# Patient Record
Sex: FEMALE | Race: WHITE | NOT HISPANIC OR LATINO | Employment: OTHER | URBAN - METROPOLITAN AREA
[De-identification: names, ages, dates, MRNs, and addresses within clinical notes are randomized per-mention and may not be internally consistent; named-entity substitution may affect disease eponyms.]

---

## 2017-02-16 ENCOUNTER — OFFICE VISIT (OUTPATIENT)
Dept: OPTOMETRY | Facility: CLINIC | Age: 72
End: 2017-02-16
Payer: COMMERCIAL

## 2017-02-16 DIAGNOSIS — H02.833 DERMATOCHALASIS OF EYELIDS OF BOTH EYES: ICD-10-CM

## 2017-02-16 DIAGNOSIS — H02.836 DERMATOCHALASIS OF EYELIDS OF BOTH EYES: ICD-10-CM

## 2017-02-16 DIAGNOSIS — H52.4 PRESBYOPIA: ICD-10-CM

## 2017-02-16 DIAGNOSIS — H57.819 BROW PTOSIS: ICD-10-CM

## 2017-02-16 DIAGNOSIS — H52.203 ASTIGMATISM OF BOTH EYES: ICD-10-CM

## 2017-02-16 DIAGNOSIS — H40.053 OCULAR HYPERTENSION, BILATERAL: Primary | ICD-10-CM

## 2017-02-16 PROCEDURE — 92015 DETERMINE REFRACTIVE STATE: CPT | Performed by: OPTOMETRIST

## 2017-02-16 PROCEDURE — 92014 COMPRE OPH EXAM EST PT 1/>: CPT | Performed by: OPTOMETRIST

## 2017-02-16 ASSESSMENT — VISUAL ACUITY
OD_SC: 20/70-1
OS_SC+: +1
CORRECTION_TYPE: GLASSES
OS_SC: 20/50-2
OD_SC+: -1
OS_CC: 20/25
OD_CC: 20/30-2
OS_SC: 20/30
METHOD: SNELLEN - LINEAR
OD_SC: 20/30

## 2017-02-16 ASSESSMENT — REFRACTION_WEARINGRX
OS_ADD: +2.00
OS_CYLINDER: SPHERE
OD_SPHERE: +0.25
OS_SPHERE: PLANO
OD_AXIS: 150
OD_SPHERE: +1.75
OD_ADD: +2.00
OS_SPHERE: +1.75
OD_CYLINDER: +1.00
SPECS_TYPE: OTC'S

## 2017-02-16 ASSESSMENT — PACHYMETRY
OD_CT(UM): 630
OS_CT(UM): 636

## 2017-02-16 ASSESSMENT — CONF VISUAL FIELD
OS_NORMAL: 1
OD_NORMAL: 1

## 2017-02-16 ASSESSMENT — REFRACTION_MANIFEST
OD_SPHERE: -0.25
OS_SPHERE: +0.25
OS_AXIS: 035
OD_AXIS: 108
OS_SPHERE: +0.25
OD_SPHERE: +0.75
OS_ADD: +2.00
OD_ADD: +2.00
OD_CYLINDER: +1.25
OS_CYLINDER: +0.50
OS_CYLINDER: +1.00
METHOD_AUTOREFRACTION: 1
OS_AXIS: 040
OD_AXIS: 140
OD_CYLINDER: +1.25

## 2017-02-16 ASSESSMENT — SLIT LAMP EXAM - LIDS
COMMENTS: 1+ DERMATOCHALASIS: UPPER LID
COMMENTS: 1+ DERMATOCHALASIS: UPPER LID

## 2017-02-16 ASSESSMENT — KERATOMETRY
OD_AXISANGLE2_DEGREES: 176
OD_K1POWER_DIOPTERS: 46.50
OD_K2POWER_DIOPTERS: 48.50
OS_AXISANGLE2_DEGREES: 151
OS_K2POWER_DIOPTERS: 47.00
OS_K1POWER_DIOPTERS: 46.00

## 2017-02-16 ASSESSMENT — TONOMETRY
OS_IOP_MMHG: 20
OD_IOP_MMHG: 21
IOP_METHOD: APPLANATION

## 2017-02-16 ASSESSMENT — CUP TO DISC RATIO
OD_RATIO: 0.5
OS_RATIO: 0.5

## 2017-02-16 ASSESSMENT — EXTERNAL EXAM - LEFT EYE: OS_EXAM: BROW PTOSIS

## 2017-02-16 ASSESSMENT — EXTERNAL EXAM - RIGHT EYE: OD_EXAM: BROW PTOSIS

## 2017-02-16 NOTE — MR AVS SNAPSHOT
"              After Visit Summary   2/16/2017    Mami Dumont    MRN: [id number]           Patient Information     Date Of Birth          1945        Visit Information        Provider Department      2/16/2017 10:30 AM Mireya Lira OD [facility]        Care Instructions    Patient was advised of today's exam findings.  Reading glasses advised  Referral to [facility]      Refer to [facility] [place] for glaucoma vision fields and OCT  Their office will call you to schedule appointment.   Please call [phone] if you have not heard from them within 1 week.    Keyur Preciado and Angel  [facility]  [address] [place] [address] [phone]    Mireya Lira O.D.  [facility]   [address] [place], [place] [id number] [phone]              Follow-ups after your visit        Who to contact     If you have questions or need follow up information about today's clinic visit or your schedule please contact [facility] directly at [phone].  Normal or non-critical lab and imaging results will be communicated to you by MyChart, letter or phone within 4 business days after the clinic has received the results. If you do not hear from us within 7 days, please contact the clinic through ZEFRhart or phone. If you have a critical or abnormal lab result, we will notify you by phone as soon as possible.  Submit refill requests through WineShop or call your pharmacy and they will forward the refill request to us. Please allow 3 business days for your refill to be completed.          Additional Information About Your Visit        MyChart Information     WineShop lets you send messages to your doctor, view your test results, renew your prescriptions, schedule appointments and more. To sign up, go to [email or web address] . Click on \"Log in\" on the left side of the screen, which will " "take you to the Welcome page. Then click on \"Sign up Now\" on the right side of the page.     You will be asked to enter the access code listed below, as well as some personal information. Please follow the directions to create your username and password.     Your access code is: [email or web address]  Expires: 2017 11:57 AM     Your access code will  in 90 days. If you need help or a new code, please call your [place] clinic or [phone].        Care EveryWhere ID     This is your Care EveryWhere ID. This could be used by other organizations to access your [place] medical records  [id number]         Blood Pressure from Last 3 Encounters:   16 186/74   09/21/15 138/78   01/30/15 165/85    Weight from Last 3 Encounters:   16 51.7 kg (114 lb)   09/21/15 51.3 kg (113 lb)   01/30/15 53.5 kg (118 lb)              Today, you had the following     No orders found for display       Primary Care Provider Office Phone #    [facility] [phone]       No address on file        Thank you!     [facility] you [facility]. Our goal is always to provide you with excellent care. Hearing back from our patients is one way we can continue to improve our services. Please take a few minutes to complete the written survey that you may receive in the mail after your visit with us. Thank you!             Your Updated Medication List - Protect others around you: Learn how to safely use, store and throw away your medicines at www.disposemymeds.org.          This list is accurate as of: 17 12:02 PM.  Always use your most recent med list.                   Brand Name Dispense Instructions for use    CENTRUM PO      Take  by mouth. Takes 1 daily       CLARITIN-D 12 HOUR 5-120 MG per 12 hr tablet   Generic drug:  loratadine-pseudoePHEDrine      Take 1 tablet by mouth as needed Taking less than prior due to hypertension.       FLONASE 50 MCG/ACT spray   Generic drug:  " fluticasone     16 g    Spray 2 sprays in nostril daily Brand necessary. Patient gets headache with generic.       hydrochlorothiazide 25 MG tablet    HYDRODIURIL    90 tablet    Take 1 tablet (25 mg) by mouth daily       METHOCARBAMOL PO      As needed       naproxen 500 MG tablet    NAPROSYN     Take 500 mg by mouth 2 times daily (with meals).       propranolol HCl 60 MG Tabs      Take 1 tablet by mouth 20 mg as needed for tremors       SINUS RINSE BOTTLE KIT NA      Spray 1 Bottle in nostril as needed.       STATIN NOT PRESCRIBED (INTENTIONAL)     0 each    1 each continuous prn. Statin not prescribed intentionally due to Refusal by patient       VITAMIN E-200 PO      Take  by mouth. Takes 1 daily

## 2017-02-16 NOTE — PROGRESS NOTES
Chief Complaint   Patient presents with     COMPREHENSIVE EYE EXAM         Last Eye Exam: 2/9/2015  Dilated Previously: Yes    What are you currently using to see?  Readers, uses them to read and at times on the computer, especially if she's working with numbers        Distance Vision Acuity: Satisfied with vision, no changes noticed.     Near Vision Acuity: Satisfied with vision while reading  with readers and Satisfied with vision while using computer with readers and unaided at times  Eye Comfort: good, did mention that she has allergies that sometimes bother her   Do you use eye drops? : Yes: once in awhile for itching when her allergies are bothering her   Occupation or Hobbies: Works for [employer]          Medical, surgical and family histories reviewed and updated 2/16/2017.     Mother - glaucoma   Had appointment with Dr Alicea but did not have surgery- wants another referral    OBJECTIVE: See Ophthalmology exam    ASSESSMENT:    ICD-10-CM    1. 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 Astigmatism of both eyes H52.203 EYE EXAM (SIMPLE-NONBILLABLE)     REFRACTIVE STATUS   3. Presbyopia H52.4 EYE EXAM (SIMPLE-NONBILLABLE)     REFRACTIVE STATUS   4. Brow ptosis, ou L90.8 EYE EXAM (SIMPLE-NONBILLABLE)     REFRACTIVE STATUS     OPHTHALMOLOGY ADULT REFERRAL   5. Dermatochalasis of eyelids of both eyes H02.833 EYE EXAM (SIMPLE-NONBILLABLE)    H02.836 REFRACTIVE STATUS      PLAN:     Patient Instructions   Patient was advised of today's exam findings.  Reading glasses advised  Referral to [facility]      Refer to [facility] [place] for glaucoma vision fields and OCT  Their office will call you to schedule appointment.   Please call [phone] if you have not heard from them within 1 week.    Keyur Preciado and Angel  [facility]  [address]  [place] [address]   [phone]    Mireya Lira O.D.  [address] [id number] [phone]

## 2017-02-16 NOTE — PATIENT INSTRUCTIONS
Patient was advised of today's exam findings.  Reading glasses advised  Referral to [facility]      Refer to [facility] at [place] for glaucoma vision fields and OCT  Their office will call you to schedule appointment.   Please call [phone] if you have not heard from them within 1 week.    Keyur Preciado and Angel  [facility] [place] [facility]  [address] [place] [id number] [phone]    Mireya Lira O.D.  [place] [address] [place], [place] [id number] [phone]

## 2017-03-14 ENCOUNTER — OFFICE VISIT (OUTPATIENT)
Dept: OPHTHALMOLOGY | Facility: CLINIC | Age: 72
End: 2017-03-14
Payer: COMMERCIAL

## 2017-03-14 DIAGNOSIS — H40.003 GLAUCOMA SUSPECT OF BOTH EYES: Primary | ICD-10-CM

## 2017-03-14 PROCEDURE — 92133 CPTRZD OPH DX IMG PST SGM ON: CPT | Performed by: OPHTHALMOLOGY

## 2017-03-14 PROCEDURE — 92002 INTRM OPH EXAM NEW PATIENT: CPT | Performed by: OPHTHALMOLOGY

## 2017-03-14 PROCEDURE — 92083 EXTENDED VISUAL FIELD XM: CPT | Performed by: OPHTHALMOLOGY

## 2017-03-14 ASSESSMENT — EXTERNAL EXAM - RIGHT EYE: OD_EXAM: BROW PTOSIS

## 2017-03-14 ASSESSMENT — PACHYMETRY
OS_CT(UM): 636
OD_CT(UM): 630

## 2017-03-14 ASSESSMENT — TONOMETRY
OS_IOP_MMHG: 18
OD_IOP_MMHG: 20
IOP_METHOD: APPLANATION

## 2017-03-14 ASSESSMENT — VISUAL ACUITY
METHOD: SNELLEN - LINEAR
OS_SC+: +2
CORRECTION_TYPE: GLASSES
OS_SC: 20/30
OD_SC: 20/25

## 2017-03-14 ASSESSMENT — EXTERNAL EXAM - LEFT EYE: OS_EXAM: BROW PTOSIS

## 2017-03-14 ASSESSMENT — SLIT LAMP EXAM - LIDS
COMMENTS: 1+ DERMATOCHALASIS: UPPER LID
COMMENTS: 1+ DERMATOCHALASIS: UPPER LID

## 2017-03-14 NOTE — PROGRESS NOTES
Current Eye Medications:  None     Subjective:  Glaucoma Evaluation per Pankaj.  No visual changes noted.     Objective:  See Ophthalmology Exam.       Assessment:  Baseline glaucoma OCT and Egan Visual Field in patient who is a glaucoma suspect.  Thick corneas on pachymetry.      Plan: See Dr. Alicea as planned  Return visit one year for a complete exam with Dr. Lira  Call in November 2017 for an appointment in March 2018 for Pressure check, Visual Field, glaucoma OCT     Dr. Preciado [phone]

## 2017-03-14 NOTE — PATIENT INSTRUCTIONS
See Dr. Alicea as planned  Return visit one year for a complete exam with Dr. Lira  Call in November 2017 for an appointment in March 2018 for Pressure check, Visual Field, glaucoma OCT     Dr. Preciado [phone]

## 2017-03-14 NOTE — MR AVS SNAPSHOT
"              After Visit Summary   3/14/2017    Mami Dumont    MRN: [id number]           Patient Information     Date Of Birth          1945        Visit Information        Provider Department      3/14/2017 12:45 PM Alvin Preciado MD [facility]        Today's Diagnoses     Glaucoma suspect of both eyes    -  1      Care Instructions    See Dr. Alicea as planned  Return visit one year for a complete exam with Dr. Lira  Call in November 2017 for an appointment in March 2018 for Pressure check, Visual Field, glaucoma OCT     Dr. Preciado [phone]        Follow-ups after your visit        Who to contact     If you have questions or need follow up information about today's clinic visit or your schedule please contact [facility] directly at [phone].  Normal or non-critical lab and imaging results will be communicated to you by MyChart, letter or phone within 4 business days after the clinic has received the results. If you do not hear from us within 7 days, please contact the clinic through MyChart or phone. If you have a critical or abnormal lab result, we will notify you by phone as soon as possible.  Submit refill requests through Fanear or call your pharmacy and they will forward the refill request to us. Please allow 3 business days for your refill to be completed.          Additional Information About Your Visit        MyChart Information     Fanear lets you send messages to your doctor, view your test results, renew your prescriptions, schedule appointments and more. To sign up, go to [email or web address] . Click on \"Log in\" on the left side of the screen, which will take you to the Welcome page. Then click on \"Sign up Now\" on the right side of the page.     You will be asked to enter the access code listed below, as well as some personal information. Please follow the directions to create your username and password.     Your access code is: " [email or web address]  Expires: 2017 12:57 PM     Your access code will  in 90 days. If you need help or a new code, please call your [place] clinic or [phone].        Care EveryWhere ID     This is your Care EveryWhere ID. This could be used by other organizations to access your [place] medical records  [id number]         Blood Pressure from Last 3 Encounters:   16 186/74   09/21/15 138/78   01/30/15 165/85    Weight from Last 3 Encounters:   16 51.7 kg (114 lb)   09/21/15 51.3 kg (113 lb)   01/30/15 53.5 kg (118 lb)              We Performed the Following     HC COMPUTERIZED OPHTHALMIC IMAGING OPTIC NERVE     VISUAL FIELDS EXAM (EXTENDED)        Primary Care Provider Office Phone #    [facility] [phone]       No address on file        Thank you!     Thank you for choosing [facility]  for your care. Our goal is always to provide you with excellent care. Hearing back from our patients is one way we can continue to improve our services. Please take a few minutes to complete the written survey that you may receive in the mail after your visit with us. Thank you!             Your Updated Medication List - Protect others around you: Learn how to safely use, store and throw away your medicines at www.disposemymeds.org.          This list is accurate as of: 3/14/17  1:46 PM.  Always use your most recent med list.                   Brand Name Dispense Instructions for use    CENTRUM PO      Take  by mouth. Takes 1 daily       CLARITIN-D 12 HOUR 5-120 MG per 12 hr tablet   Generic drug:  loratadine-pseudoePHEDrine      Take 1 tablet by mouth as needed Taking less than prior due to hypertension.       FLONASE 50 MCG/ACT spray   Generic drug:  fluticasone     16 g    Spray 2 sprays in nostril daily Brand necessary. Patient gets headache with generic.       hydrochlorothiazide 25 MG tablet    HYDRODIURIL    90 tablet    Take 1 tablet (25 mg) by mouth daily        METHOCARBAMOL PO      As needed       naproxen 500 MG tablet    NAPROSYN     Take 500 mg by mouth 2 times daily (with meals).       propranolol HCl 60 MG Tabs      Take 1 tablet by mouth 20 mg as needed for tremors       SINUS RINSE BOTTLE KIT NA      Spray 1 Bottle in nostril as needed.       STATIN NOT PRESCRIBED (INTENTIONAL)     0 each    1 each continuous prn. Statin not prescribed intentionally due to Refusal by patient       VITAMIN E-200 PO      Take  by mouth. Takes 1 daily

## 2017-05-05 ENCOUNTER — OFFICE VISIT (OUTPATIENT)
Dept: INTERNAL MEDICINE | Facility: CLINIC | Age: 72
End: 2017-05-05
Payer: COMMERCIAL

## 2017-05-05 VITALS
HEIGHT: 60 IN | WEIGHT: 115 LBS | SYSTOLIC BLOOD PRESSURE: 122 MMHG | BODY MASS INDEX: 22.58 KG/M2 | TEMPERATURE: 98.4 F | DIASTOLIC BLOOD PRESSURE: 73 MMHG | OXYGEN SATURATION: 100 % | HEART RATE: 71 BPM

## 2017-05-05 DIAGNOSIS — Z11.59 NEED FOR HEPATITIS C SCREENING TEST: ICD-10-CM

## 2017-05-05 DIAGNOSIS — H57.819 BROW PTOSIS: ICD-10-CM

## 2017-05-05 DIAGNOSIS — Z12.31 VISIT FOR SCREENING MAMMOGRAM: ICD-10-CM

## 2017-05-05 DIAGNOSIS — Z23 NEED FOR PROPHYLACTIC VACCINATION AGAINST STREPTOCOCCUS PNEUMONIAE (PNEUMOCOCCUS): ICD-10-CM

## 2017-05-05 DIAGNOSIS — I10 HYPERTENSION GOAL BP (BLOOD PRESSURE) < 140/90: ICD-10-CM

## 2017-05-05 DIAGNOSIS — Z01.818 PREOP GENERAL PHYSICAL EXAM: Primary | ICD-10-CM

## 2017-05-05 LAB
ANION GAP SERPL CALCULATED.3IONS-SCNC: 10 MMOL/L (ref 3–14)
BUN SERPL-MCNC: 16 MG/DL (ref 7–30)
CALCIUM SERPL-MCNC: 8.9 MG/DL (ref 8.5–10.1)
CHLORIDE SERPL-SCNC: 99 MMOL/L (ref 94–109)
CO2 SERPL-SCNC: 27 MMOL/L (ref 20–32)
CREAT SERPL-MCNC: 0.87 MG/DL (ref 0.52–1.04)
GFR SERPL CREATININE-BSD FRML MDRD: 64 ML/MIN/1.7M2
GLUCOSE SERPL-MCNC: 77 MG/DL (ref 70–99)
HGB BLD-MCNC: 13.5 G/DL (ref 11.7–15.7)
POTASSIUM SERPL-SCNC: 3.4 MMOL/L (ref 3.4–5.3)
SODIUM SERPL-SCNC: 136 MMOL/L (ref 133–144)

## 2017-05-05 PROCEDURE — 86803 HEPATITIS C AB TEST: CPT | Performed by: INTERNAL MEDICINE

## 2017-05-05 PROCEDURE — 80048 BASIC METABOLIC PNL TOTAL CA: CPT | Performed by: INTERNAL MEDICINE

## 2017-05-05 PROCEDURE — 93000 ELECTROCARDIOGRAM COMPLETE: CPT | Performed by: INTERNAL MEDICINE

## 2017-05-05 PROCEDURE — 85018 HEMOGLOBIN: CPT | Performed by: INTERNAL MEDICINE

## 2017-05-05 PROCEDURE — 99215 OFFICE O/P EST HI 40 MIN: CPT | Mod: 25 | Performed by: INTERNAL MEDICINE

## 2017-05-05 PROCEDURE — 36415 COLL VENOUS BLD VENIPUNCTURE: CPT | Performed by: INTERNAL MEDICINE

## 2017-05-05 NOTE — PATIENT INSTRUCTIONS
Do not take Naproxen starting 2 days prior to surgery.   Stop vitamin E two weeks before surgery and you may restart it two weeks after surgery.  Please do not take hydrochlorothiazide on the day of the procedure or surgery. You may restart it/them the next day as soon as you are eating and drinking well, and  as long as your surgeon approves you restarting this medication(s).  Please stop all other vitamins and supplements on the day of the procedure or surgery and you may restart them them when you are discharged (unless advised otherwise at the time of the discharge).  Please call our clinic if you have any questions.    Before Your Surgery      Call your surgeon if there is any change in your health. This includes signs of a cold or flu (such as a sore throat, runny nose, cough, rash or fever).    Do not smoke, drink alcohol or take over the counter medicine (unless your surgeon or primary care doctor tells you to) for the 24 hours before and after surgery.    If you take prescribed drugs: Follow your doctor s orders about which medicines to take and which to stop until after surgery.    Eating and drinking prior to surgery: follow the instructions from your surgeon    Take a shower or bath the night before surgery. Use the soap your surgeon gave you to gently clean your skin. If you do not have soap from your surgeon, use your regular soap. Do not shave or scrub the surgery site.  Wear clean pajamas and have clean sheets on your bed.

## 2017-05-05 NOTE — MR AVS SNAPSHOT
After Visit Summary   5/5/2017    Mami Dumont    MRN: [id number]           Patient Information     Date Of Birth          1945        Visit Information        Provider Department      5/5/2017 10:30 AM Polina Curiel MD [facility]        Today's Diagnoses     Preop general physical exam    -  1    Visit for screening mammogram        Need for hepatitis C screening test        Need for prophylactic vaccination against Streptococcus pneumoniae (pneumococcus)          Care Instructions    Do not take Naproxen starting 2 days prior to surgery.   Stop vitamin E two weeks before surgery and you may restart it two weeks after surgery.  Please do not take hydrochlorothiazide on the day of the procedure or surgery. You may restart it/them the next day as soon as you are eating and drinking well, and  as long as your surgeon approves you restarting this medication(s).  Please stop all other vitamins and supplements on the day of the procedure or surgery and you may restart them them when you are discharged (unless advised otherwise at the time of the discharge).  Please call our clinic if you have any questions.    Before Your Surgery      Call your surgeon if there is any change in your health. This includes signs of a cold or flu (such as a sore throat, runny nose, cough, rash or fever).    Do not smoke, drink alcohol or take over the counter medicine (unless your surgeon or primary care doctor tells you to) for the 24 hours before and after surgery.    If you take prescribed drugs: Follow your doctor s orders about which medicines to take and which to stop until after surgery.    Eating and drinking prior to surgery: follow the instructions from your surgeon    Take a shower or bath the night before surgery. Use the soap your surgeon gave you to gently clean your skin. If you do not have soap from your surgeon, use your regular soap. Do not shave or scrub the surgery  "site.  Wear clean pajamas and have clean sheets on your bed.         Follow-ups after your visit        Future tests that were ordered for you today     Open Future Orders        Priority Expected Expires Ordered    MA SCREENING DIGITAL BILAT - Future  (s+30) Routine  2018            Who to contact     If you have questions or need follow up information about today's clinic visit or your schedule please contact [facility] directly at [phone].  Normal or non-critical lab and imaging results will be communicated to you by Zooskhart, letter or phone within 4 business days after the clinic has received the results. If you do not hear from us within 7 days, please contact the clinic through Zooskhart or phone. If you have a critical or abnormal lab result, we will notify you by phone as soon as possible.  Submit refill requests through [employer] or call your pharmacy and they will forward the refill request to us. Please allow 3 business days for your refill to be completed.          Additional Information About Your Visit        EGG Energy Information     [employer] lets you send messages to your doctor, view your test results, renew your prescriptions, schedule appointments and more. To sign up, go to [email or web address] . Click on \"Log in\" on the left side of the screen, which will take you to the Welcome page. Then click on \"Sign up Now\" on the right side of the page.     You will be asked to enter the access code listed below, as well as some personal information. Please follow the directions to create your username and password.     Your access code is: [email or web address]  Expires: 2017 12:57 PM     Your access code will  in 90 days. If you need help or a new code, please call your [facility] or [phone].        Care EveryWhere ID     This is your Care EveryWhere ID. This could be used by other organizations to access your [place] medical records  [id number]        Your " Vitals Were     Pulse Temperature Height Pulse Oximetry BMI (Body Mass Index)       71 98.4  F (36.9  C) (Oral) 5' (1.524 m) 100% 22.46 kg/m2        Blood Pressure from Last 3 Encounters:   05/05/17 122/73   07/03/16 186/74   09/21/15 138/78    Weight from Last 3 Encounters:   05/05/17 115 lb (52.2 kg)   07/03/16 114 lb (51.7 kg)   09/21/15 113 lb (51.3 kg)              We Performed the Following     Basic metabolic panel  (Ca, Cl, CO2, Creat, Gluc, K, Na, BUN)     EKG 12-lead complete w/read - Clinics     Hemoglobin     Hepatitis C Screen Reflex to HCV RNA Quant and Genotype        Primary Care Provider Office Phone #    [facility] Cort[facility] [phone]       No address on file        Thank you!     Thank you for [facility]  [facility]. Our goal is always to provide you with excellent care. Hearing back from our patients is one way we can continue to improve our services. Please take a few minutes to complete the written survey that you may receive in the mail after your visit with us. Thank you!             Your Updated Medication List - Protect others around you: Learn how to safely use, store and throw away your medicines at www.disposemymeds.org.          This list is accurate as of: 5/5/17 11:42 AM.  Always use your most recent med list.                   Brand Name Dispense Instructions for use    CENTRUM PO      Take  by mouth. Takes 1 daily       CLARITIN-D 12 HOUR 5-120 MG per 12 hr tablet   Generic drug:  loratadine-pseudoePHEDrine      Take 1 tablet by mouth as needed Taking less than prior due to hypertension.       FLONASE 50 MCG/ACT spray   Generic drug:  fluticasone     16 g    Spray 2 sprays in nostril daily Brand necessary. Patient gets headache with generic.       hydrochlorothiazide 25 MG tablet    HYDRODIURIL    90 tablet    Take 1 tablet (25 mg) by mouth daily       METHOCARBAMOL PO      As needed       naproxen 500 MG tablet    NAPROSYN     Take 500 mg by mouth 2  times daily (with meals).       propranolol HCl 60 MG Tabs      Take 1 tablet by mouth 20 mg as needed for tremors       SINUS RINSE BOTTLE KIT NA      Spray 1 Bottle in nostril as needed.       STATIN NOT PRESCRIBED (INTENTIONAL)     0 each    1 each continuous prn. Statin not prescribed intentionally due to Refusal by patient       VITAMIN E-200 PO      Take  by mouth. Takes 1 daily

## 2017-05-05 NOTE — PROGRESS NOTES
[facility]  [address] [facility] [address]  [phone]  Dept: [phone]    PRE-OP EVALUATION:  Today's date: 2017    Mami Dumont (: 1945) presents for pre-operative evaluation assessment as requested by Dr. Alicea.  She requires evaluation and anesthesia risk assessment prior to undergoing surgery/procedure for treatment of drooping brows.  Proposed procedure: browlift (for better vision).    Date of Surgery/ Procedure: 2017  Time of Surgery/ Procedure: 9am  Hospital/Surgical Facility: [facility]   Fax number for surgical facility: [phone]  Primary Physician: Yoel Roth  Type of Anesthesia Anticipated: General    Patient has a Health Care Directive or Living Will:  NO    1. NO - Do you have a history of heart attack, stroke, stent, bypass or surgery on an artery in the head, neck, heart or legs?  2. NO - Do you ever have any pain or discomfort in your chest?  3. NO - Do you have a history of  Heart Failure?  4. NO - Are you troubled by shortness of breath when: walking on the level, up a slight hill or at night?  5. NO - Do you currently have a cold, bronchitis or other respiratory infection?  6. NO - Do you have a cough, shortness of breath or wheezing?  7. NO - Do you sometimes get pains in the calves of your legs when you walk?  8. NO - Do you or anyone in your family have previous history of blood clots?  9. NO - Do you or does anyone in your family have a serious bleeding problem such as prolonged bleeding following surgeries or cuts?  10. NO - Have you ever had problems with anemia or been told to take iron pills?  11. NO - Have you had any abnormal blood loss such as black, tarry or bloody stools, or abnormal vaginal bleeding?  12. NO - Have you ever had a blood transfusion?  13. NO - Have you or any of your relatives ever had problems with anesthesia?  14. NO - Do you have sleep apnea, excessive snoring or  daytime drowsiness?  15. NO - Do you have any prosthetic heart valves?  16. NO - Do you have prosthetic joints?  17. NO - Is there any chance that you may be pregnant?      HPI:                                                      Brief HPI related to upcoming procedure: see above    Hypertension:  ASSESSMENT: Longstanding, well-controlled, on hydrochlorothiazide and other medications as noted.  PLAN: see Patient Instructions below.   See problem list for active medical problems.  Problems all longstanding and stable, except as noted/documented.  See ROS for pertinent symptoms related to these conditions.                                                                                                  .    MEDICAL HISTORY:                                                      Patient Active Problem List    Diagnosis Date Noted     Glaucoma suspect of both eyes 03/14/2017     Priority: Medium     Essential tremor 01/23/2014     Priority: Medium     Hypertension goal BP (blood pressure) < 140/90 09/10/2013     Priority: Medium     Tubular adenoma of colon      Priority: Medium     Dermatochalasis, ou 05/23/2013     Priority: Medium     Brow ptosis, ou 05/23/2013     Priority: Medium     Hyperlipidemia LDL goal <160 04/24/2013     Priority: Medium     Heme positive stool 04/24/2013     Priority: Medium     CARDIOVASCULAR SCREENING; LDL GOAL LESS THAN 160 04/05/2013     Priority: Medium     Advanced directives, counseling/discussion 04/03/2013     Priority: Medium     Advance Care Planning:   Form given Venecia Lu MA               Diagnostic skin and sensitization tests      Priority: Medium     1/99 skin tests: pos. for cat/DM/M/RW per Dr. Shah.        Allergic rhinitis due to animal dander      Priority: Medium     Seasonal allergic rhinitis      Priority: Medium     House dust mite allergy      Priority: Medium     Rhinitis, allergic to other allergen      Priority: Medium     IMO update changed this record. Please  review for accuracy       Seasonal allergic conjunctivitis      Priority: Medium      Past Medical History:   Diagnosis Date     Allergic rhinitis due to animal dander 1/99 skin tests: pos. for cat/DM/M/RW per Dr. Shah.           Diagnostic skin and sensitization tests     1/99 skin tests: pos. for cat/DM/M/RW per Dr. Shah.      House dust mite allergy      Hypertension goal BP (blood pressure) < 140/90 9/10/2013     Open angle with borderline findings, low risk 8/29/2013     Rhinitis, allergic to other allergen      Seasonal allergic conjunctivitis      Seasonal allergic rhinitis     hx of elevated BP on Sudafed, Pt told to discontinue Sudafed products by Dr. Taylor on 3/1/10 visit.      Tubular adenoma of colon 5/2013    colonoscopy due 5/2018     Past Surgical History:   Procedure Laterality Date     BLEPHAROPLASTY, BROW LIFT BILATERAL, COMBINED  5/2/13    with snip punctoplasty,Sadowsky     ENDOSCOPIC POLYPECTOMY NASAL  1960    nasal polyp removal     Current Outpatient Prescriptions   Medication Sig Dispense Refill     hydrochlorothiazide (HYDRODIURIL) 25 MG tablet Take 1 tablet (25 mg) by mouth daily 90 tablet 3     FLONASE 50 MCG/ACT nasal spray Spray 2 sprays in nostril daily Brand necessary. Patient gets headache with generic. 16 g 3     propranolol HCl 60 MG TABS Take 1 tablet by mouth 20 mg as needed for tremors       [DISCONTINUED] amLODIPine (NORVASC) 5 MG tablet Take 1 tablet (5 mg) by mouth daily 30 tablet 1     loratadine-pseudoePHEDrine (CLARITIN-D 12 HOUR) 5-120 MG per tablet Take 1 tablet by mouth as needed Taking less than prior due to hypertension.       METHOCARBAMOL PO As needed       STATIN NOT PRESCRIBED, INTENTIONAL, 1 each continuous prn. Statin not prescribed intentionally due to Refusal by patient 0 each 0     Multiple Vitamins-Minerals (CENTRUM PO) Take  by mouth. Takes 1 daily       VITAMIN E-200 PO Take  by mouth. Takes 1 daily       naproxen (NAPROSYN) 500 MG tablet Take 500 mg by  mouth 2 times daily (with meals).       Hypertonic Nasal Wash (SINUS RINSE BOTTLE KIT NA) Spray 1 Bottle in nostril as needed.       OTC products: None, except as noted above    Allergies   Allergen Reactions     Ace Inhibitors      Head tremor     Penicillins       Latex Allergy: NO    Social History   Substance Use Topics     Smoking status: Never Smoker     Smokeless tobacco: Never Used      Comment: Lives in smoke free household     Alcohol use No     History   Drug Use No       REVIEW OF SYSTEMS:                                                    Constitutional, neuro, ENT, endocrine, pulmonary, cardiac, gastrointestinal, genitourinary, musculoskeletal, integument and psychiatric systems are negative, except as otherwise noted.    EXAM:                                                    /73  Pulse 71  Temp 98.4  F (36.9  C) (Oral)  Ht 5' (1.524 m)  Wt 115 lb (52.2 kg)  SpO2 100%  BMI 22.46 kg/m2      GENERAL APPEARANCE: healthy, alert and no distress     EYES: EOMI,- PERRL     HENT: ear canals and TM's normal and nose and mouth without ulcers or lesions     NECK: no adenopathy, no asymmetry, masses, or scars and thyroid normal to palpation     RESP: lungs clear to auscultation - no rales, rhonchi or wheezes     CV: regular rates and rhythm, normal S1 S2, no S3 or S4 and no murmur, click or rub -     ABDOMEN:  soft, nontender, no HSM or masses and bowel sounds normal     MS: extremities normal- no gross deformities noted, no evidence of inflammation in joints, FROM in all extremities.     SKIN: no suspicious lesions or rashes     NEURO: Normal strength and tone, sensory exam grossly normal, mentation intact and speech normal     PSYCH: mentation appears normal. and affect normal/bright     LYMPHATICS: No cervical or supraclavicular nodes      DIAGNOSTICS:                                                    EKG: appears normal, NSR, normal axis, normal intervals, no acute ST/T changes c/w ischemia, no  LVH by voltage criteria    Results for orders placed or performed in visit on 05/05/17   Hemoglobin   Result Value Ref Range    Hemoglobin 13.5 11.7 - 15.7 g/dL   Basic metabolic panel  (Ca, Cl, CO2, Creat, Gluc, K, Na, BUN)   Result Value Ref Range    Sodium 136 133 - 144 mmol/L    Potassium 3.4 3.4 - 5.3 mmol/L    Chloride 99 94 - 109 mmol/L    Carbon Dioxide 27 20 - 32 mmol/L    Anion Gap 10 3 - 14 mmol/L    Glucose 77 70 - 99 mg/dL    Urea Nitrogen 16 7 - 30 mg/dL    Creatinine 0.87 0.52 - 1.04 mg/dL    GFR Estimate 64 >60 mL/min/1.7m2    GFR Estimate If Black 77 >60 mL/min/1.7m2    Calcium 8.9 8.5 - 10.1 mg/dL        Recent Labs   Lab Test  10/07/15   0900  11/21/14   1154  11/21/13   1312   HGB   --    --   13.1   PLT   --    --   306   NA  136  138  137   POTASSIUM  3.7  3.7  4.8   CR  0.92  0.87  0.80        IMPRESSION:                                                    Reason for surgery/procedure: see above  Diagnosis/reason for consult: see above    The proposed surgical procedure is considered INTERMEDIATE risk.    REVISED CARDIAC RISK INDEX  The patient has the following serious cardiovascular risks for perioperative complications such as (MI, PE, VFib and 3  AV Block):  No serious cardiac risks  INTERPRETATION: 1 risks: Class II (low risk - 0.9% complication rate): age    The patient has the following additional risks for perioperative complications:  No identified additional risks      ICD-10-CM    1. Preop general physical exam Z01.818 EKG 12-lead complete w/read - Clinics     Hemoglobin     Basic metabolic panel  (Ca, Cl, CO2, Creat, Gluc, K, Na, BUN)   2. Brow ptosis, ou L90.8    3. Hypertension goal BP (blood pressure) < 140/90 I10    4. Visit for screening mammogram Z12.31 MA SCREENING DIGITAL BILAT - Future  (s+30)   5. Need for hepatitis C screening test Z11.59 Hepatitis C Screen Reflex to HCV RNA Quant and Genotype   6. Need for prophylactic vaccination against Streptococcus pneumoniae  (pneumococcus) Z23        RECOMMENDATIONS:                                                        Patient Instructions:  Do not take Naproxen starting 2 days prior to surgery.   Stop vitamin E two weeks before surgery and you may restart it two weeks after surgery.  Please do not take hydrochlorothiazide on the day of the procedure or surgery. You may restart it/them the next day as soon as you are eating and drinking well, and  as long as your surgeon approves you restarting this medication(s).  Please stop all other vitamins and supplements on the day of the procedure or surgery and you may restart them them when you are discharged (unless advised otherwise at the time of the discharge).  Please call our clinic if you have any questions.        Signed Electronically by: Polina Curiel MD    Copy of this evaluation report is provided to requesting physician.    [facility] Preop Guidelines

## 2017-05-05 NOTE — LETTER
[facility]  [address] [facility] [address] [phone]        May 10, 2017    Mami Dumont  [address] [facility] [address]            Dear Mami,    Your kidney function and blood electrolytes are within normal limits.   Your hemoglobin is within normal limits - you do not have anemia currently.  You do not have Hepatitis C.  The test for Hepatitis C test was done, as you will recall, because we are screening people born within a certain time period for this infection, according to new medical guidelines.    Please call our clinic at [phone] if you have any questions.      Polina Curiel MD    Results for orders placed or performed in visit on 05/05/17   Hepatitis C Screen Reflex to HCV RNA Quant and Genotype   Result Value Ref Range    Hepatitis C Antibody  NR     Nonreactive   Assay performance characteristics have not been established for newborns,   infants, and children     Hemoglobin   Result Value Ref Range    Hemoglobin 13.5 11.7 - 15.7 g/dL   Basic metabolic panel  (Ca, Cl, CO2, Creat, Gluc, K, Na, BUN)   Result Value Ref Range    Sodium 136 133 - 144 mmol/L    Potassium 3.4 3.4 - 5.3 mmol/L    Chloride 99 94 - 109 mmol/L    Carbon Dioxide 27 20 - 32 mmol/L    Anion Gap 10 3 - 14 mmol/L    Glucose 77 70 - 99 mg/dL    Urea Nitrogen 16 7 - 30 mg/dL    Creatinine 0.87 0.52 - 1.04 mg/dL    GFR Estimate 64 >60 mL/min/1.7m2    GFR Estimate If Black 77 >60 mL/min/1.7m2    Calcium 8.9 8.5 - 10.1 mg/dL

## 2017-05-08 LAB — HCV AB SERPL QL IA: NORMAL

## 2017-05-10 NOTE — PROGRESS NOTES
Please send letter informing the patient and attach results:    Dear Mami,     Your kidney function and blood electrolytes are within normal limits.   Your hemoglobin is within normal limits - you do not have anemia currently.  You do not have Hepatitis C.  The test for Hepatitis C test was done, as you will recall, because we are screening people born within a certain time period for this infection, according to new medical guidelines.    Please call our clinic at [phone] if you have any questions.      Polina Curiel MD

## 2017-05-16 NOTE — H&P (VIEW-ONLY)
[facility]  [address] [facility] [address]  [phone]  Dept: [phone]    PRE-OP EVALUATION:  Today's date: 2017    Mami Dumont (: 1945) presents for pre-operative evaluation assessment as requested by Dr. Alicea.  She requires evaluation and anesthesia risk assessment prior to undergoing surgery/procedure for treatment of drooping brows.  Proposed procedure: browlift (for better vision).    Date of Surgery/ Procedure: 2017  Time of Surgery/ Procedure: 9am  Hospital/Surgical Facility: [facility]   Fax number for surgical facility: [phone]  Primary Physician: Yoel Roth  Type of Anesthesia Anticipated: General    Patient has a Health Care Directive or Living Will:  NO    1. NO - Do you have a history of heart attack, stroke, stent, bypass or surgery on an artery in the head, neck, heart or legs?  2. NO - Do you ever have any pain or discomfort in your chest?  3. NO - Do you have a history of  Heart Failure?  4. NO - Are you troubled by shortness of breath when: walking on the level, up a slight hill or at night?  5. NO - Do you currently have a cold, bronchitis or other respiratory infection?  6. NO - Do you have a cough, shortness of breath or wheezing?  7. NO - Do you sometimes get pains in the calves of your legs when you walk?  8. NO - Do you or anyone in your family have previous history of blood clots?  9. NO - Do you or does anyone in your family have a serious bleeding problem such as prolonged bleeding following surgeries or cuts?  10. NO - Have you ever had problems with anemia or been told to take iron pills?  11. NO - Have you had any abnormal blood loss such as black, tarry or bloody stools, or abnormal vaginal bleeding?  12. NO - Have you ever had a blood transfusion?  13. NO - Have you or any of your relatives ever had problems with anesthesia?  14. NO - Do you have sleep apnea, excessive snoring or  daytime drowsiness?  15. NO - Do you have any prosthetic heart valves?  16. NO - Do you have prosthetic joints?  17. NO - Is there any chance that you may be pregnant?      HPI:                                                      Brief HPI related to upcoming procedure: see above    Hypertension:  ASSESSMENT: Longstanding, well-controlled, on hydrochlorothiazide and other medications as noted.  PLAN: see Patient Instructions below.   See problem list for active medical problems.  Problems all longstanding and stable, except as noted/documented.  See ROS for pertinent symptoms related to these conditions.                                                                                                  .    MEDICAL HISTORY:                                                      Patient Active Problem List    Diagnosis Date Noted     Glaucoma suspect of both eyes 03/14/2017     Priority: Medium     Essential tremor 01/23/2014     Priority: Medium     Hypertension goal BP (blood pressure) < 140/90 09/10/2013     Priority: Medium     Tubular adenoma of colon      Priority: Medium     Dermatochalasis, ou 05/23/2013     Priority: Medium     Brow ptosis, ou 05/23/2013     Priority: Medium     Hyperlipidemia LDL goal <160 04/24/2013     Priority: Medium     Heme positive stool 04/24/2013     Priority: Medium     CARDIOVASCULAR SCREENING; LDL GOAL LESS THAN 160 04/05/2013     Priority: Medium     Advanced directives, counseling/discussion 04/03/2013     Priority: Medium     Advance Care Planning:   Form given Venecia Lu MA               Diagnostic skin and sensitization tests      Priority: Medium     1/99 skin tests: pos. for cat/DM/M/RW per Dr. Shah.        Allergic rhinitis due to animal dander      Priority: Medium     Seasonal allergic rhinitis      Priority: Medium     House dust mite allergy      Priority: Medium     Rhinitis, allergic to other allergen      Priority: Medium     IMO update changed this record. Please  review for accuracy       Seasonal allergic conjunctivitis      Priority: Medium      Past Medical History:   Diagnosis Date     Allergic rhinitis due to animal dander 1/99 skin tests: pos. for cat/DM/M/RW per Dr. Shah.           Diagnostic skin and sensitization tests     1/99 skin tests: pos. for cat/DM/M/RW per Dr. Shah.      House dust mite allergy      Hypertension goal BP (blood pressure) < 140/90 9/10/2013     Open angle with borderline findings, low risk 8/29/2013     Rhinitis, allergic to other allergen      Seasonal allergic conjunctivitis      Seasonal allergic rhinitis     hx of elevated BP on Sudafed, Pt told to discontinue Sudafed products by Dr. Taylor on 3/1/10 visit.      Tubular adenoma of colon 5/2013    colonoscopy due 5/2018     Past Surgical History:   Procedure Laterality Date     BLEPHAROPLASTY, BROW LIFT BILATERAL, COMBINED  5/2/13    with snip punctoplasty,Sadowsky     ENDOSCOPIC POLYPECTOMY NASAL  1960    nasal polyp removal     Current Outpatient Prescriptions   Medication Sig Dispense Refill     hydrochlorothiazide (HYDRODIURIL) 25 MG tablet Take 1 tablet (25 mg) by mouth daily 90 tablet 3     FLONASE 50 MCG/ACT nasal spray Spray 2 sprays in nostril daily Brand necessary. Patient gets headache with generic. 16 g 3     propranolol HCl 60 MG TABS Take 1 tablet by mouth 20 mg as needed for tremors       [DISCONTINUED] amLODIPine (NORVASC) 5 MG tablet Take 1 tablet (5 mg) by mouth daily 30 tablet 1     loratadine-pseudoePHEDrine (CLARITIN-D 12 HOUR) 5-120 MG per tablet Take 1 tablet by mouth as needed Taking less than prior due to hypertension.       METHOCARBAMOL PO As needed       STATIN NOT PRESCRIBED, INTENTIONAL, 1 each continuous prn. Statin not prescribed intentionally due to Refusal by patient 0 each 0     Multiple Vitamins-Minerals (CENTRUM PO) Take  by mouth. Takes 1 daily       VITAMIN E-200 PO Take  by mouth. Takes 1 daily       naproxen (NAPROSYN) 500 MG tablet Take 500 mg by  mouth 2 times daily (with meals).       Hypertonic Nasal Wash (SINUS RINSE BOTTLE KIT NA) Spray 1 Bottle in nostril as needed.       OTC products: None, except as noted above    Allergies   Allergen Reactions     Ace Inhibitors      Head tremor     Penicillins       Latex Allergy: NO    Social History   Substance Use Topics     Smoking status: Never Smoker     Smokeless tobacco: Never Used      Comment: Lives in smoke free household     Alcohol use No     History   Drug Use No       REVIEW OF SYSTEMS:                                                    Constitutional, neuro, ENT, endocrine, pulmonary, cardiac, gastrointestinal, genitourinary, musculoskeletal, integument and psychiatric systems are negative, except as otherwise noted.    EXAM:                                                    /73  Pulse 71  Temp 98.4  F (36.9  C) (Oral)  Ht 5' (1.524 m)  Wt 115 lb (52.2 kg)  SpO2 100%  BMI 22.46 kg/m2      GENERAL APPEARANCE: healthy, alert and no distress     EYES: EOMI,- PERRL     HENT: ear canals and TM's normal and nose and mouth without ulcers or lesions     NECK: no adenopathy, no asymmetry, masses, or scars and thyroid normal to palpation     RESP: lungs clear to auscultation - no rales, rhonchi or wheezes     CV: regular rates and rhythm, normal S1 S2, no S3 or S4 and no murmur, click or rub -     ABDOMEN:  soft, nontender, no HSM or masses and bowel sounds normal     MS: extremities normal- no gross deformities noted, no evidence of inflammation in joints, FROM in all extremities.     SKIN: no suspicious lesions or rashes     NEURO: Normal strength and tone, sensory exam grossly normal, mentation intact and speech normal     PSYCH: mentation appears normal. and affect normal/bright     LYMPHATICS: No cervical or supraclavicular nodes      DIAGNOSTICS:                                                    EKG: appears normal, NSR, normal axis, normal intervals, no acute ST/T changes c/w ischemia, no  LVH by voltage criteria    Results for orders placed or performed in visit on 05/05/17   Hemoglobin   Result Value Ref Range    Hemoglobin 13.5 11.7 - 15.7 g/dL   Basic metabolic panel  (Ca, Cl, CO2, Creat, Gluc, K, Na, BUN)   Result Value Ref Range    Sodium 136 133 - 144 mmol/L    Potassium 3.4 3.4 - 5.3 mmol/L    Chloride 99 94 - 109 mmol/L    Carbon Dioxide 27 20 - 32 mmol/L    Anion Gap 10 3 - 14 mmol/L    Glucose 77 70 - 99 mg/dL    Urea Nitrogen 16 7 - 30 mg/dL    Creatinine 0.87 0.52 - 1.04 mg/dL    GFR Estimate 64 >60 mL/min/1.7m2    GFR Estimate If Black 77 >60 mL/min/1.7m2    Calcium 8.9 8.5 - 10.1 mg/dL        Recent Labs   Lab Test  10/07/15   0900  11/21/14   1154  11/21/13   1312   HGB   --    --   13.1   PLT   --    --   306   NA  136  138  137   POTASSIUM  3.7  3.7  4.8   CR  0.92  0.87  0.80        IMPRESSION:                                                    Reason for surgery/procedure: see above  Diagnosis/reason for consult: see above    The proposed surgical procedure is considered INTERMEDIATE risk.    REVISED CARDIAC RISK INDEX  The patient has the following serious cardiovascular risks for perioperative complications such as (MI, PE, VFib and 3  AV Block):  No serious cardiac risks  INTERPRETATION: 1 risks: Class II (low risk - 0.9% complication rate): age    The patient has the following additional risks for perioperative complications:  No identified additional risks      ICD-10-CM    1. Preop general physical exam Z01.818 EKG 12-lead complete w/read - Clinics     Hemoglobin     Basic metabolic panel  (Ca, Cl, CO2, Creat, Gluc, K, Na, BUN)   2. Brow ptosis, ou L90.8    3. Hypertension goal BP (blood pressure) < 140/90 I10    4. Visit for screening mammogram Z12.31 MA SCREENING DIGITAL BILAT - Future  (s+30)   5. Need for hepatitis C screening test Z11.59 Hepatitis C Screen Reflex to HCV RNA Quant and Genotype   6. Need for prophylactic vaccination against Streptococcus pneumoniae  (pneumococcus) Z23        RECOMMENDATIONS:                                                        Patient Instructions:  Do not take Naproxen starting 2 days prior to surgery.   Stop vitamin E two weeks before surgery and you may restart it two weeks after surgery.  Please do not take hydrochlorothiazide on the day of the procedure or surgery. You may restart it/them the next day as soon as you are eating and drinking well, and  as long as your surgeon approves you restarting this medication(s).  Please stop all other vitamins and supplements on the day of the procedure or surgery and you may restart them them when you are discharged (unless advised otherwise at the time of the discharge).  Please call our clinic if you have any questions.        Signed Electronically by: Polina Curiel MD    Copy of this evaluation report is provided to requesting physician.    [facility] Preop Guidelines

## 2017-05-19 ENCOUNTER — SURGERY (OUTPATIENT)
Age: 72
End: 2017-05-19

## 2017-05-19 ENCOUNTER — ANESTHESIA (OUTPATIENT)
Dept: SURGERY | Facility: CLINIC | Age: 72
End: 2017-05-19
Payer: COMMERCIAL

## 2017-05-19 ENCOUNTER — ANESTHESIA EVENT (OUTPATIENT)
Dept: SURGERY | Facility: CLINIC | Age: 72
End: 2017-05-19
Payer: COMMERCIAL

## 2017-05-19 ENCOUNTER — HOSPITAL ENCOUNTER (OUTPATIENT)
Facility: CLINIC | Age: 72
Discharge: HOME | End: 2017-05-19
Attending: OPHTHALMOLOGY | Admitting: OPHTHALMOLOGY
Payer: COMMERCIAL

## 2017-05-19 VITALS
DIASTOLIC BLOOD PRESSURE: 63 MMHG | OXYGEN SATURATION: 100 % | HEIGHT: 60 IN | WEIGHT: 114.2 LBS | SYSTOLIC BLOOD PRESSURE: 129 MMHG | TEMPERATURE: 96.6 F | RESPIRATION RATE: 16 BRPM | BODY MASS INDEX: 22.42 KG/M2

## 2017-05-19 DIAGNOSIS — Z98.890 POSTOPERATIVE STATE: Primary | ICD-10-CM

## 2017-05-19 PROCEDURE — 71000012 ZZH RECOVERY PHASE 1 LEVEL 1 FIRST HR: Performed by: OPHTHALMOLOGY

## 2017-05-19 PROCEDURE — 71000027 ZZH RECOVERY PHASE 2 EACH 15 MINS: Performed by: OPHTHALMOLOGY

## 2017-05-19 PROCEDURE — 37000008 ZZH ANESTHESIA TECHNICAL FEE, 1ST 30 MIN: Performed by: OPHTHALMOLOGY

## 2017-05-19 PROCEDURE — 36000058 ZZH SURGERY LEVEL 3 EA 15 ADDTL MIN: Performed by: OPHTHALMOLOGY

## 2017-05-19 PROCEDURE — 37000009 ZZH ANESTHESIA TECHNICAL FEE, EACH ADDTL 15 MIN: Performed by: OPHTHALMOLOGY

## 2017-05-19 PROCEDURE — 25000128 H RX IP 250 OP 636: Performed by: NURSE ANESTHETIST, CERTIFIED REGISTERED

## 2017-05-19 PROCEDURE — 25000132 ZZH RX MED GY IP 250 OP 250 PS 637: Performed by: OPHTHALMOLOGY

## 2017-05-19 PROCEDURE — 36000056 ZZH SURGERY LEVEL 3 1ST 30 MIN: Performed by: OPHTHALMOLOGY

## 2017-05-19 PROCEDURE — S0020 INJECTION, BUPIVICAINE HYDRO: HCPCS | Performed by: OPHTHALMOLOGY

## 2017-05-19 PROCEDURE — 25000125 ZZHC RX 250: Performed by: NURSE ANESTHETIST, CERTIFIED REGISTERED

## 2017-05-19 PROCEDURE — 40000170 ZZH STATISTIC PRE-PROCEDURE ASSESSMENT II: Performed by: OPHTHALMOLOGY

## 2017-05-19 PROCEDURE — 25000125 ZZHC RX 250: Performed by: OPHTHALMOLOGY

## 2017-05-19 PROCEDURE — 27210794 ZZH OR GENERAL SUPPLY STERILE: Performed by: OPHTHALMOLOGY

## 2017-05-19 RX ORDER — LIDOCAINE HYDROCHLORIDE AND EPINEPHRINE BITARTRATE 20; .01 MG/ML; MG/ML
INJECTION, SOLUTION SUBCUTANEOUS
Status: DISCONTINUED
Start: 2017-05-19 | End: 2017-05-19 | Stop reason: HOSPADM

## 2017-05-19 RX ORDER — FENTANYL CITRATE 50 UG/ML
25-50 INJECTION, SOLUTION INTRAMUSCULAR; INTRAVENOUS EVERY 5 MIN PRN
Status: DISCONTINUED | OUTPATIENT
Start: 2017-05-19 | End: 2017-05-19 | Stop reason: HOSPADM

## 2017-05-19 RX ORDER — NALOXONE HYDROCHLORIDE 0.4 MG/ML
.1-.4 INJECTION, SOLUTION INTRAMUSCULAR; INTRAVENOUS; SUBCUTANEOUS
Status: DISCONTINUED | OUTPATIENT
Start: 2017-05-19 | End: 2017-05-19 | Stop reason: HOSPADM

## 2017-05-19 RX ORDER — TETRACAINE HYDROCHLORIDE 5 MG/ML
SOLUTION OPHTHALMIC PRN
Status: DISCONTINUED | OUTPATIENT
Start: 2017-05-19 | End: 2017-05-19 | Stop reason: HOSPADM

## 2017-05-19 RX ORDER — PROPOFOL 10 MG/ML
INJECTION, EMULSION INTRAVENOUS PRN
Status: DISCONTINUED | OUTPATIENT
Start: 2017-05-19 | End: 2017-05-19

## 2017-05-19 RX ORDER — LIDOCAINE HYDROCHLORIDE 20 MG/ML
INJECTION, SOLUTION INFILTRATION; PERINEURAL PRN
Status: DISCONTINUED | OUTPATIENT
Start: 2017-05-19 | End: 2017-05-19

## 2017-05-19 RX ORDER — ONDANSETRON 2 MG/ML
4 INJECTION INTRAMUSCULAR; INTRAVENOUS EVERY 30 MIN PRN
Status: DISCONTINUED | OUTPATIENT
Start: 2017-05-19 | End: 2017-05-19 | Stop reason: HOSPADM

## 2017-05-19 RX ORDER — SODIUM CHLORIDE, SODIUM LACTATE, POTASSIUM CHLORIDE, CALCIUM CHLORIDE 600; 310; 30; 20 MG/100ML; MG/100ML; MG/100ML; MG/100ML
INJECTION, SOLUTION INTRAVENOUS CONTINUOUS PRN
Status: DISCONTINUED | OUTPATIENT
Start: 2017-05-19 | End: 2017-05-19

## 2017-05-19 RX ORDER — TETRACAINE HYDROCHLORIDE 5 MG/ML
SOLUTION OPHTHALMIC
Status: DISCONTINUED
Start: 2017-05-19 | End: 2017-05-19 | Stop reason: HOSPADM

## 2017-05-19 RX ORDER — HYDROCODONE BITARTRATE AND ACETAMINOPHEN 5; 325 MG/1; MG/1
1 TABLET ORAL EVERY 6 HOURS PRN
Qty: 10 TABLET | Refills: 0 | Status: SHIPPED | OUTPATIENT
Start: 2017-05-19 | End: 2017-07-07

## 2017-05-19 RX ORDER — FENTANYL CITRATE 50 UG/ML
25-50 INJECTION, SOLUTION INTRAMUSCULAR; INTRAVENOUS
Status: DISCONTINUED | OUTPATIENT
Start: 2017-05-19 | End: 2017-05-19 | Stop reason: HOSPADM

## 2017-05-19 RX ORDER — PHYSOSTIGMINE SALICYLATE 1 MG/ML
1.2 INJECTION INTRAVENOUS
Status: DISCONTINUED | OUTPATIENT
Start: 2017-05-19 | End: 2017-05-19 | Stop reason: HOSPADM

## 2017-05-19 RX ORDER — BUPIVACAINE HYDROCHLORIDE 5 MG/ML
INJECTION, SOLUTION EPIDURAL; INTRACAUDAL
Status: DISCONTINUED
Start: 2017-05-19 | End: 2017-05-19 | Stop reason: HOSPADM

## 2017-05-19 RX ORDER — HYDROCODONE BITARTRATE AND ACETAMINOPHEN 5; 325 MG/1; MG/1
1 TABLET ORAL
Status: DISCONTINUED | OUTPATIENT
Start: 2017-05-19 | End: 2017-05-19 | Stop reason: HOSPADM

## 2017-05-19 RX ORDER — ONDANSETRON 4 MG/1
4 TABLET, ORALLY DISINTEGRATING ORAL EVERY 30 MIN PRN
Status: DISCONTINUED | OUTPATIENT
Start: 2017-05-19 | End: 2017-05-19 | Stop reason: HOSPADM

## 2017-05-19 RX ORDER — HYDROMORPHONE HYDROCHLORIDE 1 MG/ML
.3-.5 INJECTION, SOLUTION INTRAMUSCULAR; INTRAVENOUS; SUBCUTANEOUS EVERY 10 MIN PRN
Status: DISCONTINUED | OUTPATIENT
Start: 2017-05-19 | End: 2017-05-19 | Stop reason: HOSPADM

## 2017-05-19 RX ORDER — ERYTHROMYCIN 5 MG/G
OINTMENT OPHTHALMIC
Status: DISCONTINUED
Start: 2017-05-19 | End: 2017-05-19 | Stop reason: HOSPADM

## 2017-05-19 RX ORDER — SODIUM CHLORIDE, SODIUM LACTATE, POTASSIUM CHLORIDE, CALCIUM CHLORIDE 600; 310; 30; 20 MG/100ML; MG/100ML; MG/100ML; MG/100ML
INJECTION, SOLUTION INTRAVENOUS CONTINUOUS
Status: DISCONTINUED | OUTPATIENT
Start: 2017-05-19 | End: 2017-05-19 | Stop reason: HOSPADM

## 2017-05-19 RX ORDER — ERYTHROMYCIN 5 MG/G
OINTMENT OPHTHALMIC PRN
Status: DISCONTINUED | OUTPATIENT
Start: 2017-05-19 | End: 2017-05-19 | Stop reason: HOSPADM

## 2017-05-19 RX ORDER — MEPERIDINE HYDROCHLORIDE 25 MG/ML
12.5 INJECTION INTRAMUSCULAR; INTRAVENOUS; SUBCUTANEOUS
Status: DISCONTINUED | OUTPATIENT
Start: 2017-05-19 | End: 2017-05-19 | Stop reason: HOSPADM

## 2017-05-19 RX ORDER — ONDANSETRON 2 MG/ML
INJECTION INTRAMUSCULAR; INTRAVENOUS PRN
Status: DISCONTINUED | OUTPATIENT
Start: 2017-05-19 | End: 2017-05-19

## 2017-05-19 RX ORDER — EPHEDRINE SULFATE 50 MG/ML
INJECTION, SOLUTION INTRAMUSCULAR; INTRAVENOUS; SUBCUTANEOUS PRN
Status: DISCONTINUED | OUTPATIENT
Start: 2017-05-19 | End: 2017-05-19

## 2017-05-19 RX ORDER — ERYTHROMYCIN 5 MG/G
1 OINTMENT OPHTHALMIC 3 TIMES DAILY
Qty: 3.5 G | Refills: 0 | Status: SHIPPED | OUTPATIENT
Start: 2017-05-19 | End: 2017-05-26

## 2017-05-19 RX ADMIN — DEXMEDETOMIDINE HYDROCHLORIDE 8 MCG: 100 INJECTION, SOLUTION INTRAVENOUS at 10:09

## 2017-05-19 RX ADMIN — LIDOCAINE HYDROCHLORIDE 60 MG: 20 INJECTION, SOLUTION INFILTRATION; PERINEURAL at 10:00

## 2017-05-19 RX ADMIN — Medication 5 MG: at 10:23

## 2017-05-19 RX ADMIN — ONDANSETRON 4 MG: 2 INJECTION INTRAMUSCULAR; INTRAVENOUS at 10:12

## 2017-05-19 RX ADMIN — ERYTHROMYCIN 2 G: 5 OINTMENT OPHTHALMIC at 10:17

## 2017-05-19 RX ADMIN — SODIUM CHLORIDE, POTASSIUM CHLORIDE, SODIUM LACTATE AND CALCIUM CHLORIDE: 600; 310; 30; 20 INJECTION, SOLUTION INTRAVENOUS at 09:58

## 2017-05-19 RX ADMIN — PROPOFOL 15 MG: 10 INJECTION, EMULSION INTRAVENOUS at 10:09

## 2017-05-19 RX ADMIN — PROPOFOL 20 MG: 10 INJECTION, EMULSION INTRAVENOUS at 10:00

## 2017-05-19 RX ADMIN — DEXMEDETOMIDINE HYDROCHLORIDE 12 MCG: 100 INJECTION, SOLUTION INTRAVENOUS at 09:58

## 2017-05-19 RX ADMIN — MIDAZOLAM HYDROCHLORIDE 1 MG: 1 INJECTION, SOLUTION INTRAMUSCULAR; INTRAVENOUS at 10:00

## 2017-05-19 RX ADMIN — Medication 6 ML: at 10:05

## 2017-05-19 RX ADMIN — Medication 5 MG: at 10:21

## 2017-05-19 RX ADMIN — TETRACAINE HYDROCHLORIDE 1 DROP: 5 SOLUTION OPHTHALMIC at 09:59

## 2017-05-19 NOTE — ANESTHESIA POSTPROCEDURE EVALUATION
Patient: Mami Dumont    Procedure(s):  BILATERAL UPPER LID BLEPHAROPLASTY WITH BROW PTOSIS  - Wound Class: I-Clean    Diagnosis:DERMATOCHALSIS AND BROW PTOSIS  Diagnosis Additional Information: No value filed.    Anesthesia Type:  MAC    Note:  Anesthesia Post Evaluation    Patient location during evaluation: PACU  Patient participation: Able to fully participate in evaluation  Level of consciousness: awake  Pain management: adequate  Airway patency: patent  Cardiovascular status: acceptable  Respiratory status: acceptable  Hydration status: acceptable  PONV: none     Anesthetic complications: None          Last vitals:  Vitals:    05/19/17 1045 05/19/17 1100 05/19/17 1115   BP: 118/70 130/61 129/63   Resp: 18 16 16   Temp:      SpO2: 100% 99% 100%         Electronically Signed By: Power Payan MD  May 19, 2017  11:33 AM

## 2017-05-19 NOTE — IP AVS SNAPSHOT
[facility] Same Day Surgery    [address]    [place] [address]    Phone:  [phone]    Fax:  [phone]                                       After Visit Summary   5/19/2017    Mami Dumont    MRN: [id number]           After Visit Summary Signature Page     I have received my discharge instructions, and my questions have been answered. I have discussed any challenges I see with this plan with the nurse or doctor.    ..........................................................................................................................................  Patient/Patient Representative Signature      ..........................................................................................................................................  Patient Representative Print Name and Relationship to Patient    ..................................................               ................................................  Date                                            Time    ..........................................................................................................................................  Reviewed by Signature/Title    ...................................................              ..............................................  Date                                                            Time

## 2017-05-19 NOTE — IP AVS SNAPSHOT
MRN:[id number]                      After Visit Summary   5/19/2017    Mami Dumont    MRN: [id number]           Thank you!     Thank you for choosing [facility] for your care. Our goal is always to provide you with excellent care. Hearing back from our patients is one way we can continue to improve our services. Please take a few minutes to complete the written survey that you may receive in the mail after you visit with us. Thank you!        Patient Information     Date Of Birth          1945        About your hospital stay     You were admitted on:  May 19, 2017 [facility]    You were discharged on:  May 19, 2017       Who to Call     For medical emergencies, please call 911.  For non-urgent questions about your medical care, please call your primary care provider or clinic, [phone]  For questions related to your surgery, please call your surgery clinic        Attending Provider     Provider Specialty    Faisal Alicea MD Ophthalmology       Primary Care Provider Office Phone #    [facility] [phone]       No address on file        After Care Instructions     Discharge Medication Instructions       Do NOT take aspirin or medications containing NSAIDS for 72 hours after procedure.            Ice to affected area       Apply cold pack for 15 minutes on, 15 minutes off, for 48 hours while awake.                  Your next 10 appointments already scheduled     Jun 12, 2017 10:00 AM CDT   MA SCREENING DIGITAL BILATERAL with [id number]   [facility]    [address] [place] [id number] [phone]           Do not use any powder, lotion or deodorant under your arms or on your breast. If you do, we will ask you to remove it before your exam.  Wear comfortable, two-piece clothing.  If you have any allergies, tell your care team.  Bring any previous mammograms from  other facilities or have them mailed to the breast center.              Further instructions from your care team       Post-operative Instructions  Ophthalmic Plastic and Reconstructive Surgery  Faisal Alicea M.D.  Talmage Broadbent MD, PhD     All instructions apply to the operated eye(s) or eyelid(s).  Wound care and personal care  ? If a patch or bandage has been placed, please leave this in place until seen by your physician. Ensure that the bandage does not get wet when you take a shower.  ? Apply ice compresses for 15 minutes on 15 minutes off while awake for 2 days, then switch to warm water compresses 4 times a day until seen by your physician. For warm packs you can place a cup of dry uncooked rice in a clean cotton sock. Then place sock in microwave 30 seconds to one minute. Next place the warm sock into a plastic bag and wrap the bag with clean warm wet washcloth and place over operated eye.    ? You may shower or wash your hair the day after surgery. Do not bathe or go  swimming for 1 week to prevent contamination of your wounds.  ? Do not apply make-up to the eyes or eyelids for 2 weeks after surgery.  ? Expect some swelling, bruising, black eye (even into the lower eyelids and cheeks). Also expect serum caking, crusting and discharge from the eye and/or incisions. A small amount of surface bleeding is normal for the first 48 hours.  ? Your eye(s) and eyelid(s) may be painful and tender. This is normal after surgery.  Use the pain medication as prescribed. If your pain does not improve despite the  medication, contact the office.    Contact information and follow-up  ? Return to the Eye Clinic for a follow-up appointment with your physician as  scheduled. If no appointment has been scheduled, call [phone] for an  appointment with Dr. Alicea within 1 to 2 weeks from your date of surgery.  ? For severe pain, bleeding, or loss of vision, call the Eye Clinic at [phone].    An on call  person can be reached after hours for concerns. The on call doctor should not call in medication refill requests after hours or on weekends, so please plan accordingly. An effort has been made to provide adequate pain medications following every surgery, and refills will not be provided in most instances. Narcotic pain medications cannot be called in.      Activity restrictions and driving  ? Avoid heavy lifting, bending, exercise or strenuous activity for 1 week after surgery. You may resume other activities and return to work as tolerated.  ? You may not resume driving until have you stopped using narcotic pain medications  (such as Vicodin, Percocet, Tylenol #3).    Medications  ? Restart all your regular home medications and eye drops. If you take Plavix or  Aspirin on a regular basis, wait for 72 hours after your surgery before restarting these in order to decrease the risk of bleeding complications.  ? Avoid aspirin and aspirin-like medications (Motrin, Aleve, Ibuprofen, Mariana-  Langston etc) for 72 hours to reduce the risk of bleeding. You may take Tylenol  (acetaminophen) for pain.  ? In addition to your home medications, take the following post-operative medications as prescribed by your physician.     ? Apply antibiotic ointment to all sutures three times a day, and into the operated eye(s) at night.  ? Take pain pills at prescribed frequency as needed for pain.    ? WARNING: All the prescription pain medications listed above contain Tylenol  (acetaminophen). You must not take more than 4,000 mg of acetaminophen per  24-hour period. This is equivalent to 6 tablets of Darvocet, 8 tablets of Vicodin, or  12 tablets of Percocet or Tylenol #3. If you take other over-the-counter medications  containing acetaminophen, you must take the amount of acetaminophen into  account and reduce the number of prescribed pain pills accordingly.  ? The prescribed medications may make you drowsy. You must not drive a  car,  operate heavy machinery or drink alcohol while taking them.  ? The prescribed pain medications may cause constipation and nausea. Take them  with some food to prevent a stomach upset. If you continue to experience nausea,  call your physician.      Same Day Surgery Discharge Instructions for  Sedation and General Anesthesia       It's not unusual to feel dizzy, light-headed or faint for up to 24 hours after surgery or while taking pain medication.  If you have these symptoms: sit for a few minutes before standing and have someone assist you when you get up to walk or use the bathroom.      You should rest and relax for the next 24 hours. We recommend you make arrangements to have an adult stay with you for at least 24 hours after your discharge.  Avoid hazardous and strenuous activity.      DO NOT DRIVE any vehicle or operate mechanical equipment for 24 hours following the end of your surgery.  Even though you may feel normal, your reactions may be affected by the medication you have received.      Do not drink alcoholic beverages for 24 hours following surgery.       Slowly progress to your regular diet as you feel able. It's not unusual to feel nauseated and/or vomit after receiving anesthesia.  If you develop these symptoms, drink clear liquids (apple juice, ginger ale, broth, 7-up, etc. ) until you feel better.  If your nausea and vomiting persists for 24 hours, please notify your surgeon.        All narcotic pain medications, along with inactivity and anesthesia, can cause constipation. Drinking plenty of liquids and increasing fiber intake will help.      For any questions of a medical nature, call your surgeon.      Do not make important decisions for 24 hours.      If you had general anesthesia, you may have a sore throat for a couple of days related to the breathing tube used during surgery.  You may use Cepacol lozenges to help with this discomfort.  If it worsens or if you develop a fever, contact  "your surgeon.       If you feel your pain is not well managed with the pain medications prescribed by your surgeon, please contact your surgeon's office to let them know so they can address your concerns.                 Pending Results     No orders found from 2017 to 2017.            Admission Information     Date & Time Provider Department Dept. Phone    2017 Faisal Alicea MD [facility] Same Day [facility] [phone]      Your Vitals Were     Blood Pressure Temperature Respirations Height Weight Pulse Oximetry    130/61 96.6  F (35.9  C) (Temporal) 16 1.524 m (5') 51.8 kg (114 lb 3.2 oz) 99%    BMI (Body Mass Index)                   22.3 kg/m2           MyChart Information     [facility] [employer] lets you send messages to your doctor, view your test results, renew your prescriptions, schedule appointments and more. To sign up, go to [email or web address] . Click on \"Log in\" on the left side of the screen, which will take you to the Welcome page. Then click on \"Sign up Now\" on the right side of the page.     You will be asked to enter the access code listed below, as well as some personal information. Please follow the directions to create your username and password.     Your access code is: [email or web address]  Expires: 2017 11:00 AM     Your access code will  in 90 days. If you need help or a new code, please call your [facility] clinic or [phone].        Care EveryWhere ID     This is your Care EveryWhere ID. This could be used by other organizations to access your [facility] medical records  [id number]           Review of your medicines      START taking        Dose / Directions    erythromycin ophthalmic ointment   Commonly known as:  ROMYCIN   Used for:  Postoperative state        Dose:  1 Application   Apply 1 Application to eye 3 times daily for 7 days Apply small amount to incision sites, as directed per physician instructions.   Quantity:  3.5 g   Refills:  0       " HYDROcodone-acetaminophen 5-325 MG per tablet   Commonly known as:  NORCO   Used for:  Postoperative state        Dose:  1 tablet   Take 1 tablet by mouth every 6 hours as needed for pain Maximum of 4000 mg of acetaminophen in 24 hours.   Quantity:  10 tablet   Refills:  0         CONTINUE these medicines which have NOT CHANGED        Dose / Directions    CENTRUM PO        Take  by mouth. Takes 1 daily   Refills:  0       CLARITIN-D 12 HOUR 5-120 MG per 12 hr tablet   Generic drug:  loratadine-pseudoePHEDrine        Dose:  1 tablet   Take 1 tablet by mouth as needed Taking less than prior due to hypertension.   Refills:  0       FLONASE 50 MCG/ACT spray   Used for:  Seasonal allergic rhinitis   Generic drug:  fluticasone        Dose:  2 spray   Spray 2 sprays in nostril daily Brand necessary. Patient gets headache with generic.   Quantity:  16 g   Refills:  3       hydrochlorothiazide 25 MG tablet   Commonly known as:  HYDRODIURIL   Used for:  Benign essential hypertension        Dose:  25 mg   Take 1 tablet (25 mg) by mouth daily   Quantity:  90 tablet   Refills:  3       METHOCARBAMOL PO        Dose:  250 mg   Take 250 mg by mouth as needed As needed   Refills:  0       naproxen 500 MG tablet   Commonly known as:  NAPROSYN        Dose:  500 mg   Take 500 mg by mouth 2 times daily (with meals).   Refills:  0       propranolol HCl 60 MG Tabs        Dose:  1 tablet   Take 1 tablet by mouth 20 mg as needed for tremors   Refills:  0       SINUS RINSE BOTTLE KIT NA   Used for:  Seasonal allergic rhinitis        Dose:  1 Bottle   Spray 1 Bottle in nostril as needed.   Refills:  0       STATIN NOT PRESCRIBED (INTENTIONAL)   Used for:  Hyperlipidemia LDL goal <160        Dose:  1 each   1 each continuous prn. Statin not prescribed intentionally due to Refusal by patient   Quantity:  0 each   Refills:  0       TYLENOL PO        Dose:  500 mg   Take 500 mg by mouth every 8 hours as needed for mild pain or fever   Refills:  0        VITAMIN E-200 PO        Take  by mouth. Takes 1 daily   Refills:  0            Where to get your medicines      These medications were sent to [facility] [place], [place] - [address]  [address] [place], [place] [address]     Phone:  [phone]     erythromycin ophthalmic ointment         Some of these will need a paper prescription and others can be bought over the counter. Ask your nurse if you have questions.     Bring a paper prescription for each of these medications     HYDROcodone-acetaminophen 5-325 MG per tablet                Protect others around you: Learn how to safely use, store and throw away your medicines at www.disposemymeds.org.             Medication List: This is a list of all your medications and when to take them. Check marks below indicate your daily home schedule. Keep this list as a reference.      Medications           Morning Afternoon Evening Bedtime As Needed    CENTRUM PO   Take  by mouth. Takes 1 daily                                CLARITIN-D 12 HOUR 5-120 MG per 12 hr tablet   Take 1 tablet by mouth as needed Taking less than prior due to hypertension.   Generic drug:  loratadine-pseudoePHEDrine                                erythromycin ophthalmic ointment   Commonly known as:  ROMYCIN   Apply 1 Application to eye 3 times daily for 7 days Apply small amount to incision sites, as directed per physician instructions.   Last time this was given:  2 g on 5/19/2017 10:17 AM                                FLONASE 50 MCG/ACT spray   Spray 2 sprays in nostril daily Brand necessary. Patient gets headache with generic.   Generic drug:  fluticasone                                hydrochlorothiazide 25 MG tablet   Commonly known as:  HYDRODIURIL   Take 1 tablet (25 mg) by mouth daily                                HYDROcodone-acetaminophen 5-325 MG per tablet   Commonly known as:  NORCO   Take 1 tablet by mouth every 6 hours as needed for pain Maximum of 4000  mg of acetaminophen in 24 hours.                                METHOCARBAMOL PO   Take 250 mg by mouth as needed As needed                                naproxen 500 MG tablet   Commonly known as:  NAPROSYN   Take 500 mg by mouth 2 times daily (with meals).                                propranolol HCl 60 MG Tabs   Take 1 tablet by mouth 20 mg as needed for tremors                                SINUS RINSE BOTTLE KIT NA   Spray 1 Bottle in nostril as needed.                                STATIN NOT PRESCRIBED (INTENTIONAL)   1 each continuous prn. Statin not prescribed intentionally due to Refusal by patient                                TYLENOL PO   Take 500 mg by mouth every 8 hours as needed for mild pain or fever                                VITAMIN E-200 PO   Take  by mouth. Takes 1 daily

## 2017-05-19 NOTE — OP NOTE
PREOPERATIVE DIAGNOSES:     1.  Bilateral upper eyelid dermatochalasis.   2.  Bilateral brow ptosis.      POSTOPERATIVE DIAGNOSES:   1.  Bilateral upper eyelid dermatochalasis.   2.  Bilateral brow ptosis.      PROCEDURE PERFORMED:     1.  Bilateral upper blepharoplasty.   2.  Bilateral browpexy brow ptosis repair.     SURGEON: Faisal Alicea MD    ASSISTANTS: Talmage Broadbent, MD and Katherine Linda MD     ANESTHESIA:  Monitored with local infiltration of a 50/50 mixture of 2% lidocaine with epinephrine and 0.5% Marcaine.     EBL:  Less than 5cc.    COMPLICATIONS: None    HISTORY AND INDICATIONS: Mami Dumont presented with drooping of the upper eyelids causing loss of her superior visual field and interfering with her activities of daily living. After the risks, benefits and alternatives to the procedure were explained to the patient and all questions answered, informed consent was obtained.     PROCEDURE:  Mami Dumont  was brought to the operating room and placed supine on the operating table.  IV sedation was given.  In the preoperative area in the seated position the area of the brow to be elevated  was marked at the inferior brow hairs. The upper lid crease and excess upper eyelid skin was marked on each upper eyelid.  The upper eyelids and eyebrows were infiltrated with local anesthetic.  The patient was prepped and draped in the typical sterile ophthalmic fashion.  Attention was directed to theright side.  Skin was incised with a #15 blade following the marked lines.  Skin flap was excised with a high temperature cautery.  Hemostasis was obtained.  Dissection was carried in the suborbicularis plane over the orbital rim superolaterally.  A 4-0 Prolene suture was passed through the marking at the inferior brow hairs.  This suture was then passed through the frontal bone periosteum 1 cm above the orbital rim.  This suture was then passed back through the orbicularis in line with the marking  stitch which was pulled through and tied in a permanent fashion.  The skin was closed with running 6-0 plain gut suture.  Attention was directed to the opposite side where the same procedure was performed.  Erythromycin ophthalmic ointment was applied to the incision sites.  The patient tolerated the procedure well and left the operating room in stable condition.         ISELA ROCKWELL MD

## 2017-05-19 NOTE — ANESTHESIA CARE TRANSFER NOTE
Patient: Mami Dumont    Procedure(s):  BILATERAL UPPER LID BLEPHAROPLASTY WITH BROW PTOSIS  - Wound Class: I-Clean    Diagnosis: DERMATOCHALSIS AND BROW PTOSIS  Diagnosis Additional Information: No value filed.    Anesthesia Type:   MAC     Note:  Airway :Room Air  Patient transferred to:PACU  Comments: VSS      Vitals: (Last set prior to Anesthesia Care Transfer)    CRNA VITALS  5/19/2017 1000 - 5/19/2017 1037      5/19/2017             Resp Rate (set): 10                Electronically Signed By: ARUNA Doll CRNA  May 19, 2017  10:37 AM

## 2017-05-19 NOTE — BRIEF OP NOTE
[facility] Brief Operative Note    Pre-operative diagnosis: DERMATOCHALSIS AND BROW PTOSIS   Post-operative diagnosis Same   Procedure: Procedure(s):  BILATERAL UPPER LID BLEPHAROPLASTY WITH BROW PTOSIS  - Wound Class: I-Clean   Surgeon: Faisal Alicea M.D.    Assistants(s): Talmage Broadbent, M.D. PhD,    Estimated blood loss: Less than 10 mL   Specimens: None   Findings: As expected

## 2017-05-19 NOTE — ANESTHESIA PREPROCEDURE EVALUATION
Anesthesia Evaluation     . Pt has had prior anesthetic.     No history of anesthetic complications          ROS/MED HX    ENT/Pulmonary:       Neurologic:     (+)other neuro (early glaucoma)     Cardiovascular:     (+) hypertension----. : . . . :. .       METS/Exercise Tolerance:     Hematologic:         Musculoskeletal:         GI/Hepatic:         Renal/Genitourinary:         Endo:         Psychiatric:         Infectious Disease:         Malignancy:         Other:                     Physical Exam  Normal systems: cardiovascular and pulmonary    Airway   Mallampati: I  Neck ROM: full    Dental     Cardiovascular       Pulmonary                     Anesthesia Plan      History & Physical Review  History and physical reviewed and following examination; no interval change.    ASA Status:  2 .    NPO Status:  > 6 hours    Plan for MAC          Postoperative Care  Postoperative pain management:  Oral pain medications.      Consents  Anesthetic plan, risks, benefits and alternatives discussed with:  Patient..                          .  DPreop diagnosis: DERMATOCHALSIS AND BROW PTOSIS  Procedure(s):  COMBINED BLEPHAROPLASTY, BROW LIFT BILATERAL  Allergies   Allergen Reactions     Penicillins Shortness Of Breath and Hives     Ace Inhibitors      Head tremor       No current facility-administered medications on file prior to encounter.   Current Outpatient Prescriptions on File Prior to Encounter:  hydrochlorothiazide (HYDRODIURIL) 25 MG tablet Take 1 tablet (25 mg) by mouth daily   FLONASE 50 MCG/ACT nasal spray Spray 2 sprays in nostril daily Brand necessary. Patient gets headache with generic.   propranolol HCl 60 MG TABS Take 1 tablet by mouth 20 mg as needed for tremors   loratadine-pseudoePHEDrine (CLARITIN-D 12 HOUR) 5-120 MG per tablet Take 1 tablet by mouth as needed Taking less than prior due to hypertension.   METHOCARBAMOL PO Take 250 mg by mouth as needed As needed   Multiple Vitamins-Minerals (CENTRUM PO)  Take  by mouth. Takes 1 daily   VITAMIN E-200 PO Take  by mouth. Takes 1 daily   naproxen (NAPROSYN) 500 MG tablet Take 500 mg by mouth 2 times daily (with meals).   [DISCONTINUED] amLODIPine (NORVASC) 5 MG tablet Take 1 tablet (5 mg) by mouth daily   STATIN NOT PRESCRIBED, INTENTIONAL, 1 each continuous prn. Statin not prescribed intentionally due to Refusal by patient   Hypertonic Nasal Wash (SINUS RINSE BOTTLE KIT NA) Spray 1 Bottle in nostril as needed.     Hemoglobin   Date Value Ref Range Status   05/05/2017 13.5 11.7 - 15.7 g/dL Final     Potassium   Date Value Ref Range Status   05/05/2017 3.4 3.4 - 5.3 mmol/L Final

## 2017-05-19 NOTE — DISCHARGE INSTRUCTIONS
Post-operative Instructions  Ophthalmic Plastic and Reconstructive Surgery  Faisal Alicea M.D.  Talmage Broadbent MD, PhD     All instructions apply to the operated eye(s) or eyelid(s).  Wound care and personal care  ? If a patch or bandage has been placed, please leave this in place until seen by your physician. Ensure that the bandage does not get wet when you take a shower.  ? Apply ice compresses for 15 minutes on 15 minutes off while awake for 2 days, then switch to warm water compresses 4 times a day until seen by your physician. For warm packs you can place a cup of dry uncooked rice in a clean cotton sock. Then place sock in microwave 30 seconds to one minute. Next place the warm sock into a plastic bag and wrap the bag with clean warm wet washcloth and place over operated eye.    ? You may shower or wash your hair the day after surgery. Do not bathe or go  swimming for 1 week to prevent contamination of your wounds.  ? Do not apply make-up to the eyes or eyelids for 2 weeks after surgery.  ? Expect some swelling, bruising, black eye (even into the lower eyelids and cheeks). Also expect serum caking, crusting and discharge from the eye and/or incisions. A small amount of surface bleeding is normal for the first 48 hours.  ? Your eye(s) and eyelid(s) may be painful and tender. This is normal after surgery.  Use the pain medication as prescribed. If your pain does not improve despite the  medication, contact the office.    Contact information and follow-up  ? Return to the Eye [facility] for a follow-up appointment with your physician as  scheduled. If no appointment has been scheduled, call [phone] for an  appointment with Dr. Alicea within 1 to 2 weeks from your date of surgery.  ? For severe pain, bleeding, or loss of vision, call the Eye Clinic at [phone].    An on call person can be reached after hours for concerns. The on call doctor should not call in medication refill requests after hours  or on weekends, so please plan accordingly. An effort has been made to provide adequate pain medications following every surgery, and refills will not be provided in most instances. Narcotic pain medications cannot be called in.      Activity restrictions and driving  ? Avoid heavy lifting, bending, exercise or strenuous activity for 1 week after surgery. You may resume other activities and return to work as tolerated.  ? You may not resume driving until have you stopped using narcotic pain medications  (such as Vicodin, Percocet, Tylenol #3).    Medications  ? Restart all your regular home medications and eye drops. If you take Plavix or  Aspirin on a regular basis, wait for 72 hours after your surgery before restarting these in order to decrease the risk of bleeding complications.  ? Avoid aspirin and aspirin-like medications (Motrin, Aleve, Ibuprofen, Mariana-  [place] etc) for 72 hours to reduce the risk of bleeding. You may take Tylenol  (acetaminophen) for pain.  ? In addition to your home medications, take the following post-operative medications as prescribed by your physician.     ? Apply antibiotic ointment to all sutures three times a day, and into the operated eye(s) at night.  ? Take pain pills at prescribed frequency as needed for pain.    ? WARNING: All the prescription pain medications listed above contain Tylenol  (acetaminophen). You must not take more than 4,000 mg of acetaminophen per  24-hour period. This is equivalent to 6 tablets of Darvocet, 8 tablets of Vicodin, or  12 tablets of Percocet or Tylenol #3. If you take other over-the-counter medications  containing acetaminophen, you must take the amount of acetaminophen into  account and reduce the number of prescribed pain pills accordingly.  ? The prescribed medications may make you drowsy. You must not drive a car,  operate heavy machinery or drink alcohol while taking them.  ? The prescribed pain medications may cause constipation and nausea.  Take them  with some food to prevent a stomach upset. If you continue to experience nausea,  call your physician.      Same Day Surgery Discharge Instructions for  Sedation and General Anesthesia       It's not unusual to feel dizzy, light-headed or faint for up to 24 hours after surgery or while taking pain medication.  If you have these symptoms: sit for a few minutes before standing and have someone assist you when you get up to walk or use the bathroom.      You should rest and relax for the next 24 hours. We recommend you make arrangements to have an adult stay with you for at least 24 hours after your discharge.  Avoid hazardous and strenuous activity.      DO NOT DRIVE any vehicle or operate mechanical equipment for 24 hours following the end of your surgery.  Even though you may feel normal, your reactions may be affected by the medication you have received.      Do not drink alcoholic beverages for 24 hours following surgery.       Slowly progress to your regular diet as you feel able. It's not unusual to feel nauseated and/or vomit after receiving anesthesia.  If you develop these symptoms, drink clear liquids (apple juice, ginger ale, broth, 7-up, etc. ) until you feel better.  If your nausea and vomiting persists for 24 hours, please notify your surgeon.        All narcotic pain medications, along with inactivity and anesthesia, can cause constipation. Drinking plenty of liquids and increasing fiber intake will help.      For any questions of a medical nature, call your surgeon.      Do not make important decisions for 24 hours.      If you had general anesthesia, you may have a sore throat for a couple of days related to the breathing tube used during surgery.  You may use Cepacol lozenges to help with this discomfort.  If it worsens or if you develop a fever, contact your surgeon.       If you feel your pain is not well managed with the pain medications prescribed by your surgeon, please contact your  surgeon's office to let them know so they can address your concerns.

## 2017-05-20 NOTE — OR NURSING
Post op call: Patient was unclear how to do ice packs for her eyes, saying frozen peas etc were not staying put. Advised to use washcloth and bowl of water with ice cubes and to keep reusing that. Writer emphasized necessity of doing the ice packs and patient verbalized better understanding of this need and method.

## 2017-05-26 ENCOUNTER — TRANSFERRED RECORDS (OUTPATIENT)
Dept: HEALTH INFORMATION MANAGEMENT | Facility: CLINIC | Age: 72
End: 2017-05-26

## 2017-06-12 ENCOUNTER — RADIANT APPOINTMENT (OUTPATIENT)
Dept: MAMMOGRAPHY | Facility: CLINIC | Age: 72
End: 2017-06-12
Attending: INTERNAL MEDICINE
Payer: COMMERCIAL

## 2017-06-12 DIAGNOSIS — Z12.31 VISIT FOR SCREENING MAMMOGRAM: ICD-10-CM

## 2017-06-12 PROCEDURE — G0202 SCR MAMMO BI INCL CAD: HCPCS | Mod: TC
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
## 2017-07-07 ENCOUNTER — OFFICE VISIT (OUTPATIENT)
Dept: INTERNAL MEDICINE | Facility: CLINIC | Age: 72
End: 2017-07-07
Payer: COMMERCIAL

## 2017-07-07 VITALS
TEMPERATURE: 98 F | BODY MASS INDEX: 22.58 KG/M2 | DIASTOLIC BLOOD PRESSURE: 62 MMHG | HEIGHT: 60 IN | WEIGHT: 115 LBS | HEART RATE: 82 BPM | SYSTOLIC BLOOD PRESSURE: 127 MMHG | OXYGEN SATURATION: 100 %

## 2017-07-07 DIAGNOSIS — I25.84 CORONARY ARTERY DISEASE DUE TO CALCIFIED CORONARY LESION: Primary | ICD-10-CM

## 2017-07-07 DIAGNOSIS — Z82.49 FAMILY HISTORY OF PREMATURE CAD: ICD-10-CM

## 2017-07-07 DIAGNOSIS — S32.010A COMPRESSION FRACTURE OF L1 LUMBAR VERTEBRA, CLOSED, INITIAL ENCOUNTER (H): ICD-10-CM

## 2017-07-07 DIAGNOSIS — I25.10 CORONARY ARTERY DISEASE DUE TO CALCIFIED CORONARY LESION: Primary | ICD-10-CM

## 2017-07-07 PROCEDURE — 99214 OFFICE O/P EST MOD 30 MIN: CPT | Performed by: INTERNAL MEDICINE

## 2017-07-07 NOTE — PATIENT INSTRUCTIONS
" Please call [phone] to schedule your cardiac stress test soon.     I do advise you to start taking a \"statin.\"  Examples of some statins are: simvastatin, Lipitor. The 2 statins which are less likely to cause muscle aches are Crestor and pravastatin.     Please start taking an aspirin 81mg daily-try to take it with food.     Please complete the \"DEXA bone scan\" (the test for osteoporosis) soon. Based on the results, we will know if we should treat you for osteoporosis.   "

## 2017-07-07 NOTE — MR AVS SNAPSHOT
"              After Visit Summary   7/7/2017    Mami Dumont    MRN: [id number]           Patient Information     Date Of Birth          1945        Visit Information        Provider Department      7/7/2017 10:30 AM Polina Curiel MD [facility]        Today's Diagnoses     Coronary artery disease due to calcified coronary lesion    -  1      Care Instructions     Please call [phone] to schedule your cardiac stress test soon.     I do advise you to start taking a \"statin.\"  Examples of some statins are: simvastatin, Lipitor. The 2 statins which are less likely to cause muscle aches are Crestor and pravastatin.     Please start taking an aspirin 81mg daily-try to take it with food.     Please complete the \"DEXA bone scan\" (the test for osteoporosis) soon. Based on the results, we will know if we should treat you for osteoporosis.           Follow-ups after your visit        Who to contact     If you have questions or need follow up information about today's clinic visit or your schedule please contact [facility] directly at [phone].  Normal or non-critical lab and imaging results will be communicated to you by Kybernesishart, letter or phone within 4 business days after the clinic has received the results. If you do not hear from us within 7 days, please contact the clinic through Notifot or phone. If you have a critical or abnormal lab result, we will notify you by phone as soon as possible.  Submit refill requests through Vignani or call your pharmacy and they will forward the refill request to us. Please allow 3 business days for your refill to be completed.          Additional Information About Your Visit        Kybernesishart Information     Vignani lets you send messages to your doctor, view your test results, renew your prescriptions, schedule appointments and more. To sign up, go to [email or web address] . Click on \"Log in\" on the left side of the screen, " "which will take you to the Welcome page. Then click on \"Sign up Now\" on the right side of the page.     You will be asked to enter the access code listed below, as well as some personal information. Please follow the directions to create your username and password.     Your access code is: [email or web address]  Expires: 2017 11:00 AM     Your access code will  in 90 days. If you need help or a new code, please call your [facility] [place] or [phone].        Care EveryWhere ID     This is your Care EveryWhere ID. This could be used by other organizations to access your [place] medical records  [id number]        Your Vitals Were     Pulse Temperature Height Pulse Oximetry BMI (Body Mass Index)       82 98  F (36.7  C) (Oral) 5' (1.524 m) 100% 22.46 kg/m2        Blood Pressure from Last 3 Encounters:   17 127/62   17 129/63   17 122/73    Weight from Last 3 Encounters:   17 115 lb (52.2 kg)   17 114 lb 3.2 oz (51.8 kg)   17 115 lb (52.2 kg)              Today, you had the following     No orders found for display         Today's Medication Changes          These changes are accurate as of: 17 11:05 AM.  If you have any questions, ask your nurse or doctor.               Start taking these medicines.        Dose/Directions    aspirin 81 MG EC tablet   Used for:  Coronary artery disease due to calcified coronary lesion        Dose:  81 mg   Take 1 tablet (81 mg) by mouth daily   Quantity:  90 tablet   Refills:  3         Stop taking these medicines if you haven't already. Please contact your care team if you have questions.     TYLENOL PO                Where to get your medicines      These medications were sent to [id number] - [place], [place] - [address] [facility]  [address]  [place] [address]    Hours:  24-hours Phone:  [phone]     aspirin 81 MG EC tablet                Primary Care Provider Office " Phone #    Yoel Cortez [facility] [phone]       No address on file        Equal Access to Services     RADHAHOME CONY : Hadii aad ku hadsarthaklorelei Sojonathon, wacollinsda luqadaha, qaybta kaalmada anurag, vance rachealin hayaaandrei gilmoremara kingston faustino wagner. So [facility] [phone].    ATENCIÓN: Si habla español, tiene a luna disposición servicios gratuitos de asistencia lingüística. Llame al [phone].    We comply with applicable federal civil rights laws and [place] laws. We do not discriminate on the basis of race, color, national origin, age, disability sex, sexual orientation or gender identity.            Thank you!     Thank you for choosing [facility]  for your care. Our goal is always to provide you with excellent care. Hearing back from our patients is one way we can continue to improve our services. Please take a few minutes to complete the written survey that you may receive in the mail after your visit with us. Thank you!             Your Updated Medication List - Protect others around you: Learn how to safely use, store and throw away your medicines at www.disposemymeds.org.          This list is accurate as of: 7/7/17 11:05 AM.  Always use your most recent med list.                   Brand Name Dispense Instructions for use Diagnosis    aspirin 81 MG EC tablet     90 tablet    Take 1 tablet (81 mg) by mouth daily    Coronary artery disease due to calcified coronary lesion       CENTRUM PO      Take  by mouth. Takes 1 daily        CLARITIN-D 12 HOUR 5-120 MG per 12 hr tablet   Generic drug:  loratadine-pseudoePHEDrine      Take 1 tablet by mouth as needed Taking less than prior due to hypertension.        FLONASE 50 MCG/ACT spray   Generic drug:  fluticasone     16 g    Spray 2 sprays in nostril daily Brand necessary. Patient gets headache with generic.    Seasonal allergic rhinitis       hydrochlorothiazide 25 MG tablet    HYDRODIURIL    90 tablet    Take 1 tablet (25 mg) by mouth daily    Benign  essential hypertension       naproxen 500 MG tablet    NAPROSYN     Take 500 mg by mouth 2 times daily (with meals).        propranolol HCl 60 MG Tabs      Take 1 tablet by mouth 20 mg as needed for tremors        SINUS RINSE BOTTLE KIT NA      Spray 1 Bottle in nostril as needed.    Seasonal allergic rhinitis       STATIN NOT PRESCRIBED (INTENTIONAL)     0 each    1 each continuous prn. Statin not prescribed intentionally due to Refusal by patient    Hyperlipidemia LDL goal <160       VITAMIN E-200 PO      Take  by mouth. Takes 1 daily
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
## 2017-07-09 PROBLEM — I25.84 CORONARY ARTERY DISEASE DUE TO CALCIFIED CORONARY LESION: Status: ACTIVE | Noted: 2017-07-09

## 2017-07-09 PROBLEM — S32.010A COMPRESSION FRACTURE OF L1 LUMBAR VERTEBRA, CLOSED, INITIAL ENCOUNTER (H): Status: ACTIVE | Noted: 2017-07-09

## 2017-07-09 PROBLEM — Z82.49 FAMILY HISTORY OF PREMATURE CAD: Status: ACTIVE | Noted: 2017-07-09

## 2017-07-09 PROBLEM — I25.10 CORONARY ARTERY DISEASE DUE TO CALCIFIED CORONARY LESION: Status: ACTIVE | Noted: 2017-07-09

## 2017-10-13 DIAGNOSIS — I10 BENIGN ESSENTIAL HYPERTENSION: ICD-10-CM

## 2017-10-13 RX ORDER — HYDROCHLOROTHIAZIDE 25 MG/1
TABLET ORAL
Qty: 90 TABLET | Refills: 0 | OUTPATIENT
Start: 2017-10-13

## 2017-12-12 DIAGNOSIS — I10 BENIGN ESSENTIAL HYPERTENSION: ICD-10-CM

## 2017-12-12 RX ORDER — HYDROCHLOROTHIAZIDE 25 MG/1
TABLET ORAL
OUTPATIENT
Start: 2017-12-12

## 2018-02-27 DIAGNOSIS — I10 BENIGN ESSENTIAL HYPERTENSION: ICD-10-CM

## 2018-02-28 DIAGNOSIS — I10 BENIGN ESSENTIAL HYPERTENSION: ICD-10-CM

## 2018-03-01 RX ORDER — HYDROCHLOROTHIAZIDE 25 MG/1
TABLET ORAL
Qty: 90 TABLET | Refills: 0 | Status: SHIPPED | OUTPATIENT
Start: 2018-03-01 | End: 2018-05-30

## 2018-03-01 RX ORDER — HYDROCHLOROTHIAZIDE 25 MG/1
TABLET ORAL
Qty: 30 TABLET | Refills: 0 | OUTPATIENT
Start: 2018-03-01

## 2018-05-29 ENCOUNTER — TRANSFERRED RECORDS (OUTPATIENT)
Dept: HEALTH INFORMATION MANAGEMENT | Facility: CLINIC | Age: 73
End: 2018-05-29

## 2018-05-30 DIAGNOSIS — I10 BENIGN ESSENTIAL HYPERTENSION: ICD-10-CM

## 2018-05-30 RX ORDER — HYDROCHLOROTHIAZIDE 25 MG/1
25 TABLET ORAL DAILY
Qty: 30 TABLET | Refills: 0 | Status: SHIPPED | OUTPATIENT
Start: 2018-05-30 | End: 2018-06-28

## 2018-05-30 NOTE — TELEPHONE ENCOUNTER
I don't know why this came to me. I don't think I've ever seen patient and overdue for appointment. Will forward to julia/kehr - seen in  Past for hctz refill. Claude Concepcion MD

## 2018-05-30 NOTE — TELEPHONE ENCOUNTER
I will send a 1 month supply-please call and advise the patient to schedule an appointment with me, within that time frame.  Thank you,  Polina Curiel MD

## 2018-05-30 NOTE — TELEPHONE ENCOUNTER
Routing refill request to provider for review/approval because:        Gini Del Rio, TANKN, RN

## 2018-05-30 NOTE — LETTER
May 31, 2018    Mami Dumont  [address] [facility] [address]      Dear Mireya,       We recently received a refill request for hydrochlorothiazide (HYDRODIURIL) 25 MG tablet.  We have refilled this for a one time 30 day supply only because you are due for a:    Hypertension office visit      Please call at your earliest convenience so that there will not be a delay with your future refills.          Thank you,   Your [facility] Team/aliyah  [phone]

## 2018-06-01 RX ORDER — HYDROCHLOROTHIAZIDE 25 MG/1
TABLET ORAL
Qty: 90 TABLET | Refills: 0 | OUTPATIENT
Start: 2018-06-01

## 2018-06-28 ENCOUNTER — DOCUMENTATION ONLY (OUTPATIENT)
Dept: LAB | Facility: CLINIC | Age: 73
End: 2018-06-28

## 2018-06-28 ENCOUNTER — OFFICE VISIT (OUTPATIENT)
Dept: FAMILY MEDICINE | Facility: CLINIC | Age: 73
End: 2018-06-28
Payer: COMMERCIAL

## 2018-06-28 VITALS
OXYGEN SATURATION: 97 % | TEMPERATURE: 98.3 F | RESPIRATION RATE: 12 BRPM | HEART RATE: 72 BPM | WEIGHT: 113 LBS | BODY MASS INDEX: 22.78 KG/M2 | DIASTOLIC BLOOD PRESSURE: 65 MMHG | SYSTOLIC BLOOD PRESSURE: 126 MMHG | HEIGHT: 59 IN

## 2018-06-28 DIAGNOSIS — I10 HYPERTENSION GOAL BP (BLOOD PRESSURE) < 140/90: Primary | ICD-10-CM

## 2018-06-28 PROCEDURE — 99213 OFFICE O/P EST LOW 20 MIN: CPT | Performed by: FAMILY MEDICINE

## 2018-06-28 RX ORDER — HYDROCHLOROTHIAZIDE 25 MG/1
25 TABLET ORAL DAILY
Qty: 90 TABLET | Refills: 3 | Status: SHIPPED | OUTPATIENT
Start: 2018-06-28 | End: 2019-10-24 | Stop reason: SINTOL

## 2018-06-28 ASSESSMENT — PAIN SCALES - GENERAL: PAINLEVEL: NO PAIN (0)

## 2018-06-28 NOTE — MR AVS SNAPSHOT
After Visit Summary   6/28/2018    Mami Dumont    MRN: [id number]           Patient Information     Date Of Birth          1945        Visit Information        Provider Department      6/28/2018 2:00 PM Nando Broussard MD [facility]        Today's Diagnoses     Hypertension goal BP (blood pressure) < 140/90    -  1      Care Instructions    I recommended that she return to clinic for an appointment in the next few month(s)  for her yearly complete physical exam and we will get all of her preventative medical needs taken care of at that time. I also recommended that he have a laboratory appointment 1 week prior to that to do her fasting laboratory work.            Follow-ups after your visit        Your next 10 appointments already scheduled     Jul 03, 2018 10:40 AM CDT   [facility]    [address] [facility] [address] [phone]              Who to contact     If you have questions or need follow up information about today's clinic visit or your schedule please contact [facility] directly at [phone].  Normal or non-critical lab and imaging results will be communicated to you by MyChart, letter or phone within 4 business days after the clinic has received the results. If you do not hear from us within 7 days, please contact the clinic through MyChart or phone. If you have a critical or abnormal lab result, we will notify you by phone as soon as possible.  Submit refill requests through Zooppat or call your pharmacy and they will forward the refill request to us. Please allow 3 business days for your refill to be completed.          Additional Information About Your Visit        Care EveryWhere ID     This is your Care EveryWhere ID. This could be used by other organizations to access your [place] medical records  [id number]        Your Vitals Were     Pulse  "Temperature Respirations Height Pulse Oximetry BMI (Body Mass Index)    72 98.3  F (36.8  C) (Oral) 12 4' 11\" (1.499 m) 97% 22.82 kg/m2       Blood Pressure from Last 3 Encounters:   06/28/18 126/65   07/07/17 127/62   05/19/17 129/63    Weight from Last 3 Encounters:   06/28/18 113 lb (51.3 kg)   07/07/17 115 lb (52.2 kg)   05/19/17 114 lb 3.2 oz (51.8 kg)              We Performed the Following     Basic metabolic panel          Where to get your medicines      These medications were sent to [facility] - [place], [place] - [address] [facility] [address]  [address], [place] [address]    Hours:  24-hours Phone:  [phone]     hydrochlorothiazide 25 MG tablet          Primary Care Provider Fax #    Physician No Ref-Primary [phone]       No address on file        Equal Access to Services     HOME DONNELLY : Hadcoby pano Soomaali, waaxda luqadaha, qaybta kaalmada adeegyacaitlyn, vance harmon . So [facility] [phone].    ATENCIÓN: Si habla español, tiene a luna disposición servicios gratuitos de asistencia lingüística. Llame al [phone].    We comply with applicable federal civil rights laws and [place] laws. We do not discriminate on the basis of race, color, national origin, age, disability, sex, sexual orientation, or gender identity.            Thank you!     [facility]  for your care. Our goal is always to provide you with excellent care. Hearing back from our patients is one way we can continue to improve our services. Please take a few minutes to complete the written survey that you may receive in the mail after your visit with us. Thank you!             Your Updated Medication List - Protect others around you: Learn how to safely use, store and throw away your medicines at www.disposemymeds.org.          This list is accurate as of 6/28/18  2:23 PM.  Always use your most recent " med list.                   Brand Name Dispense Instructions for use Diagnosis    aspirin 81 MG EC tablet     90 tablet    Take 1 tablet (81 mg) by mouth daily    Coronary artery disease due to calcified coronary lesion       CENTRUM PO      Take  by mouth. Takes 1 daily        CLARITIN-D 12 HOUR 5-120 MG per 12 hr tablet   Generic drug:  loratadine-pseudoePHEDrine      Take 1 tablet by mouth as needed Taking less than prior due to hypertension.        FLONASE 50 MCG/ACT spray   Generic drug:  fluticasone     16 g    Spray 2 sprays in nostril daily Brand necessary. Patient gets headache with generic.    Seasonal allergic rhinitis       hydrochlorothiazide 25 MG tablet    HYDRODIURIL    90 tablet    Take 1 tablet (25 mg) by mouth daily    Hypertension goal BP (blood pressure) < 140/90       naproxen 500 MG tablet    NAPROSYN     Take 500 mg by mouth 2 times daily (with meals).        propranolol HCl 60 MG Tabs      Take 1 tablet by mouth 20 mg as needed for tremors        SINUS RINSE BOTTLE KIT NA      Spray 1 Bottle in nostril as needed.    Seasonal allergic rhinitis       STATIN NOT PRESCRIBED (INTENTIONAL)     0 each    1 each continuous prn. Statin not prescribed intentionally due to Refusal by patient    Hyperlipidemia LDL goal <160       VITAMIN E-200 PO      Take  by mouth. Takes 1 daily
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
## 2018-06-28 NOTE — PROGRESS NOTES
Your patient did not come to lab today for the blood work you had ordered at today's visit with you.  I canceled today's order and pended future orders for this patient IN TODAY'S ENCOUNTER.  Patient will need to be contacted by your team to return to lab for it if needed.  [facility]

## 2018-06-28 NOTE — NURSING NOTE
"Chief Complaint   Patient presents with     Hypertension     Health Maintenance     orders pedned, fall risk, PHQ-2, adv dir       Initial /65  Pulse 72  Temp 98.3  F (36.8  C) (Oral)  Resp 12  Ht 4' 11\" (1.499 m)  Wt 113 lb (51.3 kg)  SpO2 97%  BMI 22.82 kg/m2 Estimated body mass index is 22.82 kg/(m^2) as calculated from the following:    Height as of this encounter: 4' 11\" (1.499 m).    Weight as of this encounter: 113 lb (51.3 kg)..  BP completed using cuff size: regular    "

## 2018-06-28 NOTE — PATIENT INSTRUCTIONS
I recommended that she return to clinic for an appointment in the next few month(s)  for her yearly complete physical exam and we will get all of her preventative medical needs taken care of at that time. I also recommended that he have a laboratory appointment 1 week prior to that to do her fasting laboratory work.

## 2018-07-03 ENCOUNTER — OFFICE VISIT (OUTPATIENT)
Dept: OPTOMETRY | Facility: CLINIC | Age: 73
End: 2018-07-03
Payer: COMMERCIAL

## 2018-07-03 DIAGNOSIS — H40.003 GLAUCOMA SUSPECT, BILATERAL: ICD-10-CM

## 2018-07-03 DIAGNOSIS — H52.4 PRESBYOPIA: Primary | ICD-10-CM

## 2018-07-03 DIAGNOSIS — H52.223 REGULAR ASTIGMATISM OF BOTH EYES: ICD-10-CM

## 2018-07-03 PROCEDURE — 92014 COMPRE OPH EXAM EST PT 1/>: CPT | Performed by: OPTOMETRIST

## 2018-07-03 PROCEDURE — 92015 DETERMINE REFRACTIVE STATE: CPT | Performed by: OPTOMETRIST

## 2018-07-03 ASSESSMENT — CONF VISUAL FIELD
OD_NORMAL: 1
OS_NORMAL: 1
METHOD: COUNTING FINGERS

## 2018-07-03 ASSESSMENT — TONOMETRY
IOP_METHOD: APPLANATION
OS_IOP_MMHG: 16
OD_IOP_MMHG: 18

## 2018-07-03 ASSESSMENT — VISUAL ACUITY
OS_SC: 20/40-1
OS_CC: 20/40
OD_SC+: -1
OS_SC+: -2
CORRECTION_TYPE: GLASSES
OD_SC: 20/30
METHOD: SNELLEN - LINEAR
OS_SC: 20/30
OD_CC: 20/25
OD_SC: 20/40-1

## 2018-07-03 ASSESSMENT — PACHYMETRY
OS_CT(UM): 636
OD_CT(UM): 630

## 2018-07-03 ASSESSMENT — REFRACTION_WEARINGRX
OS_SPHERE: +1.75
SPECS_TYPE: OTC'S
OD_SPHERE: +1.75

## 2018-07-03 ASSESSMENT — KERATOMETRY
OS_AXISANGLE2_DEGREES: 149
OD_K2POWER_DIOPTERS: 48.00
OD_K1POWER_DIOPTERS: 46.25
OS_K2POWER_DIOPTERS: 46.75
OS_K1POWER_DIOPTERS: 46.00
OD_AXISANGLE2_DEGREES: 178

## 2018-07-03 ASSESSMENT — EXTERNAL EXAM - RIGHT EYE: OD_EXAM: BROW PTOSIS

## 2018-07-03 ASSESSMENT — SLIT LAMP EXAM - LIDS
COMMENTS: 1+ DERMATOCHALASIS: UPPER LID
COMMENTS: 1+ DERMATOCHALASIS: UPPER LID

## 2018-07-03 ASSESSMENT — REFRACTION_MANIFEST
OD_CYLINDER: +0.75
OS_CYLINDER: +1.25
OS_AXIS: 39
OS_AXIS: 035
OS_SPHERE: +0.50
OS_CYLINDER: +0.25
OS_ADD: +2.25
OD_SPHERE: PLANO
OS_SPHERE: +0.25
OD_AXIS: 118
OD_SPHERE: 0.00
OD_ADD: +2.25
OD_CYLINDER: +0.75
METHOD_AUTOREFRACTION: 1
OD_AXIS: 135

## 2018-07-03 ASSESSMENT — CUP TO DISC RATIO
OS_RATIO: 0.5
OD_RATIO: 0.5

## 2018-07-03 ASSESSMENT — EXTERNAL EXAM - LEFT EYE: OS_EXAM: BROW PTOSIS

## 2018-07-03 NOTE — PATIENT INSTRUCTIONS
Patient was advised of today's exam findings.  Over the counter reading glasses as needed  Monitor mild cataracts   See Dr Preciado for glaucoma evaluation - pressure check, nerve OCT and visual field testing   Return in 1 year for eye exam    Mireya Lira O.D.  [facility]   [address] [place] [address] [phone]    Keyur Preciado and Angel  [facility]   [address] [place] [address]. [place], [place] [address] [phone]

## 2018-07-03 NOTE — MR AVS SNAPSHOT
After Visit Summary   7/3/2018    Mami Dumont    MRN: [id number]           Patient Information     Date Of Birth          1945        Visit Information        Provider Department      7/3/2018 10:40 AM Mireya Lira OD [facility]        Today's Diagnoses     Presbyopia    -  1      Care Instructions    Patient was advised of today's exam findings.  Over the counter reading glasses as needed  Monitor mild cataracts   See Dr Preciado for glaucoma evaluation - pressure check, nerve OCT and visual field testing   Return in 1 year for eye exam    Mireya Lira O.D.  [facility]   [address] [place], [place] [id number] [phone]    Keyur Preciado and Angel  [facility]   [address]  [place] [id number] [phone]                       Follow-ups after your visit        Who to contact     If you have questions or need follow up information about today's clinic visit or your schedule please contact [facility] directly at [phone].  Normal or non-critical lab and imaging results will be communicated to you by MyChart, letter or phone within 4 business days after the clinic has received the results. If you do not hear from us within 7 days, please contact the clinic through MyChart or phone. If you have a critical or abnormal lab result, we will notify you by phone as soon as possible.  Submit refill requests through MYR or call your pharmacy and they will forward the refill request to us. Please allow 3 business days for your refill to be completed.          Additional Information About Your Visit        Care EveryWhere ID     This is your Care EveryWhere ID. This could be used by other organizations to access your [facility] medical records  [id number]         Blood Pressure from Last 3 Encounters:   06/28/18 126/65   07/07/17 127/62   05/19/17 129/63    Weight from Last 3 Encounters:   06/28/18 51.3  kg (113 lb)   07/07/17 52.2 kg (115 lb)   05/19/17 51.8 kg (114 lb 3.2 oz)              Today, you had the following     No orders found for display       Primary Care Provider Office Phone # Fax #    [facility] [phone] [address] [facility] [address]        Equal Access to Services     SAMAN DONNELLY : Hadii aad ku hadasho Soomaali, waaxda luqadaha, qaybta kaalmada adeegyada, waxay idiin hayaan adeeg nereydash la'aan . So [facility] [phone].    ATENCIÓN: Si habla español, tiene a luna disposición servicios gratuitos de asistencia lingüística. Llame al [phone].    We comply with applicable federal civil rights laws and [place] laws. We do not discriminate on the basis of race, color, national origin, age, disability, sex, sexual orientation, or gender identity.            Thank you!     Thank you for choosing [facility]  for your care. Our goal is always to provide you with excellent care. Hearing back from our patients is one way we can continue to improve our services. Please take a few minutes to complete the written survey that you may receive in the mail after your visit with us. Thank you!             Your Updated Medication List - Protect others around you: Learn how to safely use, store and throw away your medicines at www.disposemymeds.org.          This list is accurate as of 7/3/18 12:09 PM.  Always use your most recent med list.                   Brand Name Dispense Instructions for use Diagnosis    aspirin 81 MG EC tablet     90 tablet    Take 1 tablet (81 mg) by mouth daily    Coronary artery disease due to calcified coronary lesion       CENTRUM PO      Take  by mouth. Takes 1 daily        CLARITIN-D 12 HOUR 5-120 MG per 12 hr tablet   Generic drug:  loratadine-pseudoePHEDrine      Take 1 tablet by mouth as needed Taking less than prior due to hypertension.        FLONASE 50 MCG/ACT spray   Generic drug:  fluticasone     16 g    Spray 2 sprays  in nostril daily Brand necessary. Patient gets headache with generic.    Seasonal allergic rhinitis       hydrochlorothiazide 25 MG tablet    HYDRODIURIL    90 tablet    Take 1 tablet (25 mg) by mouth daily    Hypertension goal BP (blood pressure) < 140/90       naproxen 500 MG tablet    NAPROSYN     Take 500 mg by mouth 2 times daily (with meals).        propranolol HCl 60 MG Tabs      Take 1 tablet by mouth 20 mg as needed for tremors        SINUS RINSE BOTTLE KIT NA      Spray 1 Bottle in nostril as needed.    Seasonal allergic rhinitis       STATIN NOT PRESCRIBED (INTENTIONAL)     0 each    1 each continuous prn. Statin not prescribed intentionally due to Refusal by patient    Hyperlipidemia LDL goal <160       VITAMIN E-200 PO      Take  by mouth. Takes 1 daily

## 2018-07-03 NOTE — PROGRESS NOTES
Chief Complaint   Patient presents with     COMPREHENSIVE EYE EXAM     yearly         Last Eye Exam: 2/16/2017  Dilated Previously: Yes    What are you currently using to see?  Readers, uses them for print and at times on the computer        Distance Vision Acuity: Satisfied with vision, no changes noted     Near Vision Acuity: Satisfied with vision while reading and using computer with readers    Eye Comfort: good  Do you use eye drops? : Not really, sometimes uses an allergy drop as needed   Occupation or Hobbies: Retired     [employer] Optometric Assistant           Medical, surgical and family histories reviewed and updated 7/3/2018.       OBJECTIVE: See Ophthalmology exam    ASSESSMENT:    ICD-10-CM    1. Presbyopia H52.4 EYE EXAM (SIMPLE-NONBILLABLE)     REFRACTION   2. Regular astigmatism of both eyes H52.223 EYE EXAM (SIMPLE-NONBILLABLE)     REFRACTION   3. Glaucoma suspect, bilateral H40.003 EYE EXAM (SIMPLE-NONBILLABLE)     OPHTHALMOLOGY ADULT REFERRAL      PLAN:     Patient Instructions   Patient was advised of today's exam findings.  Over the counter reading glasses as needed  Monitor mild cataracts   See Dr Preciado for glaucoma evaluation - pressure check, nerve OCT and visual field testing   Return in 1 year for eye exam    Mireya Lira O.D.  [facility]   [address] [place], [place] [id number] [phone]    Keyur Preciado and Angel  [facility]   [address] [place] [id number] [phone]

## 2018-08-13 ENCOUNTER — OFFICE VISIT (OUTPATIENT)
Dept: OPHTHALMOLOGY | Facility: CLINIC | Age: 73
End: 2018-08-13
Payer: COMMERCIAL

## 2018-08-13 DIAGNOSIS — H40.003 GLAUCOMA SUSPECT OF BOTH EYES: Primary | ICD-10-CM

## 2018-08-13 PROCEDURE — 92133 CPTRZD OPH DX IMG PST SGM ON: CPT | Performed by: OPHTHALMOLOGY

## 2018-08-13 PROCEDURE — 92083 EXTENDED VISUAL FIELD XM: CPT | Performed by: OPHTHALMOLOGY

## 2018-08-13 PROCEDURE — 92012 INTRM OPH EXAM EST PATIENT: CPT | Performed by: OPHTHALMOLOGY

## 2018-08-13 ASSESSMENT — VISUAL ACUITY
OS_SC+: +2
OS_SC: 20/40
OD_SC: 20/30
METHOD: SNELLEN - LINEAR

## 2018-08-13 ASSESSMENT — REFRACTION_MANIFEST
OS_SPHERE: PLANO
OS_ADD: +3.00
OS_AXIS: 030
OS_CYLINDER: +0.75
OD_CYLINDER: +0.75
OD_ADD: +3.00
OD_SPHERE: -0.25
OD_AXIS: 130

## 2018-08-13 ASSESSMENT — SLIT LAMP EXAM - LIDS
COMMENTS: 1+ DERMATOCHALASIS: UPPER LID
COMMENTS: 1+ DERMATOCHALASIS: UPPER LID

## 2018-08-13 ASSESSMENT — EXTERNAL EXAM - RIGHT EYE: OD_EXAM: BROW PTOSIS

## 2018-08-13 ASSESSMENT — EXTERNAL EXAM - LEFT EYE: OS_EXAM: BROW PTOSIS

## 2018-08-13 NOTE — PATIENT INSTRUCTIONS
Continue observation with regard to glaucoma suspect status.  Call in April 2019 for an appointment in August 2019 for Complete Exam.    Dr. Preciado [phone]

## 2018-08-13 NOTE — PROGRESS NOTES
Current Eye Medications:  None     Subjective:  TA, OCT, HVF.  Patient feels that her eyes are unchanged.    Had lid repair with AH.     Objective:  See Ophthalmology Exam.       Assessment:  Stable intraocular pressure, glaucoma OCT, and Egan Visual Field both eyes in patient who is a glaucoma suspect.  Mild cataract both eyes.      Plan:  Continue observation with regard to glaucoma suspect status.  Call in April 2019 for an appointment in August 2019 for Complete Exam.    Dr. Preciado [phone]

## 2018-08-13 NOTE — LETTER
8/13/2018         RE: Mami Dumont  [address] [place] [address]        Dear Colleague,    Thank you for referring your patient, Mami Dumont, to the [facility]. Please see a copy of my visit note below.     Current Eye Medications:  None     Subjective:  TA, OCT, HVF.  Patient feels that her eyes are unchanged.    Had lid repair with AH.     Objective:  See Ophthalmology Exam.       Assessment:  Stable intraocular pressure, glaucoma OCT, and Egan Visual Field both eyes in patient who is a glaucoma suspect.  Mild cataract both eyes.      Plan:  Continue observation with regard to glaucoma suspect status.  Call in April 2019 for an appointment in August 2019 for Complete Exam.    Dr. Preciado [phone]           Again, thank you for allowing me to participate in the care of your patient.        Sincerely,        Alvin Perciado MD

## 2018-08-13 NOTE — MR AVS SNAPSHOT
After Visit Summary   8/13/2018    Mami Dumont    MRN: [id number]           Patient Information     Date Of Birth          1945        Visit Information        Provider Department      8/13/2018 9:45 AM Alvin Preciado MD [facility]        Today's Diagnoses     Glaucoma suspect of both eyes    -  1      Care Instructions    Continue observation with regard to glaucoma suspect status.  Call in April 2019 for an appointment in August 2019 for Complete Exam.    Dr. Preciado [phone]            Follow-ups after your visit        Who to contact     If you have questions or need follow up information about today's clinic visit or your schedule please contact [facility] directly at [phone].  Normal or non-critical lab and imaging results will be communicated to you by MyChart, letter or phone within 4 business days after the clinic has received the results. If you do not hear from us within 7 days, please contact the clinic through MyChart or phone. If you have a critical or abnormal lab result, we will notify you by phone as soon as possible.  Submit refill requests through Maozhao or call your pharmacy and they will forward the refill request to us. Please allow 3 business days for your refill to be completed.          Additional Information About Your Visit        Care EveryWhere ID     This is your Care EveryWhere ID. This could be used by other organizations to access your [facility] medical records  [id number]         Blood Pressure from Last 3 Encounters:   06/28/18 126/65   07/07/17 127/62   05/19/17 129/63    Weight from Last 3 Encounters:   06/28/18 51.3 kg (113 lb)   07/07/17 52.2 kg (115 lb)   05/19/17 51.8 kg (114 lb 3.2 oz)              Today, you had the following     No orders found for display       Primary Care Provider Office Phone # Fax #    [facility] [phone] [address] [facility] [address]         Equal Access to Services     [facility] : Hadii aad ku hadsarthaklorelei Cristinajonathon, waaxda luqadaha, qaybta kastivencaitlyn osborn, vance wagner. So [facility] [phone].    ATENCIÓN: Si habla malachi, tiene a luna disposición servicios gratuitos de asistencia lingüística. Tankame al [phone].    We comply with applicable federal civil rights laws and [place] laws. We do not discriminate on the basis of race, color, national origin, age, disability, sex, sexual orientation, or gender identity.            Thank you!     Thank you for choosing [facility]  for your care. Our goal is always to provide you with excellent care. Hearing back from our patients is one way we can continue to improve our services. Please take a few minutes to complete the written survey that you may receive in the mail after your visit with us. Thank you!             Your Updated Medication List - Protect others around you: Learn how to safely use, store and throw away your medicines at www.disposemymeds.org.          This list is accurate as of 8/13/18 10:51 AM.  Always use your most recent med list.                   Brand Name Dispense Instructions for use Diagnosis    aspirin 81 MG EC tablet     90 tablet    Take 1 tablet (81 mg) by mouth daily    Coronary artery disease due to calcified coronary lesion       CENTRUM PO      Take  by mouth. Takes 1 daily        CLARITIN-D 12 HOUR 5-120 MG per 12 hr tablet   Generic drug:  loratadine-pseudoePHEDrine      Take 1 tablet by mouth as needed Taking less than prior due to hypertension.        FLONASE 50 MCG/ACT spray   Generic drug:  fluticasone     16 g    Spray 2 sprays in nostril daily Brand necessary. Patient gets headache with generic.    Seasonal allergic rhinitis       hydrochlorothiazide 25 MG tablet    HYDRODIURIL    90 tablet    Take 1 tablet (25 mg) by mouth daily    Hypertension goal BP (blood pressure) < 140/90       naproxen 500 MG tablet    NAPROSYN      Take 500 mg by mouth 2 times daily (with meals).        propranolol HCl 60 MG Tabs      Take 1 tablet by mouth 20 mg as needed for tremors        SINUS RINSE BOTTLE KIT NA      Spray 1 Bottle in nostril as needed.    Seasonal allergic rhinitis       STATIN NOT PRESCRIBED (INTENTIONAL)     0 each    1 each continuous prn. Statin not prescribed intentionally due to Refusal by patient    Hyperlipidemia LDL goal <160       VITAMIN E-200 PO      Take  by mouth. Takes 1 daily

## 2018-08-16 ASSESSMENT — PACHYMETRY
OD_CT(UM): 630
OS_CT(UM): 636

## 2018-10-15 ENCOUNTER — TRANSFERRED RECORDS (OUTPATIENT)
Dept: HEALTH INFORMATION MANAGEMENT | Facility: CLINIC | Age: 73
End: 2018-10-15

## 2018-10-16 PROBLEM — K64.8 INTERNAL HEMORRHOIDS: Status: ACTIVE | Noted: 2018-10-16

## 2018-10-29 ENCOUNTER — TELEPHONE (OUTPATIENT)
Dept: FAMILY MEDICINE | Facility: CLINIC | Age: 73
End: 2018-10-29

## 2018-10-29 NOTE — TELEPHONE ENCOUNTER
Panel Management Review      Patient has the following on her problem list:       IVD   ASA: Passed    Last LDL:    Lab Results   Component Value Date    CHOL 249 10/07/2015     Lab Results   Component Value Date    HDL 63 10/07/2015     Lab Results   Component Value Date     10/07/2015     Lab Results   Component Value Date    TRIG 143 10/07/2015        Lab Results   Component Value Date    CHOLHDLRATIO 4.0 10/07/2015        Is the patient on a Statin? YES   Is the patient on Aspirin? YES                  Medications     HMG CoA Reductase Inhibitors    STATIN NOT PRESCRIBED, INTENTIONAL,    Salicylates    aspirin 81 MG EC tablet          Last three blood pressure readings:  BP Readings from Last 3 Encounters:   06/28/18 126/65   07/07/17 127/62   05/19/17 129/63        Tobacco History:     History   Smoking Status     Never Smoker   Smokeless Tobacco     Never Used     Comment: Lives in smoke free household         Composite cancer screening  Chart review shows that this patient is due/due soon for the following Dexa  Summary:    Patient is due/failing the following:   dexa    Action needed:   none    Type of outreach:    none    Questions for provider review:    None                                                                                                                                    Licha Escudero cma       Chart routed to closed .

## 2019-10-14 ENCOUNTER — TELEPHONE (OUTPATIENT)
Dept: FAMILY MEDICINE | Facility: CLINIC | Age: 74
End: 2019-10-14

## 2019-10-14 NOTE — TELEPHONE ENCOUNTER
Reason for call:  Patient reporting a symptom    Symptom or request: leg cramps    Duration (how long have symptoms been present): weeks    Have you been treated for this before? No    Additional comments: patient stated she did stop one of her BP medications thinking this was causing the leg cramps but she is still having them nightly     Phone Number patient can be reached at:  Cell number on file:    Telephone Information:   Mobile [phone]       Best Time:      Can we leave a detailed message on this number:  YES    Call taken on 10/14/2019 at 1:29 PM by Andra Arevalo

## 2019-10-14 NOTE — TELEPHONE ENCOUNTER
Returned call to patient.     Patient has been having leg cramps for a couple weeks.   These wake her up in the middle of the night and feel like he horses in her upper thighs.   Patient wonders what could be causing these.     This RN informed patient that several things can cause cramping and she needs to be evaluated.  Patient has not been seen since June 2018.  Patient made appointment to see Dr Atkinson 10/21/19.  Patient may come into urgent care this evening instead.     Gini FUN, RN

## 2019-10-18 NOTE — PROGRESS NOTES
Subjective   Patient is new to me.  Mami Dumont is a 74 year old female who presents to clinic today for the following health issues:  She reports muscle cramps during sleep, wake her up from sleep, for several weeks.  Patient denies any problem with sleep.  She does not snore.  She wakes up refreshed in the morning.  She was on hydrochlorothiazide 25 mg for hypertension and she believes causing her leg cramps.  She stopped taking a thiazide several days ago.  Cramps occur 1-2 times per week.  No extra symptoms.  Patient denies any chest pain, shortness of breath, palpitations, headache, dizziness, numbness or tingling.  HPI   Musculoskeletal problem/pain      Duration: over 1 week    Description  Location: both legs, cramps wake patient up in the middle of the night, also has pain    Intensity:  severe    Accompanying signs and symptoms: radiation of pain from back down to leg, cramps are just in leg no radiation    History  Previous similar problem: YES- leg cramps in the past  Previous evaluation:  none    Precipitating or alleviating factors:  Trauma or overuse: YES- just moved so did a lot of lifting and moving things  Aggravating factors include: Cramps have went away a little since stopped hydrochlorothiazide and drinking more water    Therapies tried and outcome: stretching, acetaminophen, Ibuprofen and chiropractor    Hypertension Follow-up      Do you check your blood pressure regularly outside of the clinic? No     Are you following a low salt diet? No    Are your blood pressures ever more than 140 on the top number (systolic) OR more   than 90 on the bottom number (diastolic), for example 140/90? N/A    Patient Active Problem List   Diagnosis     Allergic rhinitis due to animal dander     Seasonal allergic rhinitis     House dust mite allergy     Rhinitis, allergic to other allergen     Seasonal allergic conjunctivitis     Diagnostic skin and sensitization tests     Advanced directives,  counseling/discussion     CARDIOVASCULAR SCREENING; LDL GOAL LESS THAN 160     Hyperlipidemia LDL goal <160     Heme positive stool     Dermatochalasis, ou     Brow ptosis, ou     Tubular adenoma of colon     Hypertension goal BP (blood pressure) < 140/90     Essential tremor     Glaucoma suspect of both eyes     Coronary artery disease due to calcified coronary lesion     Compression fracture of L1 lumbar vertebra, closed, initial encounter (H)     Family history of premature CAD     Internal hemorrhoids     Past Surgical History:   Procedure Laterality Date     BLEPHAROPLASTY, BROW LIFT BILATERAL, COMBINED  5/2/13    with snip punctoplasty,[place]     BLEPHAROPLASTY, BROW LIFT BILATERAL, COMBINED Bilateral 5/19/2017    Procedure: COMBINED BLEPHAROPLASTY, BROW LIFT BILATERAL;  BILATERAL UPPER LID BLEPHAROPLASTY WITH BROW PTOSIS ;  Surgeon: Faisal Alicea MD;  Location:  [facility]     ENDOSCOPIC POLYPECTOMY NASAL  1960    nasal polyp removal       Social History     Tobacco Use     Smoking status: Never Smoker     Smokeless tobacco: Never Used     Tobacco comment: Lives in smoke free household   Substance Use Topics     Alcohol use: Yes     Comment: occassional     Family History   Problem Relation Age of Onset     Cancer Mother      Diabetes Mother      Hypertension Mother      Cerebrovascular Disease Mother      Glaucoma Mother 70        one eye-sudden  loss of vision upon waking     Hypertension Brother      Hypertension Brother      Glaucoma Brother      Thyroid Disease No family hx of      Macular Degeneration No family hx of          Current Outpatient Medications   Medication Sig Dispense Refill     amLODIPine (NORVASC) 5 MG tablet Take 1 tablet (5 mg) by mouth daily 30 tablet 1     aspirin 81 MG EC tablet Take 1 tablet (81 mg) by mouth daily 90 tablet 3     FLONASE 50 MCG/ACT nasal spray Spray 2 sprays in nostril daily Brand necessary. Patient gets headache with generic. 16 g 3     Hypertonic Nasal Wash  (SINUS RINSE BOTTLE KIT NA) Spray 1 Bottle in nostril as needed.       loratadine-pseudoePHEDrine (CLARITIN-D 12 HOUR) 5-120 MG per tablet Take 1 tablet by mouth as needed Taking less than prior due to hypertension.       Multiple Vitamins-Minerals (CENTRUM PO) Take  by mouth. Takes 1 daily       naproxen (NAPROSYN) 500 MG tablet Take 500 mg by mouth 2 times daily (with meals).       propranolol HCl 60 MG TABS Take 1 tablet by mouth 20 mg as needed for tremors       STATIN NOT PRESCRIBED, INTENTIONAL, 1 each continuous prn. Statin not prescribed intentionally due to Refusal by patient 0 each 0     VITAMIN E-200 PO Take  by mouth. Takes 1 daily       hydrochlorothiazide (HYDRODIURIL) 25 MG tablet Take 1 tablet (25 mg) by mouth daily (Patient not taking: Reported on 10/21/2019) 90 tablet 3     Allergies   Allergen Reactions     Penicillins Shortness Of Breath and Hives     Ace Inhibitors      Head tremor     Recent Labs   Lab Test 05/05/17  1111 10/07/15  0900  11/21/13  1312 04/17/13  0757   LDL  --  157*  --   --  164*   HDL  --  63  --   --  59   TRIG  --  143  --   --  105   ALT  --  29  --   --   --    CR 0.87 0.92   < > 0.80  --    GFRESTIMATED 64 61   < > 72  --    GFRESTBLACK 77 73   < > 87  --    POTASSIUM 3.4 3.7   < > 4.8  --    TSH  --   --   --  1.35  --     < > = values in this interval not displayed.      BP Readings from Last 3 Encounters:   10/21/19 (!) 140/85   06/28/18 126/65   07/07/17 127/62    Wt Readings from Last 3 Encounters:   10/21/19 50.8 kg (112 lb)   06/28/18 51.3 kg (113 lb)   07/07/17 52.2 kg (115 lb)                    Reviewed and updated as needed this visit by Provider  Tobacco  Allergies  Meds  Problems  Med Hx  Surg Hx  Fam Hx         Review of Systems   Constitutional: Negative for activity change, appetite change, chills, diaphoresis, fatigue, fever and unexpected weight change.   HENT: Negative.    Respiratory: Negative.    Cardiovascular: Negative.    Gastrointestinal:  "Negative.    Endocrine: Negative.    Breasts:  negative.    Genitourinary: Negative.    Musculoskeletal: Negative.         Leg cramps   Skin: Negative.    Allergic/Immunologic: Negative.    Neurological: Negative.    Hematological: Negative.    Psychiatric/Behavioral: Negative.             Objective    BP (!) 140/85   Pulse 71   Temp 98.2  F (36.8  C) (Oral)   Ht 1.499 m (4' 11\")   Wt 50.8 kg (112 lb)   SpO2 98%   BMI 22.62 kg/m    Body mass index is 22.62 kg/m .  Physical Exam  Vitals signs and nursing note reviewed.   Constitutional:       General: She is not in acute distress.     Appearance: Normal appearance. She is not ill-appearing or diaphoretic.   HENT:      Head: Normocephalic and atraumatic.      Mouth/Throat:      Mouth: Mucous membranes are moist.   Eyes:      Pupils: Pupils are equal, round, and reactive to light.   Neck:      Musculoskeletal: Normal range of motion.   Neurological:      Mental Status: She is alert.                    Assessment & Plan     1. Nocturnal leg cramps  She reports muscle cramps during sleep, wake her up from sleep, for several weeks.  Patient denies any problem with sleep.  She does not snore.  She wakes up refreshed in the morning.  She was on hydrochlorothiazide 25 mg for hypertension and she believes causing her leg cramps.  She stopped taking a thiazide several days ago.  Cramps occur 1-2 times per week.  No extra symptoms.  Patient denies any chest pain, shortness of breath, palpitations, headache, dizziness, numbness or tingling.  Patient reports symptoms are better since she stopped hydrochlorothiazide.  Her cramps could be related to electrolytes imbalance in the setting of diuretic use like low potassium.  Advised to stop thiazide.  Continue propranolol 60 mg.  Started patient on Norvasc 5  mg daily.  Will obtain BMP.  Patient said that she is going to return to the lab for blood draw.    2. Benign essential hypertension  Started Norvasc 5 mg .  Return to the " clinic for blood pressure check  - amLODIPine (NORVASC) 5 MG tablet; Take 1 tablet (5 mg) by mouth daily  Dispense: 30 tablet; Refill: 1  - Basic metabolic panel  (Ca, Cl, CO2, Creat, Gluc, K, Na, BUN); Future     Patient verbalized understanding and agreed on the plan of care.  All questions answered.      No follow-ups on file.    Bijan Atkinson MD  [facility]

## 2019-10-21 ENCOUNTER — OFFICE VISIT (OUTPATIENT)
Dept: FAMILY MEDICINE | Facility: CLINIC | Age: 74
End: 2019-10-21
Payer: COMMERCIAL

## 2019-10-21 VITALS
SYSTOLIC BLOOD PRESSURE: 140 MMHG | WEIGHT: 112 LBS | HEIGHT: 59 IN | HEART RATE: 71 BPM | TEMPERATURE: 98.2 F | OXYGEN SATURATION: 98 % | BODY MASS INDEX: 22.58 KG/M2 | DIASTOLIC BLOOD PRESSURE: 85 MMHG

## 2019-10-21 DIAGNOSIS — I10 BENIGN ESSENTIAL HYPERTENSION: Primary | ICD-10-CM

## 2019-10-21 DIAGNOSIS — G47.62 NOCTURNAL LEG CRAMPS: ICD-10-CM

## 2019-10-21 PROCEDURE — 99214 OFFICE O/P EST MOD 30 MIN: CPT | Performed by: FAMILY MEDICINE

## 2019-10-21 RX ORDER — AMLODIPINE BESYLATE 5 MG/1
5 TABLET ORAL DAILY
Qty: 30 TABLET | Refills: 1 | Status: SHIPPED | OUTPATIENT
Start: 2019-10-21 | End: 2020-01-16

## 2019-10-21 ASSESSMENT — ENCOUNTER SYMPTOMS
APPETITE CHANGE: 0
ACTIVITY CHANGE: 0
HEMATOLOGIC/LYMPHATIC NEGATIVE: 1
CHILLS: 0
MUSCULOSKELETAL NEGATIVE: 1
CARDIOVASCULAR NEGATIVE: 1
DIAPHORESIS: 0
GASTROINTESTINAL NEGATIVE: 1
NEUROLOGICAL NEGATIVE: 1
UNEXPECTED WEIGHT CHANGE: 0
FATIGUE: 0
RESPIRATORY NEGATIVE: 1
ALLERGIC/IMMUNOLOGIC NEGATIVE: 1
FEVER: 0
ENDOCRINE NEGATIVE: 1
PSYCHIATRIC NEGATIVE: 1

## 2019-10-21 ASSESSMENT — MIFFLIN-ST. JEOR: SCORE: 923.66

## 2019-10-23 ENCOUNTER — TELEPHONE (OUTPATIENT)
Dept: FAMILY MEDICINE | Facility: CLINIC | Age: 74
End: 2019-10-23

## 2019-10-23 DIAGNOSIS — I10 HYPERTENSION GOAL BP (BLOOD PRESSURE) < 140/90: ICD-10-CM

## 2019-10-23 RX ORDER — HYDROCHLOROTHIAZIDE 25 MG/1
TABLET ORAL
Qty: 90 TABLET | Refills: 0 | OUTPATIENT
Start: 2019-10-23

## 2019-10-23 NOTE — TELEPHONE ENCOUNTER
Refill request received within 30 days of last office visit with pcp.  Prescription is routed to the provider to please address refill.    Huma FUN, RN

## 2019-10-23 NOTE — TELEPHONE ENCOUNTER
Patient reports notcurnal cramps could be related to hydrochlorothiazide side effects like low K level. Switched hydrochlorothiazide to Norvasc,

## 2019-10-24 RX ORDER — HYDROCHLOROTHIAZIDE 25 MG/1
25 TABLET ORAL DAILY
Qty: 90 TABLET | Refills: 3 | OUTPATIENT
Start: 2019-10-24

## 2019-11-08 ENCOUNTER — ALLIED HEALTH/NURSE VISIT (OUTPATIENT)
Dept: NURSING | Facility: CLINIC | Age: 74
End: 2019-11-08
Payer: COMMERCIAL

## 2019-11-08 VITALS — SYSTOLIC BLOOD PRESSURE: 132 MMHG | OXYGEN SATURATION: 99 % | DIASTOLIC BLOOD PRESSURE: 78 MMHG | HEART RATE: 66 BPM

## 2019-11-08 DIAGNOSIS — I10 BENIGN ESSENTIAL HYPERTENSION: ICD-10-CM

## 2019-11-08 DIAGNOSIS — I10 HYPERTENSION GOAL BP (BLOOD PRESSURE) < 140/90: Primary | ICD-10-CM

## 2019-11-08 LAB
ANION GAP SERPL CALCULATED.3IONS-SCNC: 3 MMOL/L (ref 3–14)
BUN SERPL-MCNC: 12 MG/DL (ref 7–30)
CALCIUM SERPL-MCNC: 9.2 MG/DL (ref 8.5–10.1)
CHLORIDE SERPL-SCNC: 105 MMOL/L (ref 94–109)
CO2 SERPL-SCNC: 30 MMOL/L (ref 20–32)
CREAT SERPL-MCNC: 0.84 MG/DL (ref 0.52–1.04)
GFR SERPL CREATININE-BSD FRML MDRD: 69 ML/MIN/{1.73_M2}
GLUCOSE SERPL-MCNC: 96 MG/DL (ref 70–99)
POTASSIUM SERPL-SCNC: 4.8 MMOL/L (ref 3.4–5.3)
SODIUM SERPL-SCNC: 138 MMOL/L (ref 133–144)

## 2019-11-08 PROCEDURE — 80048 BASIC METABOLIC PNL TOTAL CA: CPT | Performed by: FAMILY MEDICINE

## 2019-11-08 PROCEDURE — 99207 ZZC NO CHARGE NURSE ONLY: CPT

## 2019-11-08 PROCEDURE — 36415 COLL VENOUS BLD VENIPUNCTURE: CPT | Performed by: FAMILY MEDICINE

## 2019-11-08 NOTE — PROGRESS NOTES
Mami Dumont is a 74 year old patient who comes in today for a Blood Pressure check.  Initial BP:  /78   Pulse 66   SpO2 99%      66  Disposition: results routed to provider.    BP Readings from Last 3 Encounters:   11/08/19 132/78   10/21/19 (!) 140/85   06/28/18 126/65     Rosa Maria Villa MA

## 2019-11-25 ENCOUNTER — TELEPHONE (OUTPATIENT)
Dept: FAMILY MEDICINE | Facility: CLINIC | Age: 74
End: 2019-11-25

## 2019-11-25 NOTE — TELEPHONE ENCOUNTER
Panel Management Review      Patient has the following on her problem list:     Hypertension   Last three blood pressure readings:  BP Readings from Last 3 Encounters:   11/08/19 132/78   10/21/19 (!) 140/85   06/28/18 126/65     Blood pressure: FAILED    HTN Guidelines:  Less than 140/90      Composite cancer screening  Chart review shows that this patient is due/due soon for the following none.  Summary:    Patient is due/failing the following:   PHYSICAL    Action needed:   Patient needs office visit for physical.    Type of outreach:    Sent letter.    Questions for provider review:    None                                                                                                                                    Megha Miranda [facility]       Chart routed to sent letter .

## 2019-11-25 NOTE — LETTER
Mami Dumont  [address]   [place] [address]          November 25, 2019          Dear Mami Dumont      Our records indicate that you have not scheduled for a(n)Annual physical exam  which was recommended by your health care team. Monitoring and managing your preventative and chronic health conditions are very important to us.       If you have received your health care elsewhere, please provide us with that information so it can be documented in your chart.    Please call [phone] or message us through your [employer] account to schedule an appointment or provide information for your chart.     We look forward to seeing you and working with you on your health care needs.     Sincerely,   Dr. Atkinson/og          *If you have already scheduled an appointment, please disregard this reminder

## 2019-12-03 ENCOUNTER — TELEPHONE (OUTPATIENT)
Dept: FAMILY MEDICINE | Facility: CLINIC | Age: 74
End: 2019-12-03

## 2019-12-03 NOTE — LETTER
December 4, 2019    Mami Dumont  [address]   [place] [address]        Dear Mami Dumont      Our records indicate that you have not scheduled for a(n)Mammogram which was recommended by your health care team. Monitoring and managing your preventative and chronic health conditions are very important to us.       If you have received your health care elsewhere, please provide us with that information so it can be documented in your chart.    Please call [phone] or message us through your [employer] account to schedule an appointment or provide information for your chart.     We look forward to seeing you and working with you on your health care needs.     Sincerely,   Nando Broussard MD/aliyah        *If you have already scheduled an appointment, please disregard this reminder

## 2019-12-04 NOTE — TELEPHONE ENCOUNTER
Letter mailed.  Elaine Cantrell,     Routing back to . Patient comm. Pref. Is email.    Routing comment

## 2019-12-27 NOTE — ADDENDUM NOTE
Addended by: FELI SANFORD on: 6/28/2018 06:29 PM     Modules accepted: Orders     1.85 1.85 1.85 1.85 1.85

## 2019-12-30 ENCOUNTER — TELEPHONE (OUTPATIENT)
Dept: FAMILY MEDICINE | Facility: CLINIC | Age: 74
End: 2019-12-30

## 2019-12-30 NOTE — TELEPHONE ENCOUNTER
:;  Please re-mail the 11/8/19 bmp lab results to patient.  She states did not receive.  I did confirm is correct address.  She is aware the results of her bmp were normal.  Ruma Marie RN

## 2019-12-30 NOTE — TELEPHONE ENCOUNTER
Patient is calling stating that she never got her results from her labs.  She would like someone to call her regarding them and needs them mailed out.  Please call to advise.  Thank you

## 2020-01-09 NOTE — TELEPHONE ENCOUNTER
Patient is calling and states she still has not received her lab results in the mail. Please mail them

## 2020-01-09 NOTE — TELEPHONE ENCOUNTER
BMP lab results dated 11/08/2019 mailed to Patient's home address of:    [address] [place] [address].  Phuong EVANS

## 2020-01-16 DIAGNOSIS — I10 BENIGN ESSENTIAL HYPERTENSION: ICD-10-CM

## 2020-01-16 RX ORDER — AMLODIPINE BESYLATE 5 MG/1
5 TABLET ORAL DAILY
Qty: 30 TABLET | Refills: 0 | Status: SHIPPED | OUTPATIENT
Start: 2020-01-16 | End: 2020-03-02

## 2020-01-16 NOTE — TELEPHONE ENCOUNTER
Routing refill request to provider for review/approval because:  Anat given x1 and patient did not follow up, please advise.  Patient was instructed to follow up in January for BP patient doesn't have an appointment pending.     No appointment pending at this time.  Routing to provider to advise.    Gini Del Rio BSN, RN

## 2020-02-21 ENCOUNTER — TELEPHONE (OUTPATIENT)
Dept: FAMILY MEDICINE | Facility: CLINIC | Age: 75
End: 2020-02-21

## 2020-02-21 NOTE — LETTER
Mami Dumont  [address]   [place] [address]          February 21, 2020          Dear Mami Dumont      Our records indicate that you have not scheduled for a(n)Blood pressure check , Mammogram and Annual physical exam  which was recommended by your health care team. Monitoring and managing your preventative and chronic health conditions are very important to us.       If you have received your health care elsewhere, please provide us with that information so it can be documented in your chart.    Please call [phone] or message us through your [employer] account to schedule an appointment or provide information for your chart.     We look forward to seeing you and working with you on your health care needs.     Sincerely,   Dr. Atkinson/og          *If you have already scheduled an appointment, please disregard this reminder

## 2020-02-21 NOTE — TELEPHONE ENCOUNTER
Patient Quality Outreach      Summary:    Patient is due/failing the following:   Hypertension follow-up visit, Breast Cancer Screening - Mammogram and Annual wellness, date due: 4/24/2014    Type of outreach:    Sent letter.    Questions for provider review:    None                                                                                   **Start Working phrase here:**       Patient has the following on her problem list/HM:     Hypertension   Last three blood pressure readings:  BP Readings from Last 3 Encounters:   11/08/19 132/78   10/21/19 (!) 140/85   06/28/18 126/65     Blood pressure: Failed    HTN Guidelines:  ? 139/89                                                 mlk     Chart routed to sent letter.

## 2020-03-02 DIAGNOSIS — I10 BENIGN ESSENTIAL HYPERTENSION: ICD-10-CM

## 2020-03-02 RX ORDER — AMLODIPINE BESYLATE 5 MG/1
5 TABLET ORAL DAILY
Qty: 30 TABLET | Refills: 0 | Status: SHIPPED | OUTPATIENT
Start: 2020-03-02 | End: 2020-04-02

## 2020-03-02 NOTE — TELEPHONE ENCOUNTER
Routing refill request to provider for review/approval because:  Anat given x 2 and patient did not follow up, please advise.  Patient was instructed to follow up in January for BP patient doesn't have an appointment pending.      No appointment pending at this time.  Routing to provider to advise.     Gini Del Rio BSN, RN

## 2020-04-02 DIAGNOSIS — I10 BENIGN ESSENTIAL HYPERTENSION: ICD-10-CM

## 2020-04-02 RX ORDER — AMLODIPINE BESYLATE 5 MG/1
5 TABLET ORAL DAILY
Qty: 30 TABLET | Refills: 0 | Status: SHIPPED | OUTPATIENT
Start: 2020-04-02 | End: 2020-05-11

## 2020-05-10 DIAGNOSIS — I10 BENIGN ESSENTIAL HYPERTENSION: ICD-10-CM

## 2020-05-11 RX ORDER — AMLODIPINE BESYLATE 5 MG/1
5 TABLET ORAL DAILY
Qty: 30 TABLET | Refills: 0 | Status: SHIPPED | OUTPATIENT
Start: 2020-05-11 | End: 2020-06-17

## 2020-05-11 NOTE — TELEPHONE ENCOUNTER
Routing refill request to provider for review/approval because:  Anat given x 3 and patient did not follow up, please advise.  Patient was instructed to follow up in January for BP patient doesn't have an appointment pending.      No appointment pending at this time.  Routing to provider to advise.

## 2020-07-09 ENCOUNTER — ANCILLARY PROCEDURE (OUTPATIENT)
Dept: GENERAL RADIOLOGY | Facility: CLINIC | Age: 75
End: 2020-07-09
Attending: FAMILY MEDICINE
Payer: COMMERCIAL

## 2020-07-09 ENCOUNTER — OFFICE VISIT (OUTPATIENT)
Dept: FAMILY MEDICINE | Facility: CLINIC | Age: 75
End: 2020-07-09
Payer: COMMERCIAL

## 2020-07-09 VITALS
HEIGHT: 59 IN | BODY MASS INDEX: 22.78 KG/M2 | SYSTOLIC BLOOD PRESSURE: 138 MMHG | DIASTOLIC BLOOD PRESSURE: 64 MMHG | HEART RATE: 71 BPM | TEMPERATURE: 98.3 F | WEIGHT: 113 LBS | RESPIRATION RATE: 16 BRPM | OXYGEN SATURATION: 97 %

## 2020-07-09 DIAGNOSIS — M54.42 MIDLINE LOW BACK PAIN WITH LEFT-SIDED SCIATICA, UNSPECIFIED CHRONICITY: Primary | ICD-10-CM

## 2020-07-09 DIAGNOSIS — M79.652 PAIN OF LEFT THIGH: ICD-10-CM

## 2020-07-09 PROCEDURE — 73502 X-RAY EXAM HIP UNI 2-3 VIEWS: CPT

## 2020-07-09 PROCEDURE — 72100 X-RAY EXAM L-S SPINE 2/3 VWS: CPT

## 2020-07-09 PROCEDURE — 99213 OFFICE O/P EST LOW 20 MIN: CPT | Performed by: FAMILY MEDICINE

## 2020-07-09 RX ORDER — METHOCARBAMOL 500 MG/1
500 TABLET, FILM COATED ORAL 4 TIMES DAILY PRN
Qty: 30 TABLET | Refills: 0 | Status: SHIPPED | OUTPATIENT
Start: 2020-07-09

## 2020-07-09 RX ORDER — MELOXICAM 15 MG/1
15 TABLET ORAL DAILY
Qty: 30 TABLET | Refills: 0 | Status: SHIPPED | OUTPATIENT
Start: 2020-07-09 | End: 2020-12-15

## 2020-07-09 ASSESSMENT — MIFFLIN-ST. JEOR: SCORE: 923.19

## 2020-07-09 NOTE — LETTER
July 13, 2020      Mami Dumont  [address]   [place] [address]        Dear ,    We are writing to inform you of your test results.      If you have any questions or concerns, please call the clinic at the number listed above.       Sincerely,        Bijan Atkinson MD/[facility]          Resulted Orders   XR Hip Left 2-3 Views    Narrative    XR LEFT HIP TWO-THREE VIEWS   7/9/2020 3:28 PM     HISTORY:  Pain of left thigh.      Impression    IMPRESSION: The bones, joint spaces and alignment appear normal except  for mild osteoarthrosis in the left SI joint. No hip pathology  evident. There is a small amount of nonspecific soft tissue  calcification lateral to the proximal shaft of the femur. This could  be related to old trauma/myositis ossificans. However, it is  nonspecific. If there are symptoms referable to this area, MRI could  better evaluate.    PAOLA SO MD   XR Lumbar Spine 2/3 Views    Narrative    LUMBAR SPINE TWO TO THREE VIEWS 7/9/2020 3:20 PM     COMPARISON: None    HISTORY: Midline low back pain with left-sided sciatica, unspecified  chronicity.      Impression    IMPRESSION: There is moderate right convex curvature of the lumbar  spine centered at the L3 level. There is moderate right lateral  listhesis of L3 with respect to both L2 and L4. There is grade 1  degenerative anterolisthesis of L4 upon L5. There is mild degenerative  retrolisthesis of L2 upon L3. The visualized bones are osteopenic. No  definite recent fractures noted. There is degenerative endplate  spurring and loss of intervertebral disc space height at the T12-L1,  L1-L2 and L5-S1 levels. There is facet arthropathy at the L5-S1 level.    DAVY SAMAYOA MD

## 2020-07-09 NOTE — LETTER
July 13, 2020      Mami A Julia  [address]   [place] [address]        Dear ,    We are writing to inform you of your test results.    If you have any questions or concerns, please call the clinic at the number listed above.       Sincerely,        Bijan Atkinson MD/[facility]      Resulted Orders   XR Hip Left 2-3 Views    Narrative    XR LEFT HIP TWO-THREE VIEWS   7/9/2020 3:28 PM     HISTORY:  Pain of left thigh.      Impression    IMPRESSION: The bones, joint spaces and alignment appear normal except  for mild osteoarthrosis in the left SI joint. No hip pathology  evident. There is a small amount of nonspecific soft tissue  calcification lateral to the proximal shaft of the femur. This could  be related to old trauma/myositis ossificans. However, it is  nonspecific. If there are symptoms referable to this area, MRI could  better evaluate.    PAOLA SO MD

## 2020-07-09 NOTE — PROGRESS NOTES
Subjective     Mami Dumont is a 75 year old female who presents to clinic today for the following health issues:    HPI   Musculoskeletal problem/pain      Duration: 3 weeks    Description  Location: left groin area    Intensity:  moderate    Accompanying signs and symptoms: radiation of pain to leg    History  Previous similar problem: no   Previous evaluation:  none    Precipitating or alleviating factors:  Trauma or overuse: no   Aggravating factors include: walking    Therapies tried and outcome: heat, ice, stretching, exercises, Ibuprofen and muscle relaxers  Patient is here for concern of left groin and thigh pain  Started 3 week sago. No recent trauma , no Injury ,  She thinks she has bad matrix causing back and thigh pain   She has hard times walking   No stool or urine incontinence   No saddle shape anaathesia   No weakness or numbness in the LE         Patient Active Problem List   Diagnosis     Allergic rhinitis due to animal dander     Seasonal allergic rhinitis     House dust mite allergy     Rhinitis, allergic to other allergen     Seasonal allergic conjunctivitis     Diagnostic skin and sensitization tests     Advanced directives, counseling/discussion     CARDIOVASCULAR SCREENING; LDL GOAL LESS THAN 160     Hyperlipidemia LDL goal <160     Heme positive stool     Dermatochalasis, ou     Brow ptosis, ou     Tubular adenoma of colon     Hypertension goal BP (blood pressure) < 140/90     Essential tremor     Glaucoma suspect of both eyes     Coronary artery disease due to calcified coronary lesion     Compression fracture of L1 lumbar vertebra, closed, initial encounter (H)     Family history of premature CAD     Internal hemorrhoids     Past Surgical History:   Procedure Laterality Date     BLEPHAROPLASTY, BROW LIFT BILATERAL, COMBINED  5/2/13    with snip punctoplasty,[place]     BLEPHAROPLASTY, BROW LIFT BILATERAL, COMBINED Bilateral 5/19/2017    Procedure: COMBINED BLEPHAROPLASTY, BROW LIFT  BILATERAL;  BILATERAL UPPER LID BLEPHAROPLASTY WITH BROW PTOSIS ;  Surgeon: Faisal Alicea MD;  Location: [facility]     ENDOSCOPIC POLYPECTOMY NASAL  1960    nasal polyp removal       Social History     Tobacco Use     Smoking status: Never Smoker     Smokeless tobacco: Never Used     Tobacco comment: Lives in smoke free household   Substance Use Topics     Alcohol use: Yes     Comment: occassional     Family History   Problem Relation Age of Onset     Cancer Mother      Diabetes Mother      Hypertension Mother      Cerebrovascular Disease Mother      Glaucoma Mother 70        one eye-sudden  loss of vision upon waking     Hypertension Brother      Hypertension Brother      Glaucoma Brother      Thyroid Disease No family hx of      Macular Degeneration No family hx of          Current Outpatient Medications   Medication Sig Dispense Refill     amLODIPine (NORVASC) 5 MG tablet Take 1 tablet (5 mg) by mouth daily 90 tablet 0     aspirin 81 MG EC tablet Take 1 tablet (81 mg) by mouth daily 90 tablet 3     FLONASE 50 MCG/ACT nasal spray Spray 2 sprays in nostril daily Brand necessary. Patient gets headache with generic. 16 g 3     Hypertonic Nasal Wash (SINUS RINSE BOTTLE KIT NA) Spray 1 Bottle in nostril as needed.       meloxicam (MOBIC) 15 MG tablet Take 1 tablet (15 mg) by mouth daily 30 tablet 0     methocarbamol (ROBAXIN) 500 MG tablet Take 1 tablet (500 mg) by mouth 4 times daily as needed for muscle spasms 30 tablet 0     Multiple Vitamins-Minerals (CENTRUM PO) Take  by mouth. Takes 1 daily       naproxen (NAPROSYN) 500 MG tablet Take 500 mg by mouth 2 times daily (with meals).       propranolol HCl 60 MG TABS Take 1 tablet by mouth 20 mg as needed for tremors       STATIN NOT PRESCRIBED, INTENTIONAL, 1 each continuous prn. Statin not prescribed intentionally due to Refusal by patient 0 each 0     VITAMIN E-200 PO Take  by mouth. Takes 1 daily       loratadine-pseudoePHEDrine (CLARITIN-D 12 HOUR) 5-120 MG per  "tablet Take 1 tablet by mouth as needed Taking less than prior due to hypertension.       Allergies   Allergen Reactions     Penicillins Shortness Of Breath and Hives     Ace Inhibitors      Head tremor     Recent Labs   Lab Test 11/08/19  1007 05/05/17  1111 10/07/15  0900  11/21/13  1312 04/17/13  0757   LDL  --   --  157*  --   --  164*   HDL  --   --  63  --   --  59   TRIG  --   --  143  --   --  105   ALT  --   --  29  --   --   --    CR 0.84 0.87 0.92   < > 0.80  --    GFRESTIMATED 69 64 61   < > 72  --    GFRESTBLACK 80 77 73   < > 87  --    POTASSIUM 4.8 3.4 3.7   < > 4.8  --    TSH  --   --   --   --  1.35  --     < > = values in this interval not displayed.      BP Readings from Last 3 Encounters:   07/09/20 138/64   11/08/19 132/78   10/21/19 (!) 140/85    Wt Readings from Last 3 Encounters:   07/09/20 51.3 kg (113 lb)   10/21/19 50.8 kg (112 lb)   06/28/18 51.3 kg (113 lb)                    Reviewed and updated as needed this visit by Provider  Tobacco  Allergies  Meds  Problems  Med Hx  Surg Hx  Fam Hx         Review of Systems   Constitutional, HEENT, cardiovascular, pulmonary, gi and gu systems are negative, except as otherwise noted.      Objective    /64   Pulse 71   Temp 98.3  F (36.8  C) (Tympanic)   Resp 16   Ht 1.499 m (4' 11\")   Wt 51.3 kg (113 lb)   SpO2 97%   BMI 22.82 kg/m    Body mass index is 22.82 kg/m .  Physical Exam  Vitals signs and nursing note reviewed.   Constitutional:       General: She is not in acute distress.     Appearance: Normal appearance. She is not ill-appearing, toxic-appearing or diaphoretic.   Neck:      Musculoskeletal: Normal range of motion and neck supple. No neck rigidity or muscular tenderness.   Musculoskeletal:      Lumbar back: She exhibits normal range of motion, no tenderness, no bony tenderness, no swelling, no edema, no deformity, no laceration, no pain, no spasm and normal pulse.        Legs:    Lymphadenopathy:      Cervical: No " cervical adenopathy.   Neurological:      Mental Status: She is alert.                    Assessment & Plan     1. Midline low back pain with left-sided sciatica, unspecified chronicity    - XR Lumbar Spine 2/3 Views  - meloxicam (MOBIC) 15 MG tablet; Take 1 tablet (15 mg) by mouth daily  Dispense: 30 tablet; Refill: 0  - methocarbamol (ROBAXIN) 500 MG tablet; Take 1 tablet (500 mg) by mouth 4 times daily as needed for muscle spasms  Dispense: 30 tablet; Refill: 0    2. Pain of left thigh    - XR Hip Left 2-3 Views  - meloxicam (MOBIC) 15 MG tablet; Take 1 tablet (15 mg) by mouth daily  Dispense: 30 tablet; Refill: 0  - methocarbamol (ROBAXIN) 500 MG tablet; Take 1 tablet (500 mg) by mouth 4 times daily as needed for muscle spasms  Dispense: 30 tablet; Refill: 0  - PHYSICAL THERAPY REFERRAL; Future           Return if symptoms worsen or fail to improve.    Bijan Atkinson MD  [facility]

## 2020-07-10 ENCOUNTER — TELEPHONE (OUTPATIENT)
Dept: FAMILY MEDICINE | Facility: CLINIC | Age: 75
End: 2020-07-10

## 2020-07-21 ENCOUNTER — THERAPY VISIT (OUTPATIENT)
Dept: PHYSICAL THERAPY | Facility: CLINIC | Age: 75
End: 2020-07-21
Attending: FAMILY MEDICINE
Payer: COMMERCIAL

## 2020-07-21 DIAGNOSIS — M54.42 BILATERAL LOW BACK PAIN WITH LEFT-SIDED SCIATICA: ICD-10-CM

## 2020-07-21 DIAGNOSIS — M79.652 PAIN OF LEFT THIGH: ICD-10-CM

## 2020-07-21 PROCEDURE — 97110 THERAPEUTIC EXERCISES: CPT | Mod: GP | Performed by: PHYSICAL THERAPIST

## 2020-07-21 PROCEDURE — 97112 NEUROMUSCULAR REEDUCATION: CPT | Mod: GP | Performed by: PHYSICAL THERAPIST

## 2020-07-21 PROCEDURE — 97161 PT EVAL LOW COMPLEX 20 MIN: CPT | Mod: GP | Performed by: PHYSICAL THERAPIST
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
## 2020-07-30 ENCOUNTER — THERAPY VISIT (OUTPATIENT)
Dept: PHYSICAL THERAPY | Facility: CLINIC | Age: 75
End: 2020-07-30
Payer: COMMERCIAL

## 2020-07-30 DIAGNOSIS — M54.42 BILATERAL LOW BACK PAIN WITH LEFT-SIDED SCIATICA: ICD-10-CM

## 2020-07-30 PROCEDURE — 97112 NEUROMUSCULAR REEDUCATION: CPT | Mod: GP | Performed by: PHYSICAL THERAPIST

## 2020-07-30 PROCEDURE — 97110 THERAPEUTIC EXERCISES: CPT | Mod: GP | Performed by: PHYSICAL THERAPIST

## 2020-07-30 NOTE — PROGRESS NOTES
Subjective:  HPI  Physical Exam                    Objective:  System    Physical Exam    General     ROS    Assessment/Plan:    SUBJECTIVE  Subjective changes as noted by pt:  Mireya reports that she is performing the prone on elbows 3x/day. As a result, feels a little bit better. The L leg is not bothering quite as much. Not as many shocks down her leg.         Current pain level: 5/10     Changes in function:  Yes (See Goal flowsheet attached for changes in current functional level)     Adverse reaction to treatment or activity:  None    OBJECTIVE  Changes in objective findings:  Yes, See physical exam section and/or daily flowsheet for response to repeated movements.           ASSESSMENT  Mami continues to require intervention to meet STG and LTG's: PT  Patient's symptoms are resolving.  Patient is progressing as expected.  Response to therapy has shown an improvement in  pain level and ROM   Progress made towards STG/LTG?  Yes (See Goal flowsheet attached for updates on achievement of STG and LTG)    PLAN  Continue current treatment plan until patient demonstrates readiness to progress to higher level exercises.    PTA/ATC plan:  N/A    Please refer to the daily flowsheet for treatment today, total treatment time and time spent performing 1:1 timed codes.

## 2020-08-06 ENCOUNTER — THERAPY VISIT (OUTPATIENT)
Dept: PHYSICAL THERAPY | Facility: CLINIC | Age: 75
End: 2020-08-06
Payer: COMMERCIAL

## 2020-08-06 DIAGNOSIS — G89.29 CHRONIC BILATERAL LOW BACK PAIN WITH LEFT-SIDED SCIATICA: ICD-10-CM

## 2020-08-06 DIAGNOSIS — M54.42 CHRONIC BILATERAL LOW BACK PAIN WITH LEFT-SIDED SCIATICA: ICD-10-CM

## 2020-08-06 PROCEDURE — 97110 THERAPEUTIC EXERCISES: CPT | Mod: GP | Performed by: PHYSICAL THERAPIST

## 2020-08-13 ENCOUNTER — THERAPY VISIT (OUTPATIENT)
Dept: PHYSICAL THERAPY | Facility: CLINIC | Age: 75
End: 2020-08-13
Payer: COMMERCIAL

## 2020-08-13 DIAGNOSIS — G89.29 CHRONIC BILATERAL LOW BACK PAIN WITH LEFT-SIDED SCIATICA: ICD-10-CM

## 2020-08-13 DIAGNOSIS — M54.42 CHRONIC BILATERAL LOW BACK PAIN WITH LEFT-SIDED SCIATICA: ICD-10-CM

## 2020-08-13 PROCEDURE — 97140 MANUAL THERAPY 1/> REGIONS: CPT | Mod: GP | Performed by: PHYSICAL THERAPIST

## 2020-08-13 PROCEDURE — 97110 THERAPEUTIC EXERCISES: CPT | Mod: GP | Performed by: PHYSICAL THERAPIST

## 2020-08-13 PROCEDURE — 97112 NEUROMUSCULAR REEDUCATION: CPT | Mod: GP | Performed by: PHYSICAL THERAPIST

## 2020-08-13 NOTE — PROGRESS NOTES
SUBJECTIVE  Subjective changes as noted by pt: Patient reports that things are overall much better but the last 2 days have not been too good. The end of the day feels the most sore. This morning was the best she has felt. Patient reports she hasn't been doing the standing extension because it is sore, but it does feel like it is helping the last few days. Current pain is in her left buttock and groin.   Current pain level: 4/10   Changes in function:  None     Adverse reaction to treatment or activity:  None    OBJECTIVE  Changes in objective findings: Lumbar AROM flexion hands to ankles, ext min loss.      ASSESSMENT  Mami continues to require intervention to meet STG and LTG's: PT  Patient is not progressing as expected.  Response to therapy has shown an improvement in  ROM   Response to therapy has shown lack of progress in  pain level and radicular symptoms  Progress made towards STG/LTG?  None    PLAN  Current treatment program is being advanced to more complex exercises.    PTA/ATC plan:  N/A    Please refer to the daily flowsheet for treatment today, total treatment time and time spent performing 1:1 timed codes.

## 2020-08-24 ENCOUNTER — THERAPY VISIT (OUTPATIENT)
Dept: PHYSICAL THERAPY | Facility: CLINIC | Age: 75
End: 2020-08-24
Payer: COMMERCIAL

## 2020-08-24 DIAGNOSIS — M54.42 CHRONIC BILATERAL LOW BACK PAIN WITH LEFT-SIDED SCIATICA: ICD-10-CM

## 2020-08-24 DIAGNOSIS — G89.29 CHRONIC BILATERAL LOW BACK PAIN WITH LEFT-SIDED SCIATICA: ICD-10-CM

## 2020-08-24 PROCEDURE — 97110 THERAPEUTIC EXERCISES: CPT | Mod: GP | Performed by: PHYSICAL THERAPIST

## 2020-08-24 PROCEDURE — 97140 MANUAL THERAPY 1/> REGIONS: CPT | Mod: GP | Performed by: PHYSICAL THERAPIST

## 2020-08-24 NOTE — PROGRESS NOTES
SUBJECTIVE  Subjective changes as noted by pt: Patient reports she is feeling a lot better. The leg is getting better - she is able to walk more and pain resolves quickly if she sits or stands to rest for a minute. Currently has a mild pain in the left anterior/lateral thigh but nowhere else. Doing all exercises 2-3 times per day.    Current pain level: 3/10   Changes in function:  Yes (See Goal flowsheet attached for changes in current functional level)     Adverse reaction to treatment  Lumbar AROM flexion to toes with mild end range pain low back and L thigh, ext min loss with ERP low back only; SG L min loss, R mod loss with ERP L side low back only. Hypomobile L inominate.     ASSESSMENT  Mami continues to require intervention to meet STG and LTG's: PT  Patient is progressing as expected.  Response to therapy has shown an improvement in  pain level, ROM  and function  Progress made towards STG/LTG?  Yes (See Goal flowsheet attached for updates on achievement of STG and LTG)    PLAN  Current treatment program is being advanced to more complex exercises.    PTA/ATC plan:  N/A    Please refer to the daily flowsheet for treatment today, total treatment time and time spent performing 1:1 timed codes.

## 2020-09-03 ENCOUNTER — THERAPY VISIT (OUTPATIENT)
Dept: PHYSICAL THERAPY | Facility: CLINIC | Age: 75
End: 2020-09-03
Payer: COMMERCIAL

## 2020-09-03 DIAGNOSIS — G89.29 CHRONIC BILATERAL LOW BACK PAIN WITH LEFT-SIDED SCIATICA: ICD-10-CM

## 2020-09-03 DIAGNOSIS — M54.42 CHRONIC BILATERAL LOW BACK PAIN WITH LEFT-SIDED SCIATICA: ICD-10-CM

## 2020-09-03 PROCEDURE — 97530 THERAPEUTIC ACTIVITIES: CPT | Mod: GP | Performed by: PHYSICAL THERAPIST

## 2020-09-03 PROCEDURE — 97110 THERAPEUTIC EXERCISES: CPT | Mod: GP | Performed by: PHYSICAL THERAPIST

## 2020-09-03 PROCEDURE — 97140 MANUAL THERAPY 1/> REGIONS: CPT | Mod: GP | Performed by: PHYSICAL THERAPIST

## 2020-09-03 NOTE — PROGRESS NOTES
PROGRESS  REPORT    Progress reporting period is from 7/21/20 to 9/3/20.       SUBJECTIVE  Subjective changes noted by patient: Overall improvement, experiencing an exacerbation of symptoms. Patient reports her lower back has been really sore this morning - this continues to be intermittent, 2-3 instances of this since last visit. She thinks it may be related to sitting on patio furniture for a long time yesterday. She has noted some of the thigh pain but not as bad this week - this is still improving despite increased back pain.     Current pain level is 6/10.     Previous pain level was 8/10.   Changes in function:  Yes, recent exacerbation has limited progress  Adverse reaction to treatment or activity: None    OBJECTIVE  Changes noted in objective findings: Lumbar AROM flexion hands to floor, ext mod loss, SG mod loss bilat - all with ERP except flexion mid range pain.     ASSESSMENT/PLAN  Updated problem list and treatment plan: Diagnosis 1:  Low Back Pain  Pain -  manual therapy, self management, education, directional preference exercise and home program  Decreased ROM/flexibility - manual therapy and therapeutic exercise  Decreased strength - therapeutic exercise and therapeutic activities  Impaired posture - neuro re-education  STG/LTGs have been met or progress has been made towards goals:  Yes (See Goal flow sheet completed today.)  Assessment of Progress: The patient's condition is improving.  The patient's condition has potential to improve.  Self Management Plans:  Patient has been instructed in a home treatment program.  I have re-evaluated this patient and find that the nature, scope, duration and intensity of the therapy is appropriate for the medical condition of the patient.  Mami continues to require the following intervention to meet STG and LTG's:  PT    Recommendations:  This patient would benefit from continued therapy.     Frequency:  1 X week, once daily  Duration:  for 4  weeks        Please refer to the daily flowsheet for treatment today, total treatment time and time spent performing 1:1 timed codes.

## 2020-09-22 ENCOUNTER — THERAPY VISIT (OUTPATIENT)
Dept: PHYSICAL THERAPY | Facility: CLINIC | Age: 75
End: 2020-09-22
Payer: COMMERCIAL

## 2020-09-22 DIAGNOSIS — M54.42 CHRONIC BILATERAL LOW BACK PAIN WITH LEFT-SIDED SCIATICA: ICD-10-CM

## 2020-09-22 DIAGNOSIS — G89.29 CHRONIC BILATERAL LOW BACK PAIN WITH LEFT-SIDED SCIATICA: ICD-10-CM

## 2020-09-22 PROCEDURE — 97110 THERAPEUTIC EXERCISES: CPT | Mod: GP | Performed by: PHYSICAL THERAPIST

## 2020-09-22 PROCEDURE — 97112 NEUROMUSCULAR REEDUCATION: CPT | Mod: GP | Performed by: PHYSICAL THERAPIST

## 2020-09-22 NOTE — PROGRESS NOTES
SUBJECTIVE  Subjective changes as noted by pt: Patient returns after a 2.5 week hiatus from PT. She reports she is feeling a lot better today. She reports she was feeling more sore last week. She went to the chiropractor yesterday and feels that was very helpful. She will be seeing the chiro 2 X week for a while. Exercises are going well, there are some that bother her. Still having some tingling in the left anterior thigh.    Current pain level: 1/10   Changes in function:  None     Adverse reaction to treatment or activity:  None    OBJECTIVE  Changes in objective findings: Lumbar AROM flexion hands to floor, ext min loss, SG L WNL, R min loss - all motions without pain today.      ASSESSMENT  Mami continues to require intervention to meet STG and LTG's: PT  Patient is progressing as expected.  Response to therapy has shown an improvement in pain level and ROM   Progress made towards STG/LTG?  None    PLAN  Current treatment program is being advanced to more complex exercises.    PTA/ATC plan:  N/A    Please refer to the daily flowsheet for treatment today, total treatment time and time spent performing 1:1 timed codes.

## 2020-11-18 NOTE — PROGRESS NOTES
DISCHARGE SUMMARY    Mami Dumont was seen 7 times for evaluation and treatment.  Patient did not return for further treatment and current status is unknown.   Please see goal flow sheet from episode noted date below and initial evaluation for further information.  Patient is discharged from therapy and therapy episode is resolved as of 9/22/20.      Linked Episodes   Type: Episode: Status: Noted: Resolved: Last update: Updated by:   PHYSICAL THERAPY Low Back Pain 7/21/2020 Active 7/21/2020 9/22/2020 10:43 AM Candy Morales, PT      Comments:

## 2020-11-20 ENCOUNTER — TELEPHONE (OUTPATIENT)
Dept: OPTOMETRY | Facility: CLINIC | Age: 75
End: 2020-11-20

## 2020-11-20 NOTE — TELEPHONE ENCOUNTER
She would like Dr Lira to call her back on Monday regarding her cataracts in her eye, she is having more trouble seeing and wondering if she should be seen sooner than her Jan 19th 2021 appointment with Dr. Preciado.

## 2020-11-20 NOTE — TELEPHONE ENCOUNTER
Mireya Dumont already sent a phone message to [place] about this concern and got an answer.  The phone message was not signed so I do not know who talked to patient.  I am willing to talk to patient, I assume there are no slots available until then and since she can see ok with other eye this appointment is less urgent. Please let me know what you would like me to say.      Mireya Lira, OD

## 2020-11-23 NOTE — TELEPHONE ENCOUNTER
I spoke with patient - she describes a gradual decrease in vision in her left eye.  She does a lot of driving -she  feels her vision is compromised, and is becoming a problem.    I offered her an appointment with Dr. Preciado on 12-4-20 at 7:45 for a Comprehensive Eye Exam to evaluate her vision.

## 2020-12-04 ENCOUNTER — OFFICE VISIT (OUTPATIENT)
Dept: OPHTHALMOLOGY | Facility: CLINIC | Age: 75
End: 2020-12-04
Payer: COMMERCIAL

## 2020-12-04 DIAGNOSIS — Z01.01 ENCOUNTER FOR EXAMINATION OF EYES AND VISION WITH ABNORMAL FINDINGS: Primary | ICD-10-CM

## 2020-12-04 DIAGNOSIS — H52.4 PRESBYOPIA: ICD-10-CM

## 2020-12-04 DIAGNOSIS — H25.811 COMBINED FORMS OF AGE-RELATED CATARACT OF RIGHT EYE: ICD-10-CM

## 2020-12-04 DIAGNOSIS — H25.812 COMBINED FORMS OF AGE-RELATED CATARACT OF LEFT EYE: ICD-10-CM

## 2020-12-04 DIAGNOSIS — H40.003 GLAUCOMA SUSPECT OF BOTH EYES: ICD-10-CM

## 2020-12-04 PROCEDURE — 92015 DETERMINE REFRACTIVE STATE: CPT | Performed by: OPHTHALMOLOGY

## 2020-12-04 PROCEDURE — 92014 COMPRE OPH EXAM EST PT 1/>: CPT | Performed by: OPHTHALMOLOGY

## 2020-12-04 ASSESSMENT — VISUAL ACUITY
OD_SC: 20/40-2
OS_SC: CF
CORRECTION_TYPE: GLASSES
METHOD: SNELLEN - LINEAR

## 2020-12-04 ASSESSMENT — TONOMETRY
OS_IOP_MMHG: 19
OD_IOP_MMHG: 20
IOP_METHOD: APPLANATION

## 2020-12-04 ASSESSMENT — REFRACTION_MANIFEST
OD_AXIS: 130
OD_ADD: +2.75
OD_CYLINDER: +0.75
OD_SPHERE: -1.25

## 2020-12-04 ASSESSMENT — CONF VISUAL FIELD
OS_NORMAL: 1
OD_NORMAL: 1

## 2020-12-04 NOTE — PROGRESS NOTES
Current Eye Medications:  no     Subjective:  Comprehensive eye exam.   Pt reports that glare from light at night makes if harder to see when driving, vision much  worse in left eye, first notice a couple of months ago. Pt uses otc readers only.    Wears readers only; has noted decreased vision in distance.  Vision left eye worsening last 2 months.  RH, but ambidextrous.  Could drive next day if necessary.  Lives alone; kids in [place].     Objective:  See Ophthalmology Exam.       Assessment:  Visually significant cataract left eye.      ICD-10-CM    1. Encounter for examination of eyes and vision with abnormal findings  Z01.01    2. Presbyopia  H52.4 REFRACTIVE STATUS     EYE EXAM (SIMPLE-NONBILLABLE)   3. Combined forms of age-related cataract, mod-marked, of left eye  H25.812    4. Combined forms of age-related cataract, mild-mod, of right eye  H25.811    5. Glaucoma suspect of both eyes  H40.003         Plan:  We will contact you to schedule cataract surgery left eye.  You check with family to arrange ride to [facility] and for someone to be with you overnight after the surgery.  Alvin Preciado M.D.  [phone]    Prob target ~ - 1.25 left eye; prob D right eye thereafter (when appropriate).  Trypan left eye.

## 2020-12-04 NOTE — LETTER
12/4/2020         RE: Mami Dumont  [address]  [place] [facility] [place] [address]        Dear Colleague,    Thank you for referring your patient, Mami Dumont, to the [facility]. Please see a copy of my visit note below.     Current Eye Medications:  no     Subjective:  Comprehensive eye exam.   Pt reports that glare from light at night makes if harder to see when driving, vision much  worse in left eye, first notice a couple of months ago. Pt uses otc readers only.    Wears readers only; has noted decreased vision in distance.  Vision left eye worsening last 2 months.  RH, but ambidextrous.  Could drive next day if necessary.  Lives alone; kids in [place].     Objective:  See Ophthalmology Exam.       Assessment:  Visually significant cataract left eye.      ICD-10-CM    1. Encounter for examination of eyes and vision with abnormal findings  Z01.01    2. Presbyopia  H52.4 REFRACTIVE STATUS     EYE EXAM (SIMPLE-NONBILLABLE)   3. Combined forms of age-related cataract, mod-marked, of left eye  H25.812    4. Combined forms of age-related cataract, mild-mod, of right eye  H25.811    5. Glaucoma suspect of both eyes  H40.003         Plan:  We will contact you to schedule cataract surgery left eye.  You check with family to arrange ride to [facility] and for someone to be with you overnight after the surgery.  Alvin Preciado M.D.  [phone]    Prob target ~ - 1.25 left eye; prob D right eye thereafter (when appropriate).  Trypan left eye.               Again, thank you for allowing me to participate in the care of your patient.        Sincerely,        Alvin Preciado MD

## 2020-12-04 NOTE — PATIENT INSTRUCTIONS
We will contact you to schedule cataract surgery left eye.  You check with family to arrange ride to [facility] and for someone to be with you overnight after the surgery.  Alvin Preciado M.D.  [phone]

## 2020-12-05 PROBLEM — H25.811 COMBINED FORMS OF AGE-RELATED CATARACT OF RIGHT EYE: Status: ACTIVE | Noted: 2020-12-05

## 2020-12-05 PROBLEM — H25.812 COMBINED FORMS OF AGE-RELATED CATARACT OF LEFT EYE: Status: ACTIVE | Noted: 2020-12-05

## 2020-12-05 ASSESSMENT — SLIT LAMP EXAM - LIDS
COMMENTS: GOOD COSMESIS
COMMENTS: GOOD COSMESIS

## 2020-12-05 ASSESSMENT — CUP TO DISC RATIO
OD_RATIO: 0.4
OS_RATIO: DIM VIEW

## 2020-12-05 ASSESSMENT — EXTERNAL EXAM - RIGHT EYE: OD_EXAM: MOD BROW

## 2020-12-05 ASSESSMENT — EXTERNAL EXAM - LEFT EYE: OS_EXAM: MOD BROW

## 2020-12-07 ENCOUNTER — TELEPHONE (OUTPATIENT)
Dept: OPHTHALMOLOGY | Facility: CLINIC | Age: 75
End: 2020-12-07

## 2020-12-07 NOTE — TELEPHONE ENCOUNTER
Type of surgery: Kelman phacoemulsification with lens implant-not clear cornea  CPT 57584   Encounter for examination of eyes and vision with abnormal findings Z01.01    Presbyopia  H52.4     Combined forms of age-related cataract, mod-marked, of left eye H25.812     Combined forms of age-related cataract, mild-mod, of right eye H25.811    Location of surgery: Other: [facility]  Date and time of surgery: 12-28-20  1:15pm  Surgeon: Dr Preciado  Pre-Op Appt Date: 12-15-20 & 12-18-20  Post-Op Appt Date: 12-29-20, 1-5-21 & 1-26-21   Packet sent out: Yes  Pre-cert/Authorization completed:    Waiting on  for CPT  No prior auth needed for [employer] and ok for [facility]  Date: 12/07/2020    Thank you,   Venecia Akins   Prior Authorization [facility]  [phone]

## 2020-12-08 DIAGNOSIS — I10 BENIGN ESSENTIAL HYPERTENSION: ICD-10-CM

## 2020-12-10 RX ORDER — AMLODIPINE BESYLATE 5 MG/1
5 TABLET ORAL DAILY
Qty: 90 TABLET | Refills: 0 | Status: SHIPPED | OUTPATIENT
Start: 2020-12-10 | End: 2021-03-29

## 2020-12-10 NOTE — TELEPHONE ENCOUNTER
Routing refill request to provider for review/approval because:  Labs not current:    Creatinine   Date Value Ref Range Status   11/08/2019 0.84 0.52 - 1.04 mg/dL Final         Ellyn FUN, RN, CPN

## 2020-12-15 ENCOUNTER — OFFICE VISIT (OUTPATIENT)
Dept: FAMILY MEDICINE | Facility: CLINIC | Age: 75
End: 2020-12-15
Payer: COMMERCIAL

## 2020-12-15 VITALS
WEIGHT: 113.5 LBS | HEART RATE: 65 BPM | TEMPERATURE: 97.6 F | SYSTOLIC BLOOD PRESSURE: 144 MMHG | DIASTOLIC BLOOD PRESSURE: 70 MMHG | BODY MASS INDEX: 22.92 KG/M2

## 2020-12-15 DIAGNOSIS — Z01.818 PREOP GENERAL PHYSICAL EXAM: Primary | ICD-10-CM

## 2020-12-15 DIAGNOSIS — H26.9 CATARACT OF LEFT EYE, UNSPECIFIED CATARACT TYPE: ICD-10-CM

## 2020-12-15 LAB
BASOPHILS # BLD AUTO: 0.1 10E9/L (ref 0–0.2)
BASOPHILS NFR BLD AUTO: 0.5 %
CREAT SERPL-MCNC: 0.8 MG/DL (ref 0.52–1.04)
DIFFERENTIAL METHOD BLD: NORMAL
EOSINOPHIL # BLD AUTO: 0.4 10E9/L (ref 0–0.7)
EOSINOPHIL NFR BLD AUTO: 3.6 %
ERYTHROCYTE [DISTWIDTH] IN BLOOD BY AUTOMATED COUNT: 13.6 % (ref 10–15)
GFR SERPL CREATININE-BSD FRML MDRD: 72 ML/MIN/{1.73_M2}
HCT VFR BLD AUTO: 39.6 % (ref 35–47)
HGB BLD-MCNC: 13.3 G/DL (ref 11.7–15.7)
LYMPHOCYTES # BLD AUTO: 2.3 10E9/L (ref 0.8–5.3)
LYMPHOCYTES NFR BLD AUTO: 23 %
MCH RBC QN AUTO: 31 PG (ref 26.5–33)
MCHC RBC AUTO-ENTMCNC: 33.6 G/DL (ref 31.5–36.5)
MCV RBC AUTO: 92 FL (ref 78–100)
MONOCYTES # BLD AUTO: 0.9 10E9/L (ref 0–1.3)
MONOCYTES NFR BLD AUTO: 8.5 %
NEUTROPHILS # BLD AUTO: 6.4 10E9/L (ref 1.6–8.3)
NEUTROPHILS NFR BLD AUTO: 64.4 %
PLATELET # BLD AUTO: 319 10E9/L (ref 150–450)
POTASSIUM SERPL-SCNC: 4.4 MMOL/L (ref 3.4–5.3)
RBC # BLD AUTO: 4.29 10E12/L (ref 3.8–5.2)
WBC # BLD AUTO: 10 10E9/L (ref 4–11)

## 2020-12-15 PROCEDURE — 36415 COLL VENOUS BLD VENIPUNCTURE: CPT | Performed by: FAMILY MEDICINE

## 2020-12-15 PROCEDURE — 99215 OFFICE O/P EST HI 40 MIN: CPT | Performed by: FAMILY MEDICINE

## 2020-12-15 PROCEDURE — 84132 ASSAY OF SERUM POTASSIUM: CPT | Performed by: FAMILY MEDICINE

## 2020-12-15 PROCEDURE — 85025 COMPLETE CBC W/AUTO DIFF WBC: CPT | Performed by: FAMILY MEDICINE

## 2020-12-15 PROCEDURE — 82565 ASSAY OF CREATININE: CPT | Performed by: FAMILY MEDICINE

## 2020-12-15 ASSESSMENT — PAIN SCALES - GENERAL: PAINLEVEL: NO PAIN (0)

## 2020-12-15 NOTE — LETTER
December 16, 2020      Mami Dumont  [address]   [place] [address]        Dear ,    We are writing to inform you of your test results.    preop labs are fine         Resulted Orders   Potassium   Result Value Ref Range    Potassium 4.4 3.4 - 5.3 mmol/L   Creatinine   Result Value Ref Range    Creatinine 0.80 0.52 - 1.04 mg/dL    GFR Estimate 72 >60 mL/min/[1.73_m2]      Comment:      Non  GFR Calc  Starting 12/18/2018, serum creatinine based estimated GFR (eGFR) will be   calculated using the Chronic Kidney Disease Epidemiology Collaboration   (CKD-EPI) equation.      GFR Estimate If Black 84 >60 mL/min/[1.73_m2]      Comment:       GFR Calc  Starting 12/18/2018, serum creatinine based estimated GFR (eGFR) will be   calculated using the Chronic Kidney Disease Epidemiology Collaboration   (CKD-EPI) equation.     CBC with platelets differential   Result Value Ref Range    WBC 10.0 4.0 - 11.0 10e9/L    RBC Count 4.29 3.8 - 5.2 10e12/L    Hemoglobin 13.3 11.7 - 15.7 g/dL    Hematocrit 39.6 35.0 - 47.0 %    MCV 92 78 - 100 fl    MCH 31.0 26.5 - 33.0 pg    MCHC 33.6 31.5 - 36.5 g/dL    RDW 13.6 10.0 - 15.0 %    Platelet Count 319 150 - 450 10e9/L    % Neutrophils 64.4 %    % Lymphocytes 23.0 %    % Monocytes 8.5 %    % Eosinophils 3.6 %    % Basophils 0.5 %    Absolute Neutrophil 6.4 1.6 - 8.3 10e9/L    Absolute Lymphocytes 2.3 0.8 - 5.3 10e9/L    Absolute Monocytes 0.9 0.0 - 1.3 10e9/L    Absolute Eosinophils 0.4 0.0 - 0.7 10e9/L    Absolute Basophils 0.1 0.0 - 0.2 10e9/L    Diff Method Automated Method        If you have any questions or concerns, please call the clinic at the number listed above.       Sincerely,      Rick Hanks MD/bari

## 2020-12-15 NOTE — PATIENT INSTRUCTIONS

## 2020-12-15 NOTE — PROGRESS NOTES
[facility]  [address]  [place] [address]  Phone: [phone]  Primary Provider: [facility], [facility]  Pre-op Performing Provider: SARIKA RAMIREZ    PREOPERATIVE EVALUATION:  Today's date: 12/15/2020    Mami Dumont is a 75 year old female who presents for a preoperative evaluation.    Surgical Information:  Surgery/Procedure: Cataract  Surgery Location: [facility]  Surgeon: Ilana  Surgery Date: 12/28/2020  Time of Surgery: ?  Where patient plans to recover: At home with family  Fax number for surgical facility: [phone]    Type of Anesthesia Anticipated: Local    Subjective     HPI related to upcoming procedure: 75 year old female  With cataract left eye.  To have procedure dec 28.       Preop Questions 12/15/2020   1. Have you ever had a heart attack or stroke? No   2. Have you ever had surgery on your heart or blood vessels, such as a stent placement, a coronary artery bypass, or surgery on an artery in your head, neck, heart, or legs? No   3. Do you have chest pain with activity? No   4. Do you have a history of  heart failure? No   5. Do you currently have a cold, bronchitis or symptoms of other infection? No   6. Do you have a cough, shortness of breath, or wheezing? No   7. Do you or anyone in your family have previous history of blood clots? No   8. Do you or does anyone in your family have a serious bleeding problem such as prolonged bleeding following surgeries or cuts? No   9. Have you ever had problems with anemia or been told to take iron pills? No   10. Have you had any abnormal blood loss such as black, tarry or bloody stools, or abnormal vaginal bleeding? No   11. Have you ever had a blood transfusion? No   12. Are you willing to have a blood transfusion if it is medically needed before, during, or after your surgery? Yes   13. Have you or any of your relatives ever had problems with anesthesia? No   14. Do you have sleep apnea, excessive  snoring or daytime drowsiness? No   15. Do you have any artifical heart valves or other implanted medical devices like a pacemaker, defibrillator, or continuous glucose monitor? No   16. Do you have artificial joints? No   17. Are you allergic to latex? No     Health Care Directive:  Patient does not have a Health Care Directive or Living Will: Discussed advance care planning with patient; information given to patient to review.    Preoperative Review of :  Patient not on any controlled substances            Review of Systems  Constitutional, neuro, ENT, endocrine, pulmonary, cardiac, gastrointestinal, genitourinary, musculoskeletal, integument and psychiatric systems are negative, except as otherwise noted.  No recent illnesses        Patient Active Problem List    Diagnosis Date Noted     Combined forms of age-related cataract, mod-marked, of left eye 12/05/2020     Priority: Medium     Combined forms of age-related cataract, mild-mod, of right eye 12/05/2020     Priority: Medium     Bilateral low back pain with left-sided sciatica 07/21/2020     Priority: Medium     Internal hemorrhoids 10/16/2018     Priority: Medium     Coronary artery disease due to calcified coronary lesion 07/09/2017     Priority: Medium     Compression fracture of L1 lumbar vertebra, closed, initial encounter (H) 07/09/2017     Priority: Medium     Family history of premature CAD 07/09/2017     Priority: Medium     Glaucoma suspect of both eyes 03/14/2017     Priority: Medium     Essential tremor 01/23/2014     Priority: Medium     Hypertension goal BP (blood pressure) < 140/90 09/10/2013     Priority: Medium     Tubular adenoma of colon      Priority: Medium     Dermatochalasis, ou 05/23/2013     Priority: Medium     Brow ptosis, ou 05/23/2013     Priority: Medium     Hyperlipidemia LDL goal <160 04/24/2013     Priority: Medium     Heme positive stool 04/24/2013     Priority: Medium     CARDIOVASCULAR SCREENING; LDL GOAL LESS THAN 160  04/05/2013     Priority: Medium     Advanced directives, counseling/discussion 04/03/2013     Priority: Medium     Advance Care Planning:   Form given Venecia Lu MA               Diagnostic skin and sensitization tests      Priority: Medium     1/99 skin tests: pos. for cat/DM/M/RW per Dr. Shah.        Allergic rhinitis due to animal dander      Priority: Medium     Seasonal allergic rhinitis      Priority: Medium     House dust mite allergy      Priority: Medium     Rhinitis, allergic to other allergen      Priority: Medium     IMO update changed this record. Please review for accuracy       Seasonal allergic conjunctivitis      Priority: Medium      Past Medical History:   Diagnosis Date     Allergic rhinitis due to animal dander 1/99 skin tests: pos. for cat/DM/M/RW per Dr. Shah.           Coronary artery disease due to calcified coronary lesion 7/9/2017     Diagnostic skin and sensitization tests     1/99 skin tests: pos. for cat/DM/M/RW per Dr. Shah.      House dust mite allergy      Hypertension goal BP (blood pressure) < 140/90 9/10/2013     Open angle with borderline findings, low risk 8/29/2013     Rhinitis, allergic to other allergen      Seasonal allergic conjunctivitis      Seasonal allergic rhinitis     hx of elevated BP on Sudafed, Pt told to discontinue Sudafed products by Dr. Taylor on 3/1/10 visit.      Tubular adenoma of colon 5/2013    colonoscopy due 5/2018     Past Surgical History:   Procedure Laterality Date     BLEPHAROPLASTY, BROW LIFT BILATERAL, COMBINED  5/2/13    with snip punctoplasty,[place]     BLEPHAROPLASTY, BROW LIFT BILATERAL, COMBINED Bilateral 5/19/2017    Procedure: COMBINED BLEPHAROPLASTY, BROW LIFT BILATERAL;  BILATERAL UPPER LID BLEPHAROPLASTY WITH BROW PTOSIS ;  Surgeon: Faisal Alicea MD;  Location: [facility]     ENDOSCOPIC POLYPECTOMY NASAL  1960    nasal polyp removal     Current Outpatient Medications   Medication Sig Dispense Refill     amLODIPine (NORVASC) 5 MG tablet  Take 1 tablet (5 mg) by mouth daily 90 tablet 0     aspirin 81 MG EC tablet Take 1 tablet (81 mg) by mouth daily 90 tablet 3     FLONASE 50 MCG/ACT nasal spray Spray 2 sprays in nostril daily Brand necessary. Patient gets headache with generic. 16 g 3     Hypertonic Nasal Wash (SINUS RINSE BOTTLE KIT NA) Spray 1 Bottle in nostril as needed.       loratadine-pseudoePHEDrine (CLARITIN-D 12 HOUR) 5-120 MG per tablet Take 1 tablet by mouth as needed Taking less than prior due to hypertension.       meloxicam (MOBIC) 15 MG tablet Take 1 tablet (15 mg) by mouth daily 30 tablet 0     methocarbamol (ROBAXIN) 500 MG tablet Take 1 tablet (500 mg) by mouth 4 times daily as needed for muscle spasms 30 tablet 0     Multiple Vitamins-Minerals (CENTRUM PO) Take  by mouth. Takes 1 daily       naproxen (NAPROSYN) 500 MG tablet Take 500 mg by mouth 2 times daily (with meals).       propranolol HCl 60 MG TABS Take 1 tablet by mouth 20 mg as needed for tremors       STATIN NOT PRESCRIBED, INTENTIONAL, 1 each continuous prn. Statin not prescribed intentionally due to Refusal by patient 0 each 0     VITAMIN E-200 PO Take  by mouth. Takes 1 daily     to clarify,  Patient not on aspirin  She does take naproxen    Allergies   Allergen Reactions     Penicillins Shortness Of Breath and Hives     Ace Inhibitors      Head tremor        Social History     Tobacco Use     Smoking status: Never Smoker     Smokeless tobacco: Never Used     Tobacco comment: Lives in smoke free household   Substance Use Topics     Alcohol use: Yes     Comment: occassional        History   Drug Use No         Objective     BP (!) 159/83 (BP Location: Left arm, Patient Position: Chair, Cuff Size: Adult Regular)   Pulse 65   Temp 97.6  F (36.4  C) (Oral)   Wt 51.5 kg (113 lb 8 oz)   Breastfeeding No   BMI 22.92 kg/m      Physical Exam    GENERAL APPEARANCE: healthy, alert and no distress     EYES: EOMI, PERRL     HENT: ear canals and TM's normal and nose and mouth  without ulcers or lesions     NECK: no adenopathy, no asymmetry, masses, or scars and thyroid normal to palpation     RESP: lungs clear to auscultation - no rales, rhonchi or wheezes     CV: regular rates and rhythm, normal S1 S2, no S3 or S4 and no murmur, click or rub     ABDOMEN:  soft, nontender, no HSM or masses and bowel sounds normal     MS: extremities normal- no gross deformities noted, no evidence of inflammation in joints, FROM in all extremities.     SKIN: no suspicious lesions or rashes     NEURO: Normal strength and tone, sensory exam grossly normal, mentation intact and speech normal     PSYCH: mentation appears normal. and affect normal/bright     LYMPHATICS: No cervical adenopathy    Recent Labs   Lab Test 11/08/19  1007      POTASSIUM 4.8   CR 0.84        Diagnostics:      No EKG required for low risk surgery (cataract, skin procedure, breast biopsy, etc).  Labs drawn , K and creat and cbc       Revised Cardiac Risk Index (RCRI):  The patient has the following serious cardiovascular risks for perioperative complications:   - No serious cardiac risks = 0 points     RCRI Interpretation: 0 points: Class I (very low risk - 0.4% complication rate)            Assessment & Plan   The proposed surgical procedure is considered LOW risk.           Risks and Recommendations:  The patient has the following additional risks and recommendations for perioperative complications:   -      Medication Instructions:  pt will try to hold the naproxen for a week prior to procedure; use tylenol or muscle relaxer instead    RECOMMENDATION:  APPROVAL GIVEN to proceed with proposed procedure, without further diagnostic evaluation, providing labs are okay.      No cardiac history or symptoms.    No bleeding or clotting problems    No history of anesthesia problems    Signed Electronically by: Rick Hanks MD    Copy of this evaluation report is provided to requesting physician.    [facility]  [facility] Preop Guidelines    Revised Cardiac Risk Index

## 2020-12-18 ENCOUNTER — OFFICE VISIT (OUTPATIENT)
Dept: OPHTHALMOLOGY | Facility: CLINIC | Age: 75
End: 2020-12-18
Payer: COMMERCIAL

## 2020-12-18 DIAGNOSIS — H25.812 COMBINED FORMS OF AGE-RELATED CATARACT OF LEFT EYE: Primary | ICD-10-CM

## 2020-12-18 PROCEDURE — 92012 INTRM OPH EXAM EST PATIENT: CPT | Performed by: OPHTHALMOLOGY

## 2020-12-18 PROCEDURE — 92136 OPHTHALMIC BIOMETRY: CPT | Mod: TC | Performed by: OPHTHALMOLOGY

## 2020-12-18 RX ORDER — PREDNISOLONE ACETATE 10 MG/ML
1 SUSPENSION/ DROPS OPHTHALMIC 3 TIMES DAILY
Qty: 5 ML | Refills: 0 | Status: SHIPPED | OUTPATIENT
Start: 2020-12-29 | End: 2021-04-15

## 2020-12-18 RX ORDER — KETOROLAC TROMETHAMINE 5 MG/ML
1 SOLUTION OPHTHALMIC 3 TIMES DAILY
Qty: 5 ML | Refills: 0 | Status: SHIPPED | OUTPATIENT
Start: 2020-12-29 | End: 2021-04-15

## 2020-12-18 ASSESSMENT — KERATOMETRY
OD_K2POWER_DIOPTERS: 47.00
OS_AXISANGLE_DEGREES: 053
OD_K1POWER_DIOPTERS: 45.87
OS_K1POWER_DIOPTERS: 46.25
OS_K2POWER_DIOPTERS: 47.00
OD_AXISANGLE_DEGREES: 089

## 2020-12-18 ASSESSMENT — PACHYMETRY
OD_CT(UM): 630
OS_CT(UM): 636

## 2020-12-18 ASSESSMENT — VISUAL ACUITY
METHOD: SNELLEN - LINEAR
OS_SC: CF @3'
OD_SC+: +2
OD_SC: 20/60

## 2020-12-18 NOTE — PROGRESS NOTES
Current Eye Medications:       Subjective:  Patient here for pre-op cataract surgery, left eye, scheduled for 12-28-20.  History and Physical done.  Consent signed.   covid test is scheduled for the 24th at our [facility], but this needs to be cancelled.  I gave the patient the information for scheduling her Covid test at an [facility] facility.     Objective:  See Ophthalmology Exam.       Assessment:      Plan:   See Patient Instructions.

## 2020-12-18 NOTE — LETTER
12/18/2020         RE: Mami Dumont  [address]  [facility] [address]        Dear Colleague,    Thank you for referring your patient, Mami Dumont, to the [facility]. Please see a copy of my visit note below.     Current Eye Medications:       Subjective:  Patient here for pre-op cataract surgery, left eye, scheduled for 12-28-20.  History and Physical done.  Consent signed.   covid test is scheduled for the 24th at our [facility], but this needs to be cancelled.  I gave the patient the information for scheduling her Covid test at an [facility] facility.     Objective:  See Ophthalmology Exam.       Assessment:      Plan:   See Patient Instructions.          Current Eye Medications:       Subjective:  Patient here for pre-op cataract surgery, left eye, scheduled for 12-28-20.  History and Physical done.  Consent signed.   covid test is scheduled for the 24th at our [facility], but this needs to be cancelled.  I gave the patient the information for scheduling her Covid test at an [facility] facility.     Objective:  See Ophthalmology Exam.     Assessment:  Visually significant cataract left eye.      Plan:  Plan Kelman phacoemulsification/ posterior chamber lens left eye local standby.  Risks, benefits, complications, and alternatives discussed with patient including possibility of limitations from coexistent eye disease and loss of vision.  Target refraction and multifocal lens options discussed.  Patient understands and consents.  Alvin Preicado M.D.    See Patient Instructions.           Again, thank you for allowing me to participate in the care of your patient.        Sincerely,        Alvin Preciado MD

## 2020-12-18 NOTE — PATIENT INSTRUCTIONS
PRE-OP CATARACT INSTRUCTIONS    *You will be picking up 2 eye drop bottles from your pharmacy.  You will use these the day after surgery.    *No solid food after midnight.    *Clear liquids: coffee (no cream), tea, water, and jello are OK before 7:15 A.M.  You may take your regular pills with this.    *If you are taking glaucoma drops, continue as usual.    Use artificial tears 4 times daily both eyes.    Alvin Perciado M.D.  [phone]

## 2020-12-19 ASSESSMENT — EXTERNAL EXAM - RIGHT EYE: OD_EXAM: MOD BROW

## 2020-12-19 ASSESSMENT — SLIT LAMP EXAM - LIDS
COMMENTS: GOOD COSMESIS
COMMENTS: GOOD COSMESIS

## 2020-12-19 ASSESSMENT — EXTERNAL EXAM - LEFT EYE: OS_EXAM: MOD BROW

## 2020-12-19 NOTE — PROGRESS NOTES
Current Eye Medications:       Subjective:  Patient here for pre-op cataract surgery, left eye, scheduled for 12-28-20.  History and Physical done.  Consent signed.   covid test is scheduled for the 24th at our [facility], but this needs to be cancelled.  I gave the patient the information for scheduling her Covid test at an [facility] facility.     Objective:  See Ophthalmology Exam.     Assessment:  Visually significant cataract left eye.      Plan:  Plan Kelman phacoemulsification/ posterior chamber lens left eye local standby.  Risks, benefits, complications, and alternatives discussed with patient including possibility of limitations from coexistent eye disease and loss of vision.  Target refraction and multifocal lens options discussed.  Patient understands and consents.  Alvin Preciado M.D.    See Patient Instructions.

## 2020-12-28 PROBLEM — Z96.1 PSEUDOPHAKIA: Status: ACTIVE | Noted: 2020-12-28

## 2020-12-28 PROBLEM — H25.812 COMBINED FORMS OF AGE-RELATED CATARACT OF LEFT EYE: Status: RESOLVED | Noted: 2020-12-05 | Resolved: 2020-12-28

## 2020-12-29 ENCOUNTER — OFFICE VISIT (OUTPATIENT)
Dept: OPHTHALMOLOGY | Facility: CLINIC | Age: 75
End: 2020-12-29
Payer: COMMERCIAL

## 2020-12-29 DIAGNOSIS — Z96.1 PSEUDOPHAKIA: Primary | ICD-10-CM

## 2020-12-29 PROCEDURE — 99024 POSTOP FOLLOW-UP VISIT: CPT | Performed by: OPHTHALMOLOGY

## 2020-12-29 ASSESSMENT — VISUAL ACUITY
OS_PH_SC: 20/50+1
METHOD: SNELLEN - LINEAR
OS_SC: 20/60-2

## 2020-12-29 ASSESSMENT — SLIT LAMP EXAM - LIDS: COMMENTS: NORMAL

## 2020-12-29 ASSESSMENT — EXTERNAL EXAM - LEFT EYE: OS_EXAM: NORMAL

## 2020-12-29 ASSESSMENT — TONOMETRY
OS_IOP_MMHG: 17
IOP_METHOD: APPLANATION

## 2020-12-29 NOTE — PATIENT INSTRUCTIONS
POST-OP CATARACT INSTRUCTIONS    Use the following drops in the left eye:    Prednisolone (pink or white cap) 3 times a day  ketorolac (gray cap) 3 times a day    ~Wait at least 5 minutes between drops.    ~Wear eye shield at night for one week.    ~Do not exert yourself to the point that you are red in the face for one week.    ~If your vision worsens, eye becomes increasingly red, or becomes painful, call [phone].    ~If you are taking glaucoma medications, continue as usual.    ~OK to resume aspirin and/or other blood thinners.    Alvin Preciado M.D.

## 2020-12-29 NOTE — PROGRESS NOTES
Current Eye Medications:  Ketorolac and prednisolone three times a day left eye      Subjective:  po1 left eye   Pt reports this eye feels fine,has had no discomfort at all.   Objective:  See Ophthalmology Exam.       Assessment: 1 day sp Kelman phacoemulsification left eye doing well.      Plan:   POST-OP CATARACT INSTRUCTIONS    Use the following drops in the left eye:    Prednisolone (pink or white cap) 3 times a day  Ketorolac (gray cap) 3 times a day    ~Wait at least 5 minutes between drops.    ~Wear eye shield at night for one week.    ~Do not exert yourself to the point that you are red in the face for one week.    ~If your vision worsens, eye becomes increasingly red, or becomes painful, call [phone].    ~If you are taking glaucoma medications, continue as usual.    ~OK to resume aspirin and/or other blood thinners.    Alvin Preciado M.D.

## 2020-12-29 NOTE — LETTER
12/29/2020         RE: Mami Dumont  [address]  [facility] [address]        Dear Colleague,    Thank you for referring your patient, Mami Dumont, to the [facility]. Please see a copy of my visit note below.     Current Eye Medications:  Ketorolac and prednisolone three times a day left eye      Subjective:  po1 left eye   Pt reports this eye feels fine,has had no discomfort at all.   Objective:  See Ophthalmology Exam.       Assessment: 1 day sp Kelman phacoemulsification left eye doing well.      Plan:   POST-OP CATARACT INSTRUCTIONS    Use the following drops in the left eye:    Prednisolone (pink or white cap) 3 times a day  Ketorolac (gray cap) 3 times a day    ~Wait at least 5 minutes between drops.    ~Wear eye shield at night for one week.    ~Do not exert yourself to the point that you are red in the face for one week.    ~If your vision worsens, eye becomes increasingly red, or becomes painful, call [phone].    ~If you are taking glaucoma medications, continue as usual.    ~OK to resume aspirin and/or other blood thinners.    Alvin Preciado M.D.                 Again, thank you for allowing me to participate in the care of your patient.        Sincerely,        Alvin Preciado MD

## 2021-01-05 ENCOUNTER — OFFICE VISIT (OUTPATIENT)
Dept: OPHTHALMOLOGY | Facility: CLINIC | Age: 76
End: 2021-01-05
Payer: COMMERCIAL

## 2021-01-05 DIAGNOSIS — Z96.1 PSEUDOPHAKIA: Primary | ICD-10-CM

## 2021-01-05 PROCEDURE — 99024 POSTOP FOLLOW-UP VISIT: CPT | Performed by: OPHTHALMOLOGY

## 2021-01-05 ASSESSMENT — TONOMETRY
OS_IOP_MMHG: 20
IOP_METHOD: APPLANATION

## 2021-01-05 ASSESSMENT — REFRACTION_MANIFEST
OS_AXIS: 055
OS_CYLINDER: +1.50
OS_SPHERE: -0.75

## 2021-01-05 ASSESSMENT — VISUAL ACUITY
METHOD: ALLEN CARDS
OS_SC: 20/40-2

## 2021-01-05 ASSESSMENT — SLIT LAMP EXAM - LIDS: COMMENTS: NORMAL

## 2021-01-05 ASSESSMENT — EXTERNAL EXAM - LEFT EYE: OS_EXAM: NORMAL

## 2021-01-05 NOTE — PROGRESS NOTES
Current Eye Medications:  Ketorolac and  Prednisolone three times a day left eye      Subjective:  Po2 left eye   Pt reports that she is seeing well, sometimes things can look a little double.    Occasional vertical diplopia.      Objective:  See Ophthalmology Exam.       Assessment:  Doing well status/post Kelman phacoemulsification/ posterior chamber lens left eye.      Plan:   See Patient Instructions.

## 2021-01-05 NOTE — PATIENT INSTRUCTIONS
1)   Continue ketorolac  (gray) and prednisolone (pink) three times daily until each is gone.    2)   Stop shield one week following surgery.    3)   No eye rubbing.    4)   Return one month for final exam.     Alvin Preciado M.D.

## 2021-01-05 NOTE — LETTER
1/5/2021         RE: Mami Dumont  [address]  [facility] [address]        Dear Colleague,    Thank you for referring your patient, Mami Dumont, to the [facility]. Please see a copy of my visit note below.     Current Eye Medications:  Ketorolac and  Prednisolone three times a day left eye      Subjective:  Po2 left eye   Pt reports that she is seeing well, sometimes things can look a little double.    Occasional vertical diplopia.      Objective:  See Ophthalmology Exam.       Assessment:  Doing well status/post Kelman phacoemulsification/ posterior chamber lens left eye.      Plan:   See Patient Instructions.           Again, thank you for allowing me to participate in the care of your patient.        Sincerely,        Alvin Preciado MD

## 2021-01-26 ENCOUNTER — OFFICE VISIT (OUTPATIENT)
Dept: OPHTHALMOLOGY | Facility: CLINIC | Age: 76
End: 2021-01-26
Payer: COMMERCIAL

## 2021-01-26 DIAGNOSIS — Z96.1 PSEUDOPHAKIA: Primary | ICD-10-CM

## 2021-01-26 PROCEDURE — 99024 POSTOP FOLLOW-UP VISIT: CPT | Performed by: OPHTHALMOLOGY

## 2021-01-26 ASSESSMENT — REFRACTION_WEARINGRX
OS_SPHERE: +1.75
SPECS_TYPE: OTC READERS
OD_SPHERE: +175

## 2021-01-26 ASSESSMENT — REFRACTION_MANIFEST
OD_AXIS: 137
OD_SPHERE: -1.50
OD_CYLINDER: +1.00
OS_AXIS: 029
OS_ADD: +3.00
OD_ADD: +3.00
OS_SPHERE: -0.25
OS_CYLINDER: +1.00

## 2021-01-26 ASSESSMENT — VISUAL ACUITY
OD_PH_SC+: -2
OD_SC: 20/50
OS_SC+: -2
OD_SC+: -2
OD_PH_SC: 20/30
OS_PH_SC+: -1
METHOD: SNELLEN - LINEAR
OS_PH_SC: 20/30
OS_SC: 20/40

## 2021-01-26 ASSESSMENT — EXTERNAL EXAM - LEFT EYE: OS_EXAM: NORMAL

## 2021-01-26 ASSESSMENT — EXTERNAL EXAM - RIGHT EYE: OD_EXAM: MOD BROW

## 2021-01-26 ASSESSMENT — CUP TO DISC RATIO: OS_RATIO: 0.5

## 2021-01-26 ASSESSMENT — SLIT LAMP EXAM - LIDS
COMMENTS: NORMAL
COMMENTS: GOOD COSMESIS

## 2021-01-26 ASSESSMENT — TONOMETRY
IOP_METHOD: APPLANATION
OS_IOP_MMHG: 17

## 2021-01-26 NOTE — PROGRESS NOTES
Current Eye Medications:  Prednisolone and Ketorolac three times a day left eye @ 10AM     Subjective:  Final mr left eye : kelman phacoemulsification left eye 12-  Patient states left eye is doing well.    Still occasional vertical diplopia, but better.     Objective:  See Ophthalmology Exam.       Assessment: Doing well status/post Kelman phacoemulsification/ posterior chamber lens left eye.  Visually significant cataract right eye.      Plan:   See Patient Instructions.

## 2021-01-26 NOTE — LETTER
1/26/2021         RE: Mami Dumont  [address]  [facility] [address]        Dear Colleague,    Thank you for referring your patient, Mami Dumont, to the [facility]. Please see a copy of my visit note below.     Current Eye Medications:  Prednisolone and Ketorolac three times a day left eye @ 10AM     Subjective:  Final mr left eye : kelman phacoemulsification left eye 12-  Patient states left eye is doing well.    Still occasional vertical diplopia, but better.     Objective:  See Ophthalmology Exam.       Assessment: Doing well status/post Kelman phacoemulsification/ posterior chamber lens left eye.  Visually significant cataract right eye.      Plan:   See Patient Instructions.           Again, thank you for allowing me to participate in the care of your patient.        Sincerely,        Alvin Preciado MD

## 2021-01-26 NOTE — PATIENT INSTRUCTIONS
1)  Discontinue all drops, unless used prior to cataract surgery.    2)   Fill Rx for new glasses or drugstore readers.    3)   Return to clinic in three months for a pressure check or earlier if problems should arise.     4)  Cataract surgery right eye is an option at anytime.    Alvin Preciado M.D.  [phone]

## 2021-03-02 ENCOUNTER — TRANSFERRED RECORDS (OUTPATIENT)
Dept: HEALTH INFORMATION MANAGEMENT | Facility: CLINIC | Age: 76
End: 2021-03-02

## 2021-03-05 ENCOUNTER — IMMUNIZATION (OUTPATIENT)
Dept: NURSING | Facility: CLINIC | Age: 76
End: 2021-03-05
Payer: COMMERCIAL

## 2021-03-05 PROCEDURE — 91300 PR COVID VAC PFIZER DIL RECON 30 MCG/0.3 ML IM: CPT

## 2021-03-05 PROCEDURE — 0001A PR COVID VAC PFIZER DIL RECON 30 MCG/0.3 ML IM: CPT

## 2021-03-20 ENCOUNTER — HEALTH MAINTENANCE LETTER (OUTPATIENT)
Age: 76
End: 2021-03-20

## 2021-03-26 ENCOUNTER — IMMUNIZATION (OUTPATIENT)
Dept: NURSING | Facility: CLINIC | Age: 76
End: 2021-03-26
Attending: FAMILY MEDICINE
Payer: COMMERCIAL

## 2021-03-26 PROCEDURE — 91300 PR COVID VAC PFIZER DIL RECON 30 MCG/0.3 ML IM: CPT

## 2021-03-26 PROCEDURE — 0002A PR COVID VAC PFIZER DIL RECON 30 MCG/0.3 ML IM: CPT

## 2021-03-29 ENCOUNTER — OFFICE VISIT (OUTPATIENT)
Dept: FAMILY MEDICINE | Facility: CLINIC | Age: 76
End: 2021-03-29
Payer: COMMERCIAL

## 2021-03-29 VITALS
SYSTOLIC BLOOD PRESSURE: 138 MMHG | HEART RATE: 71 BPM | DIASTOLIC BLOOD PRESSURE: 78 MMHG | OXYGEN SATURATION: 100 % | TEMPERATURE: 97.6 F | HEIGHT: 59 IN | WEIGHT: 113 LBS | BODY MASS INDEX: 22.78 KG/M2

## 2021-03-29 DIAGNOSIS — I10 BENIGN ESSENTIAL HYPERTENSION: Primary | ICD-10-CM

## 2021-03-29 DIAGNOSIS — Z12.31 ENCOUNTER FOR SCREENING MAMMOGRAM FOR BREAST CANCER: ICD-10-CM

## 2021-03-29 DIAGNOSIS — Z78.0 MENOPAUSE: ICD-10-CM

## 2021-03-29 PROCEDURE — 99213 OFFICE O/P EST LOW 20 MIN: CPT | Performed by: FAMILY MEDICINE

## 2021-03-29 RX ORDER — AMLODIPINE BESYLATE 5 MG/1
5 TABLET ORAL DAILY
Qty: 90 TABLET | Refills: 1 | Status: SHIPPED | OUTPATIENT
Start: 2021-03-29 | End: 2021-11-17

## 2021-03-29 ASSESSMENT — ENCOUNTER SYMPTOMS
CARDIOVASCULAR NEGATIVE: 1
NECK PAIN: 0
ABDOMINAL DISTENTION: 0
DIAPHORESIS: 0
CONSTIPATION: 0
HEMATURIA: 0
LIGHT-HEADEDNESS: 0
ARTHRALGIAS: 1
WOUND: 0
PALPITATIONS: 0
HEADACHES: 0
UNEXPECTED WEIGHT CHANGE: 0
HEARTBURN: 0
NECK STIFFNESS: 0
VOMITING: 0
NAUSEA: 0
PARESTHESIAS: 0
TREMORS: 0
FREQUENCY: 0
CONSTITUTIONAL NEGATIVE: 1
PHOTOPHOBIA: 0
NUMBNESS: 0
JOINT SWELLING: 0
SHORTNESS OF BREATH: 0
DYSURIA: 0
ABDOMINAL PAIN: 0
CHEST TIGHTNESS: 0
DIFFICULTY URINATING: 0
PSYCHIATRIC NEGATIVE: 1
MYALGIAS: 1
FLANK PAIN: 0
WHEEZING: 0
DIARRHEA: 0
DIZZINESS: 0
COLOR CHANGE: 0
WEAKNESS: 0
FATIGUE: 0
CHILLS: 0
APPETITE CHANGE: 0
BACK PAIN: 1
EYE DISCHARGE: 0
FEVER: 0

## 2021-03-29 ASSESSMENT — MIFFLIN-ST. JEOR: SCORE: 923.19

## 2021-03-29 NOTE — PATIENT INSTRUCTIONS
Patient Education     Preventing Osteoporosis: Meeting Your Calcium Needs    Your body needs calcium to build and repair bones. But it can't make calcium on its own. That's why it's important to eat calcium-rich foods. Some foods are naturally rich in calcium. Others have calcium added (fortified). It's best to get calcium from the foods you eat. But if you can't get enough, you may want to take calcium supplements. To meet your daily calcium needs, try the foods listed below.  Dairy Fish & beans Other sources   Source   Calcium (mg) per serving   Source   Calcium (mg) per serving   Source   Calcium (mg) per serving   Low-fat yogurt, plain   415 mg/8 oz.   Sardines, [place], canned, with bones   351 mg/3 oz.   Oatmeal, instant, fortified   215 mg/1 cup   Nonfat milk   302 mg/1 cup   [place], sockeye, canned, with bones   239 mg/3 oz.   Tofu made with calcium sulfate   204 mg/3 oz.   Low-fat milk   297 mg/1 cup   Soybeans, fresh, boiled   131 mg/1/2 cup   Collards   179 mg/1/2 cup   Swiss cheese   272 mg/1 oz.   White beans, cooked   81 mg/1/2 cup   English muffin, whole wheat   175 mg/1 muffin   Cheddar cheese   205 mg/1 oz.   Navy beans, cooked   79 mg/1/2 cup   Kale   90 mg/1/2 cup   Ice cream strawberry   79 mg/1/2 cup           Orange, navel   56 mg/1 medium   Note: Calcium levels may vary depending on brand and size.  Daily calcium needs  14 to 18 years old: 1,300 mg  19 to 30 years old: 1,000 mg  31 to 50 years old: 1,000 mg  51 to 70 years old, women: 1,200 mg  51 to 70 years old, men: 1,000 mg  Pregnant or nursin to 18 years old: 1,300 mg, 19 to 50 years old: 1,000 mg  Older than 70 (women and men): 1,200 mg   Too last reviewed this educational content on 2018-2020 [employer]. All rights reserved. This information is not intended as a substitute for professional medical care. Always follow your healthcare professional's instructions.

## 2021-03-29 NOTE — PROGRESS NOTES
Assessment & Plan       Benign essential hypertension    Mireya is a 75 year old with a history of hypertension who presents for medication check and blood pressure follow up. Patient reports she run out of her blood pressure medication (amlodopine) 2 weeks ago and was unable to refill with the pharmacy without prescription. Upon arrival Mireya had an elevated blood pressure 160/79. She was asymptomatic. Blood pressure rechecked after patient had rested for about 15 minutes for 138/78. Patient is stable with no acute clinical concerns.    - Refilled blood pressure medication for 6 month supply.    - amLODIPine (NORVASC) 5 MG tablet; Take 1 tablet (5 mg) by mouth daily    Encounter for screening mammogram for breast cancer  - MA SCREENING DIGITAL BILAT - Future  (s+30)    Menopause  - DEXA HIP/PELVIS/SPINE - Future  - Gave patient information on recommended diet to help with osteoporosis prevention  - Discussed Vit D supplement with patient. Patient states she has started taking Vitamin D supplement.      Discussed exercise and diet with patient. Patient encouraged to limit salt intake and continue to exercise as tolerated.                 No follow-ups on file.    JAQUELIN Liz, RN  DNP/FNP student    Bijan Atkinson MD  [facility] [place]   Mireya is a 75 year old who presents for the following health issues     History of Present Illness       Hypertension: She presents for follow up of hypertension.  She does not check blood pressure  regularly outside of the clinic. Outside blood pressures have been over 140/90. She does not follow a low salt diet.     She eats 0-1 servings of fruits and vegetables daily.She consumes 0 sweetened beverage(s) daily.She exercises with enough effort to increase her heart rate 30 to 60 minutes per day.  She exercises with enough effort to increase her heart rate 3 or less days per week.   She is taking medications regularly.     Patient run out of blood  "pressure medication (Amlpdopine) about 2 weeks ago. Unable to refill medication from pharmacy without a refill order.    Patient states increase pain contributing to elevated blood pressure. Patient wit a history of sciatic pain which is being managed by medication. She describes the pain as variable, rating it 8/10 at worse and 3/10 as tolerable.     Patient also reports labored breathing with exertion, started month ago. She endorsed lack of exercise due to increased pain could be contributing to the labored breathing. She denies activity intolerance and having labored breathing at rest. Patient also denies chest pain, dizziness, lightheadedness, syncope.       Review of Systems   Constitutional: Negative.  Negative for appetite change, chills, diaphoresis, fatigue, fever and unexpected weight change.   HENT: Negative.    Eyes: Negative for photophobia, discharge and visual disturbance.   Respiratory: Negative for chest tightness, shortness of breath and wheezing.    Cardiovascular: Negative.  Negative for chest pain, palpitations and peripheral edema.   Gastrointestinal: Negative for abdominal distention, abdominal pain, constipation, diarrhea, heartburn, nausea and vomiting.   Genitourinary: Negative for difficulty urinating, dysuria, flank pain, frequency, hematuria and urgency.   Musculoskeletal: Positive for arthralgias, back pain and myalgias. Negative for gait problem, joint swelling, neck pain and neck stiffness.   Skin: Negative for color change, rash and wound.   Neurological: Negative for dizziness, tremors, syncope, weakness, light-headedness, numbness, headaches and paresthesias.   Psychiatric/Behavioral: Negative.             Objective    /78   Pulse 71   Temp 97.6  F (36.4  C) (Tympanic)   Ht 1.499 m (4' 11\")   Wt 51.3 kg (113 lb)   SpO2 100%   BMI 22.82 kg/m    Body mass index is 22.82 kg/m .  Physical Exam  Constitutional:       General: She is not in acute distress.     Appearance: " Normal appearance. She is not ill-appearing, toxic-appearing or diaphoretic.   Cardiovascular:      Rate and Rhythm: Normal rate and regular rhythm.      Pulses: Normal pulses.      Heart sounds: Normal heart sounds. No murmur.   Pulmonary:      Effort: Pulmonary effort is normal. No respiratory distress.      Breath sounds: Normal breath sounds. No wheezing or rhonchi.   Musculoskeletal:         General: No swelling, tenderness, deformity or signs of injury.      Right lower leg: No edema.      Left lower leg: No edema.   Skin:     General: Skin is warm.      Capillary Refill: Capillary refill takes less than 2 seconds.      Coloration: Skin is not jaundiced.      Findings: No bruising, erythema, lesion or rash.   Neurological:      General: No focal deficit present.      Mental Status: She is alert and oriented to person, place, and time. Mental status is at baseline.      Sensory: No sensory deficit.      Motor: No weakness.   Psychiatric:         Mood and Affect: Mood normal.         Behavior: Behavior normal.         Thought Content: Thought content normal.         Judgment: Judgment normal.

## 2021-04-08 ENCOUNTER — ANCILLARY PROCEDURE (OUTPATIENT)
Dept: MAMMOGRAPHY | Facility: CLINIC | Age: 76
End: 2021-04-08
Attending: FAMILY MEDICINE
Payer: COMMERCIAL

## 2021-04-08 PROCEDURE — 77067 SCR MAMMO BI INCL CAD: CPT | Mod: TC | Performed by: RADIOLOGY

## 2021-04-15 ENCOUNTER — OFFICE VISIT (OUTPATIENT)
Dept: OPHTHALMOLOGY | Facility: CLINIC | Age: 76
End: 2021-04-15
Payer: COMMERCIAL

## 2021-04-15 DIAGNOSIS — Z96.1 PSEUDOPHAKIA: Primary | ICD-10-CM

## 2021-04-15 PROCEDURE — 92012 INTRM OPH EXAM EST PATIENT: CPT | Performed by: OPHTHALMOLOGY

## 2021-04-15 ASSESSMENT — VISUAL ACUITY
OS_SC+: -1
OD_PH_SC+: -1
OD_SC: 20/80
OS_SC: 20/30
OD_PH_SC: 20/40
METHOD: SNELLEN - LINEAR

## 2021-04-15 ASSESSMENT — TONOMETRY
OS_IOP_MMHG: 17
IOP_METHOD: APPLANATION
OD_IOP_MMHG: 21

## 2021-04-15 ASSESSMENT — SLIT LAMP EXAM - LIDS
COMMENTS: GOOD COSMESIS
COMMENTS: NORMAL

## 2021-04-15 ASSESSMENT — EXTERNAL EXAM - LEFT EYE: OS_EXAM: NORMAL

## 2021-04-15 ASSESSMENT — EXTERNAL EXAM - RIGHT EYE: OD_EXAM: MOD BROW

## 2021-04-15 NOTE — PROGRESS NOTES
Current Eye Medications:  Opcon-A as needed both eyes for itching     Subjective:  Three month follow up for a pressure check. Vision is a little more blurry right eye. Vision is doing fine left eye in distance and near. No eye pain or discomfort in either eye.      Objective:  See Ophthalmology Exam.       Assessment:  Doing well status/post Kelman phacoemulsification/ posterior chamber lens left eye.      Plan:  Ok to continue Opcon-A as needed both eyes.  Use artificial tears up to 4 times daily both eyes as needed (Refresh Tears, Systane Ultra/Balance, or Theratears)   Possible clouding of posterior capsule left eye discussed.   Offered cataract surgery right eye at anytime.  Otherwise Call in December 2021 for an appointment in April 2022 for Complete Exam    Dr. Preciado [phone]

## 2021-04-15 NOTE — PATIENT INSTRUCTIONS
Ok to continue Opcon-A as needed both eyes.  Use artificial tears up to 4 times daily both eyes as needed (Refresh Tears, Systane Ultra/Balance, or Theratears)   Possible clouding of posterior capsule left eye discussed.   Offered cataract surgery right eye at anytime.  Otherwise Call in December 2021 for an appointment in April 2022 for Complete Exam    Dr. Preciado [phone]

## 2021-04-15 NOTE — LETTER
4/15/2021         RE: Mami Dumont  [address]  [facility] [address]        Dear Colleague,    Thank you for referring your patient, Mami Dumont, to the [facility]. Please see a copy of my visit note below.     Current Eye Medications:  Opcon-A as needed both eyes for itching     Subjective:  Three month follow up for a pressure check. Vision is a little more blurry right eye. Vision is doing fine left eye in distance and near. No eye pain or discomfort in either eye.      Objective:  See Ophthalmology Exam.       Assessment:  Doing well status/post Kelman phacoemulsification/ posterior chamber lens left eye.      Plan:  Ok to continue Opcon-A as needed both eyes.  Use artificial tears up to 4 times daily both eyes as needed (Refresh Tears, Systane Ultra/Balance, or Theratears)   Possible clouding of posterior capsule left eye discussed.   Offered cataract surgery right eye at anytime.  Otherwise Call in December 2021 for an appointment in April 2022 for Complete Exam    Dr. Preciado [phone]             Again, thank you for allowing me to participate in the care of your patient.        Sincerely,        Alvin Preciado MD

## 2021-05-03 ENCOUNTER — TELEPHONE (OUTPATIENT)
Dept: OPHTHALMOLOGY | Facility: CLINIC | Age: 76
End: 2021-05-03

## 2021-05-03 NOTE — TELEPHONE ENCOUNTER
Patient called stating that her eyes have had a burning sensation with a little eye watering. She is wondering if it's allergies and if there is something that  can prescribe to help these symptoms. She would like a callback by a tech or Ilana himself at [phone] as soon as possible.    Thank You,    Raman Esposito  Patient Rep

## 2021-05-03 NOTE — TELEPHONE ENCOUNTER
Spoke to patient let her know Dr. Preciado said she could try Zaditor for her allergies. In her last visit note he also recommended Artificial tears up to 4 times daily (told patient to avoid anything that says redness relief or red out). Patient was happy with this.

## 2021-09-04 ENCOUNTER — HEALTH MAINTENANCE LETTER (OUTPATIENT)
Age: 76
End: 2021-09-04

## 2021-10-14 ENCOUNTER — TELEPHONE (OUTPATIENT)
Dept: DERMATOLOGY | Facility: CLINIC | Age: 76
End: 2021-10-14

## 2021-10-14 ENCOUNTER — OFFICE VISIT (OUTPATIENT)
Dept: DERMATOLOGY | Facility: CLINIC | Age: 76
End: 2021-10-14
Payer: COMMERCIAL

## 2021-10-14 VITALS — HEART RATE: 79 BPM | BODY MASS INDEX: 22.82 KG/M2 | HEIGHT: 59 IN | OXYGEN SATURATION: 100 %

## 2021-10-14 DIAGNOSIS — L81.4: ICD-10-CM

## 2021-10-14 DIAGNOSIS — L98.8 FACIAL RHYTIDS: Primary | ICD-10-CM

## 2021-10-14 PROCEDURE — 99204 OFFICE O/P NEW MOD 45 MIN: CPT | Performed by: PHYSICIAN ASSISTANT

## 2021-10-14 RX ORDER — TRETINOIN 0.25 MG/G
CREAM TOPICAL
Qty: 45 G | Refills: 11 | Status: SHIPPED | OUTPATIENT
Start: 2021-10-14 | End: 2022-07-28

## 2021-10-14 RX ORDER — HYDROQUINONE 40 MG/G
CREAM TOPICAL
Qty: 30 G | Refills: 5 | Status: SHIPPED | OUTPATIENT
Start: 2021-10-14 | End: 2022-07-07

## 2021-10-14 NOTE — PATIENT INSTRUCTIONS
AM  1. Wash  2. Apply a thin layer of hydroquinone to entire face  3. Moisturizer/makeup    PM  1. Wash  2. Apply a thin layer of hydroquinone to entire face  3. Apply a pea size of tretinoin cream to entire face (retinoid cream)  4. Moisturizer over

## 2021-10-14 NOTE — PROGRESS NOTES
"HPI:   Chief complaints: Mami Dumont is a pleasant 76 year old female who presents for evaluation of brown spots on the face and wrinkles. She notes her brown spots have actually improved with using an OTC retinoid on her face. She is most bothered by the wrinkles around her mouth and her forehead.   Shx; Her grandson is getting  in January     PHYSICAL EXAM:    Pulse 79   Ht 1.499 m (4' 11\")   SpO2 100%   BMI 22.82 kg/m    Skin exam performed as follows: Type 2 skin. Mood appropriate  Alert and Oriented X 3. Well developed, well nourished in no distress.  General appearance: Normal  Head including face: Normal  Eyes: conjunctiva and lids: Normal  Mouth: Lips, teeth, gums: Normal  Neck: Normal  Cardiovascular: Exam of peripheral vascular system by observation for swelling, varicosities, edema: Normal  Right upper extremity: Normal  Left upper extremity: Normal  Right lower extremity: Normal  Left lower extremity: Normal  Skin: Scalp and body hair: See below    Age appropriate rhytids of glabella; vertical rhytids around the mouth  Brown and tan macules on the cheeks    ASSESSMENT/PLAN:     1. Facial rhytids and lentigos  --Start tretinoin 0.025% cream every day  --Start hydroquinone 4% cream BID x 4-6 months then stop  2. Discussed Botox for glabellar and vertical lip lines. Treatment, duration of 3 months, cosmetic cost of $12 per unit discussed at length with patient.           Follow-up: PRN  CC:   Scribed By: Pamela Michelle, MS, PA-C      "

## 2021-10-14 NOTE — NURSING NOTE
"Chief Complaint   Patient presents with     Derm Problem     wrinkles       Vitals:    10/14/21 1016   Pulse: 79   SpO2: 100%   Height: 1.499 m (4' 11\")     Wt Readings from Last 1 Encounters:   03/29/21 51.3 kg (113 lb)       Chetna Manzanares LPN.................10/14/2021    "

## 2021-10-14 NOTE — TELEPHONE ENCOUNTER
PRIOR AUTHORIZATION DENIED - COMPLETED VIA EPA     Medication: tretinoin (RETIN-A) 0.025 % external cream - DENIED     Denial Date:  10/14/2021    Denial Rational: This medication is not covered for Cosmetic Indications     Appeal Information: [facility] APPEALS DEPARTMENT - Phone: [phone], Fax: [phone]

## 2021-10-14 NOTE — LETTER
"    10/14/2021         RE: Mami Dumont  [address]  [place] [address]        Dear Colleague,    Thank you for referring your patient, Mami Dumont, to the [facility]. Please see a copy of my visit note below.    HPI:   Chief complaints: Mami Dumont is a pleasant 76 year old female who presents for evaluation of brown spots on the face and wrinkles. She notes her brown spots have actually improved with using an OTC retinoid on her face. She is most bothered by the wrinkles around her mouth and her forehead.   Shx; Her grandson is getting  in January     PHYSICAL EXAM:    Pulse 79   Ht 1.499 m (4' 11\")   SpO2 100%   BMI 22.82 kg/m    Skin exam performed as follows: Type 2 skin. Mood appropriate  Alert and Oriented X 3. Well developed, well nourished in no distress.  General appearance: Normal  Head including face: Normal  Eyes: conjunctiva and lids: Normal  Mouth: Lips, teeth, gums: Normal  Neck: Normal  Cardiovascular: Exam of peripheral vascular system by observation for swelling, varicosities, edema: Normal  Right upper extremity: Normal  Left upper extremity: Normal  Right lower extremity: Normal  Left lower extremity: Normal  Skin: Scalp and body hair: See below    Age appropriate rhytids of glabella; vertical rhytids around the mouth  Brown and tan macules on the cheeks    ASSESSMENT/PLAN:     1. Facial rhytids and lentigos  --Start tretinoin 0.025% cream every day  --Start hydroquinone 4% cream BID x 4-6 months then stop  2. Discussed Botox for glabellar and vertical lip lines. Treatment, duration of 3 months, cosmetic cost of $12 per unit discussed at length with patient.           Follow-up: PRN  CC:   Scribed By: Pamela Michelle, MS, PAMayeC          Again, thank you for allowing me to participate in the care of your patient.        Sincerely,        Pamela Michelle PA-C    "

## 2021-10-18 ENCOUNTER — TELEPHONE (OUTPATIENT)
Dept: DERMATOLOGY | Facility: CLINIC | Age: 76
End: 2021-10-18

## 2021-10-18 NOTE — TELEPHONE ENCOUNTER
[facility] Call Center    Phone Message    May a detailed message be left on voicemail: yes     Reason for Call: Appointment Intake    Referring Provider Name: NA  Diagnosis and/or Symptoms: Pt has already spoke to Viviana about Botox around her mouth for wrinkles. Pt would like to schedule for procedure and was told the beginning on Dec. Please call Pt back to discuss a schedule. Pt does not use her MyChart so please only call Pt. Thank you    Action Taken: Message routed to:  [facility] Derm    Travel Screening: Not Applicable

## 2021-10-21 NOTE — TELEPHONE ENCOUNTER
Spoke with Pamela Michelle PA-C  Patient can be scheduled 12/20 or 12/16 for most effective treatment.

## 2021-10-25 ENCOUNTER — TELEPHONE (OUTPATIENT)
Dept: DERMATOLOGY | Facility: CLINIC | Age: 76
End: 2021-10-25

## 2021-10-25 ENCOUNTER — TELEPHONE (OUTPATIENT)
Dept: DERMATOLOGY | Facility: CLINIC | Age: 76
End: 2021-10-25
Payer: COMMERCIAL

## 2021-10-25 NOTE — TELEPHONE ENCOUNTER
[facility] Call [facility]    Phone Message    May a detailed message be left on voicemail: yes     Reason for Call: Other: Pt called regarding below. I was unable to connect the pt. Please call her back to schedule. Thanks     Action Taken: Message routed to:  Other: [facility]    Travel Screening: Not Applicable

## 2021-10-25 NOTE — TELEPHONE ENCOUNTER
Left patient voicemail to call and schedule botox appointment with Pamela Michelle in December.    Thank You,    Faisal Heard  Admission

## 2021-10-25 NOTE — TELEPHONE ENCOUNTER
[facility] Call Center    Phone Message    May a detailed message be left on voicemail: yes     Reason for Call: Appointment Intake    Referring Provider Name:   Pt of Pamela Michelle    Diagnosis and/or Symptoms:   Botox around mouth, consultation complete    Action Taken: Other: [facility]    Travel Screening: Not Applicable    Pt would like to schedule Botox injections with Pamela Michelle in December. Please call Pt to schedule. Thanks!

## 2021-10-26 NOTE — TELEPHONE ENCOUNTER
[facility] Call [facility]    Phone Message    May a detailed message be left on voicemail: yes     Reason for Call: Appointment Intake    Referring Provider Name: NA  Diagnosis and/or Symptoms: Pt would like to schedule for Botox. I see a lot of phone tag going on, so I called back line and was on hold for over 3 mins or more. Pt already had consult. Protocols state to send encounters for Botox Appt's . Please call Pt back. Thank you    Action Taken: Message routed to:  [facility] Derm    Travel Screening: Not Applicable

## 2021-10-26 NOTE — TELEPHONE ENCOUNTER
Patient notified. Patient verbalized understanding. She stated she already paid out of pocket for the medication.    Mary Pimentel RN

## 2021-11-09 ENCOUNTER — NURSE TRIAGE (OUTPATIENT)
Dept: NURSING | Facility: CLINIC | Age: 76
End: 2021-11-09
Payer: COMMERCIAL

## 2021-11-09 NOTE — TELEPHONE ENCOUNTER
"Patient calling reporting she was at a shower for her Granddaughter this weekend who tested positive for COVID today. Wondering about COVID testing. Patient has been fully vaccinated. Advised per protocol to have COVID test completed 3-5 days after exposure with patient agreeable. Home care advice provided per protocol. Call back instructions provided. Patient agreeable to plan.     Ana Meyers RN 11/09/21 1:09 PM    Health Triage Nurse Advisor      Reason for Disposition    COVID-19 vaccine, fully vaccinated, and exposure to COVID-19, Frequently Asked Questions (FAQs)    Additional Information    Negative: [1] Difficulty breathing or swallowing AND [2] starts within 2 hours after injection    Negative: Sounds like a life-threatening emergency to the triager    Negative: [1] Symptoms of COVID-19 (e.g., cough, fever, SOB, or others) AND [2] within 14 days of EXPOSURE (close contact) with diagnosed or suspected COVID-19 patient    Negative: [1] Symptoms of COVID-19 (e.g., cough, fever, SOB, or others) AND [2] within 14 days of being at a crowded indoor or outdoor event (e.g., concert, festival, rally, wedding)    Negative: Typical COVID-19 symptoms (e.g., cough, difficulty breathing, loss of taste or smell, runny nose, sore throat) that are NOT expected from vaccine    Negative: [1] COVID-19 exposure AND [2] no symptoms, or symptoms not typical of COVID-19    Negative: Fever > 104 F (40 C)    Negative: Sounds like a severe, unusual reaction to the triager    Negative: COVID-19 vaccine, fully vaccinated, and \"What can I do now?\", Frequently Asked Questions (FAQs)    Negative: COVID-19 vaccine, Frequently Asked Questions (FAQs)    Negative: COVID-19 vaccine, systemic reactions (e.g., fatigue, fever, muscle aches), questions about    Negative: COVID-19 vaccine, injection site reaction (e.g., pain, redness, swelling), question about    Negative: [1] Requesting COVID-19 vaccine AND [2] healthcare worker (e.g., EMS first " responders, doctors, nurses)    Negative: [1] Requesting COVID-19 vaccine AND [2] resident of a long-term care facility (e.g., nursing home)    Negative: [1] Requesting COVID-19 vaccine AND [2] vaccine available in the community for this patient group    Negative: [1] Pain, tenderness, or swelling at the injection site AND [2] lasts > 7 days    Negative: [1] Lymph node swelling (i.e., armpit or neck on side of vaccine) AND [2] lasts > 3 weeks    Negative: [1] Fever > 100.0 F (37.8 C) AND [2] healthcare worker    Negative: [1] Redness around the injection site AND [2] started > 48 hours after getting vaccine AND [3] no fever  (Exception: red area < 1 inch or 2.5 cm wide)    Negative: [1] Pain, tenderness, or swelling at the injection site AND [2] over 3 days (72 hours) since vaccine AND [3] getting worse    Negative: Fever > 100.0 F (37.8 C) present > 3 days (72 hours)    Negative: [1] Redness or red streak around the injection site AND [2] started > 48 hours after getting vaccine AND [3] fever    Negative: [1] Fever > 101 F (38.3 C) AND [2] age > 60 years AND [3] started > 48 hours after getting vaccine    Negative: [1] Fever > 100.0 F (37.8 C) AND [2] bedridden (e.g., nursing home patient, CVA, chronic illness, recovering from surgery) AND [3] started > 48 hours after getting vaccine    Negative: [1] Fever > 100.0 F (37.8 C) AND [2] diabetes mellitus or weak immune system (e.g., HIV positive, cancer chemo, splenectomy, organ transplant, chronic steroids) AND [3] started > 48 hours after getting vaccine    Protocols used: CORONAVIRUS (COVID-19) VACCINE QUESTIONS AND HHYVLENFT-V-RJ 8.25.2021

## 2021-11-10 ENCOUNTER — VIRTUAL VISIT (OUTPATIENT)
Dept: FAMILY MEDICINE | Facility: CLINIC | Age: 76
End: 2021-11-10
Payer: COMMERCIAL

## 2021-11-10 DIAGNOSIS — Z20.822 EXPOSURE TO 2019 NOVEL CORONAVIRUS: Primary | ICD-10-CM

## 2021-11-10 PROCEDURE — 99441 PR PHYSICIAN TELEPHONE EVALUATION 5-10 MIN: CPT | Mod: 95 | Performed by: INTERNAL MEDICINE

## 2021-11-10 NOTE — NURSING NOTE
Left message for patient to call #[phone] to schedule Covid testing as ordered by Dr. Mondragon.    Jil Shaffer MA

## 2021-11-10 NOTE — PROGRESS NOTES
Mireya is a 76 year old who is being evaluated via a billable telephone visit.      What phone number would you like to be contacted at? [phone]  How would you like to obtain your AVS? MyChart    Assessment & Plan     Exposure to 2019 novel coronavirus  Asymptomatic.  Exposure 4 days ago  - Asymptomatic COVID-19 Virus (Coronavirus) by PCR; Future             See Patient Instructions    No follow-ups on file.    Derrick Mondragon MD  [facility]   Mireya is a 76 year old who presents for the following health issues     HPI 76-year-old exposed to Covid Saturday.  She was immunized and late March    No symptomatology noted no shortness of breath.  No cough.  No upper respiratory symptoms.  No intestinal symptoms    Exposed to COVID on saturday    Review of Systems   No symptoms      Objective           Vitals:  No vitals were obtained today due to virtual visit.    Physical Exam   healthy, alert and no distress  PSYCH: Alert and oriented times 3; coherent speech, normal   rate and volume, able to articulate logical thoughts, able   to abstract reason, no tangential thoughts, no hallucinations   or delusions  Her affect is normal  RESP: No cough, no audible wheezing, able to talk in full sentences  Remainder of exam unable to be completed due to telephone visits    Office Visit on 12/15/2020   Component Date Value Ref Range Status     Potassium 12/15/2020 4.4  3.4 - 5.3 mmol/L Final     Creatinine 12/15/2020 0.80  0.52 - 1.04 mg/dL Final     GFR Estimate 12/15/2020 72  >60 mL/min/[1.73_m2] Final    Comment: Non  GFR Calc  Starting 12/18/2018, serum creatinine based estimated GFR (eGFR) will be   calculated using the Chronic Kidney Disease Epidemiology Collaboration   (CKD-EPI) equation.       GFR Estimate If Black 12/15/2020 84  >60 mL/min/[1.73_m2] Final    Comment:  GFR Calc  Starting 12/18/2018, serum creatinine based estimated GFR (eGFR) will be    calculated using the Chronic Kidney Disease Epidemiology Collaboration   (CKD-EPI) equation.       WBC 12/15/2020 10.0  4.0 - 11.0 10e9/L Final     RBC Count 12/15/2020 4.29  3.8 - 5.2 10e12/L Final     Hemoglobin 12/15/2020 13.3  11.7 - 15.7 g/dL Final     Hematocrit 12/15/2020 39.6  35.0 - 47.0 % Final     MCV 12/15/2020 92  78 - 100 fl Final     MCH 12/15/2020 31.0  26.5 - 33.0 pg Final     MCHC 12/15/2020 33.6  31.5 - 36.5 g/dL Final     RDW 12/15/2020 13.6  10.0 - 15.0 % Final     Platelet Count 12/15/2020 319  150 - 450 10e9/L Final     % Neutrophils 12/15/2020 64.4  % Final     % Lymphocytes 12/15/2020 23.0  % Final     % Monocytes 12/15/2020 8.5  % Final     % Eosinophils 12/15/2020 3.6  % Final     % Basophils 12/15/2020 0.5  % Final     Absolute Neutrophil 12/15/2020 6.4  1.6 - 8.3 10e9/L Final     Absolute Lymphocytes 12/15/2020 2.3  0.8 - 5.3 10e9/L Final     Absolute Monocytes 12/15/2020 0.9  0.0 - 1.3 10e9/L Final     Absolute Eosinophils 12/15/2020 0.4  0.0 - 0.7 10e9/L Final     Absolute Basophils 12/15/2020 0.1  0.0 - 0.2 10e9/L Final     Diff Method 12/15/2020 Automated Method   Final               Phone call duration:5 minutes

## 2021-11-17 DIAGNOSIS — I10 BENIGN ESSENTIAL HYPERTENSION: ICD-10-CM

## 2021-11-17 RX ORDER — AMLODIPINE BESYLATE 5 MG/1
5 TABLET ORAL DAILY
Qty: 90 TABLET | Refills: 0 | Status: SHIPPED | OUTPATIENT
Start: 2021-11-17 | End: 2022-02-23

## 2021-11-17 NOTE — TELEPHONE ENCOUNTER
Patient calling requesting a refill of the amLODIPine (NORVASC) 5 MG tablet. She is out now, [facility] states they have been sending fax requests over.  Namita Pedroza

## 2021-12-16 ENCOUNTER — TELEPHONE (OUTPATIENT)
Dept: DERMATOLOGY | Facility: CLINIC | Age: 76
End: 2021-12-16
Payer: COMMERCIAL

## 2021-12-16 DIAGNOSIS — L98.8 FACIAL RHYTIDS: ICD-10-CM

## 2021-12-16 DIAGNOSIS — L81.4: ICD-10-CM

## 2021-12-16 RX ORDER — HYDROQUINONE 40 MG/G
CREAM TOPICAL
Qty: 30 G | Refills: 5 | Status: CANCELLED | OUTPATIENT
Start: 2021-12-16

## 2021-12-16 NOTE — TELEPHONE ENCOUNTER
Pt would like another hard script written for her for Hydroquinone. She will be in clinic on Monday for Botox.   Cyndi VALLEJO RN BSN PHN  [facility]

## 2021-12-16 NOTE — TELEPHONE ENCOUNTER
Yes of course she can. She does have 5 refills on the prescription I wrote for her - did she go through these already?    Can I print this for her when I see her or did she want to pick it up today?

## 2021-12-16 NOTE — TELEPHONE ENCOUNTER
[facility] Call Center    Phone Message    May a detailed message be left on voicemail: yes     Reason for Call: Other: Pt called and wanted to know if Pamela can print another hard copy of the rx for this medication for the pt. Pt misplace the first copy. Pt will be coming in on Monday for botox. Please call her back if you have any questions. Thanks     Action Taken: Message routed to:  [facility] [facility]    Travel Screening: Not Applicable

## 2021-12-20 ENCOUNTER — OFFICE VISIT (OUTPATIENT)
Dept: DERMATOLOGY | Facility: CLINIC | Age: 76
End: 2021-12-20
Payer: COMMERCIAL

## 2021-12-20 DIAGNOSIS — Z41.1 ELECTIVE PROCEDURE FOR UNACCEPTABLE COSMETIC APPEARANCE: Primary | ICD-10-CM

## 2021-12-20 PROCEDURE — 96999 UNLISTED SPEC DERM SVC/PX: CPT | Performed by: PHYSICIAN ASSISTANT

## 2021-12-20 PROCEDURE — 99207 PR DROP WITH A PROCEDURE: CPT | Performed by: PHYSICIAN ASSISTANT

## 2021-12-20 NOTE — LETTER
12/20/2021         RE: Mami Dumont  [address]  [facility] [address]        Dear Colleague,    Thank you for referring your patient, Mami Dumont, to the [facility]. Please see a copy of my visit note below.    HPI:   Chief complaints: Mami Dumont is a pleasant 76 year old female who presents for cosmetic botox. She is most bothered by her lips and glabellar wrinkles but would like to address her vertical lip lines today. This is her first treatment with Botox.       PHYSICAL EXAM:    There were no vitals taken for this visit.  Skin exam performed as follows: Type 2 skin. Mood appropriate  Alert and Oriented X 3. Well developed, well nourished in no distress.  General appearance: Normal  Head including face: Normal  Eyes: conjunctiva and lids: Normal  Mouth: Lips, teeth, gums: Normal  Neck: Normal  Cardiovascular: Exam of peripheral vascular system by observation for swelling, varicosities, edema: Normal  Right upper extremity: Normal  Left upper extremity: Normal  Right lower extremity: Normal  Left lower extremity: Normal  Skin: Scalp and body hair: See below    Age appropriate facial rhytids    ASSESSMENT/PLAN:     BOTOX:  PGACAC discussed.  Risks including but not limited to injection site reaction, bruising, asymmetry, no resolution of rhytides, brow ptosis, dry mouth, tiredness, and headache.  Also label warnings are difficulty with swallowing, speaking, breathing, muscle weakness, loss of bladder control, and double vision/blurred vision.     Botox-A in concentration of 2.5u/0.1cc was injected IM to upper lip and lowe lip.  Total injected was 8 units to the upper lip and 6 units for a total of 14 units.  Patient tolerated without complications and given wound care instructions, including not to move product around.       Total cost: $168      Follow-up: PRN  CC:   Scribed By: Pamela Michelle, MS, PA-C          Again, thank you for allowing me to  participate in the care of your patient.        Sincerely,        Pamela Hu PA-C

## 2021-12-20 NOTE — PROGRESS NOTES
HPI:   Chief complaints: Mami Dumont is a pleasant 76 year old female who presents for cosmetic botox. She is most bothered by her lips and glabellar wrinkles but would like to address her vertical lip lines today. This is her first treatment with Botox.       PHYSICAL EXAM:    There were no vitals taken for this visit.  Skin exam performed as follows: Type 2 skin. Mood appropriate  Alert and Oriented X 3. Well developed, well nourished in no distress.  General appearance: Normal  Head including face: Normal  Eyes: conjunctiva and lids: Normal  Mouth: Lips, teeth, gums: Normal  Neck: Normal  Cardiovascular: Exam of peripheral vascular system by observation for swelling, varicosities, edema: Normal  Right upper extremity: Normal  Left upper extremity: Normal  Right lower extremity: Normal  Left lower extremity: Normal  Skin: Scalp and body hair: See below    Age appropriate facial rhytids    ASSESSMENT/PLAN:     BOTOX:  PGACAC discussed.  Risks including but not limited to injection site reaction, bruising, asymmetry, no resolution of rhytides, brow ptosis, dry mouth, tiredness, and headache.  Also label warnings are difficulty with swallowing, speaking, breathing, muscle weakness, loss of bladder control, and double vision/blurred vision.     Botox-A in concentration of 2.5u/0.1cc was injected IM to upper lip and lowe lip.  Total injected was 8 units to the upper lip and 6 units for a total of 14 units.  Patient tolerated without complications and given wound care instructions, including not to move product around.       Lot: [id number]  Exp: 04/2022    Total cost: $168      Follow-up: PRN  CC:   Scribed By: Pamela Michelle MS, PAGALO

## 2022-02-23 DIAGNOSIS — I10 BENIGN ESSENTIAL HYPERTENSION: ICD-10-CM

## 2022-02-23 RX ORDER — AMLODIPINE BESYLATE 5 MG/1
5 TABLET ORAL DAILY
Qty: 90 TABLET | Refills: 0 | OUTPATIENT
Start: 2022-02-23

## 2022-02-23 RX ORDER — AMLODIPINE BESYLATE 5 MG/1
5 TABLET ORAL DAILY
Qty: 30 TABLET | Refills: 0 | Status: SHIPPED | OUTPATIENT
Start: 2022-02-23 | End: 2022-04-25

## 2022-02-23 NOTE — TELEPHONE ENCOUNTER
Medication amlodipine    Called and spoke to patient, appointment made for 3/8/22. Can you please refill this until she can come in.Jada Hawkins MA/TC

## 2022-02-23 NOTE — TELEPHONE ENCOUNTER
Routing refill request to provider for review/approval because:  Labs not current:    Creatinine   Date Value Ref Range Status   12/15/2020 0.80 0.52 - 1.04 mg/dL Final     Tanya Das RN

## 2022-03-11 ENCOUNTER — TELEPHONE (OUTPATIENT)
Dept: FAMILY MEDICINE | Facility: CLINIC | Age: 77
End: 2022-03-11

## 2022-03-11 NOTE — TELEPHONE ENCOUNTER
I spoke to the patient and I scheduled a video visit for her today 3/11/22.  Elaine Cantrell,  [facility]

## 2022-03-11 NOTE — TELEPHONE ENCOUNTER
Reason for call:  Patient reporting a symptom    Symptom or request: Patient requesting an oitment for a stye on her eye, says she has recieved it before but didn't know name    Duration (how long have symptoms been present): week    Have you been treated for this before? Yes    Additional comments: has been compressing, no relief     Phone Number patient can be reached at:  Cell number on file:    Telephone Information:   Mobile [phone]       Best Time:  Any    Can we leave a detailed message on this number:  YES    Call taken on 3/11/2022 at 8:21 AM by Cody South

## 2022-03-17 ENCOUNTER — OFFICE VISIT (OUTPATIENT)
Dept: FAMILY MEDICINE | Facility: CLINIC | Age: 77
End: 2022-03-17
Payer: COMMERCIAL

## 2022-03-17 VITALS
HEIGHT: 59 IN | TEMPERATURE: 97.7 F | RESPIRATION RATE: 16 BRPM | SYSTOLIC BLOOD PRESSURE: 136 MMHG | HEART RATE: 74 BPM | WEIGHT: 111 LBS | BODY MASS INDEX: 22.38 KG/M2 | DIASTOLIC BLOOD PRESSURE: 72 MMHG | OXYGEN SATURATION: 100 %

## 2022-03-17 DIAGNOSIS — Z13.220 SCREENING FOR HYPERLIPIDEMIA: ICD-10-CM

## 2022-03-17 DIAGNOSIS — R09.89 BRUIT OF LEFT CAROTID ARTERY: ICD-10-CM

## 2022-03-17 DIAGNOSIS — J30.2 SEASONAL ALLERGIC RHINITIS, UNSPECIFIED TRIGGER: ICD-10-CM

## 2022-03-17 DIAGNOSIS — I10 BENIGN ESSENTIAL HYPERTENSION: Primary | ICD-10-CM

## 2022-03-17 DIAGNOSIS — I65.22 LEFT CAROTID ARTERY STENOSIS: ICD-10-CM

## 2022-03-17 PROCEDURE — 99214 OFFICE O/P EST MOD 30 MIN: CPT | Performed by: FAMILY MEDICINE

## 2022-03-17 RX ORDER — AZELASTINE 1 MG/ML
1 SPRAY, METERED NASAL 2 TIMES DAILY
Qty: 30 ML | Refills: 1 | Status: SHIPPED | OUTPATIENT
Start: 2022-03-17

## 2022-03-17 NOTE — PROGRESS NOTES
Assessment & Plan     Benign essential hypertension  Blood pressure is well controlled  Continue the same medication Norvasc 5 mg daily.  She takes propranolol as needed for tremor  - Basic metabolic panel  (Ca, Cl, CO2, Creat, Gluc, K, Na, BUN); Future    Bruit of left carotid artery  Noted on exam today.  She denied any symptoms.  No facial numbness, weakness or slurred speech  She is concerned about memory issue.  Discussed starting statin Crestor 10 mg, she is going to think about it  - US Carotid Bilateral; Future    Seasonal allergic rhinitis, unspecified trigger  Chronic problem  Taking Flonase, Claritin-D  She is wondering if there is something else that can help  Trial of Astelin  - azelastine (ASTELIN) 0.1 % nasal spray; Spray 1 spray into both nostrils 2 times daily    Screening for hyperlipidemia  She will schedule fasting lipid profile  - Lipid panel reflex to direct LDL Fasting; Future                 Return in about 3 months (around 6/17/2022), or if symptoms worsen or fail to improve, for Follow up, Routine preventive.    Bijan Atkinson MD  [facility]   Mireya is a 76 year old who presents for the following health issues  accompanied by her Self.    History of Present Illness       Reason for visit:  Checkup    She eats 2-3 servings of fruits and vegetables daily.She consumes 0 sweetened beverage(s) daily. She exercises with enough effort to increase her heart rate 3 or less days per week.   She is taking medications regularly.       Hypertension Follow-up      Do you check your blood pressure regularly outside of the clinic? Yes     Are you following a low salt diet? Yes    Are your blood pressures ever more than 140 on the top number (systolic) OR more   than 90 on the bottom number (diastolic), for example 140/90? No    Wt Readings from Last 4 Encounters:   03/17/22 50.3 kg (111 lb)   03/29/21 51.3 kg (113 lb)   12/15/20 51.5 kg (113 lb 8 oz)   07/09/20 51.3  "kg (113 lb)       Review of Systems   Constitutional, HEENT, cardiovascular, pulmonary, gi and gu systems are negative, except as otherwise noted.      Objective    /72   Pulse 74   Temp 97.7  F (36.5  C) (Tympanic)   Resp 16   Ht 1.499 m (4' 11\")   Wt 50.3 kg (111 lb)   SpO2 100%   Breastfeeding No   BMI 22.42 kg/m    Body mass index is 22.42 kg/m .  Physical Exam  Vitals and nursing note reviewed.   HENT:      Head: Normocephalic and atraumatic.   Neck:      Vascular: Carotid bruit present.      Comments: Left carotid bruit  Cardiovascular:      Rate and Rhythm: Normal rate and regular rhythm.      Pulses: Normal pulses.      Heart sounds: Normal heart sounds. No murmur heard.    No friction rub. No gallop.   Pulmonary:      Effort: Pulmonary effort is normal. No respiratory distress.      Breath sounds: Normal breath sounds. No stridor. No wheezing, rhonchi or rales.   Chest:      Chest wall: No tenderness.   Abdominal:      General: Abdomen is flat.   Musculoskeletal:      Cervical back: Normal range of motion and neck supple. No rigidity.   Lymphadenopathy:      Cervical: No cervical adenopathy.                          "

## 2022-04-04 ENCOUNTER — ANCILLARY PROCEDURE (OUTPATIENT)
Dept: ULTRASOUND IMAGING | Facility: CLINIC | Age: 77
End: 2022-04-04
Attending: FAMILY MEDICINE
Payer: COMMERCIAL

## 2022-04-04 DIAGNOSIS — R09.89 BRUIT OF LEFT CAROTID ARTERY: ICD-10-CM

## 2022-04-04 PROCEDURE — 93880 EXTRACRANIAL BILAT STUDY: CPT | Performed by: RADIOLOGY

## 2022-04-05 ENCOUNTER — TELEPHONE (OUTPATIENT)
Dept: OTHER | Facility: CLINIC | Age: 77
End: 2022-04-05
Payer: COMMERCIAL

## 2022-04-05 NOTE — TELEPHONE ENCOUNTER
Pt referred to [facility] by Bijan Atkinson MD for bilateral carotid artery stenosis.    Bilateral carotid US in Epic.    Pt needs to be scheduled for consult with Vascular Medicine.  Will route to scheduling to coordinate an appointment at next available.    Appt note: Ref by Dr. Bijan Atkinson for bilateral carotid artery stenosis; notes and imaging in Epic.    Negra Vernon, TANKN, RN-[facility]

## 2022-04-14 ENCOUNTER — LAB (OUTPATIENT)
Dept: LAB | Facility: CLINIC | Age: 77
End: 2022-04-14
Payer: COMMERCIAL

## 2022-04-14 DIAGNOSIS — I10 BENIGN ESSENTIAL HYPERTENSION: ICD-10-CM

## 2022-04-14 DIAGNOSIS — Z13.220 SCREENING FOR HYPERLIPIDEMIA: ICD-10-CM

## 2022-04-14 LAB
ANION GAP SERPL CALCULATED.3IONS-SCNC: 7 MMOL/L (ref 3–14)
BUN SERPL-MCNC: 10 MG/DL (ref 7–30)
CALCIUM SERPL-MCNC: 9.2 MG/DL (ref 8.5–10.1)
CHLORIDE BLD-SCNC: 101 MMOL/L (ref 94–109)
CHOLEST SERPL-MCNC: 243 MG/DL
CO2 SERPL-SCNC: 26 MMOL/L (ref 20–32)
CREAT SERPL-MCNC: 0.89 MG/DL (ref 0.52–1.04)
FASTING STATUS PATIENT QL REPORTED: YES
GFR SERPL CREATININE-BSD FRML MDRD: 67 ML/MIN/1.73M2
GLUCOSE BLD-MCNC: 96 MG/DL (ref 70–99)
HDLC SERPL-MCNC: 76 MG/DL
LDLC SERPL CALC-MCNC: 139 MG/DL
NONHDLC SERPL-MCNC: 167 MG/DL
POTASSIUM BLD-SCNC: 4.2 MMOL/L (ref 3.4–5.3)
SODIUM SERPL-SCNC: 134 MMOL/L (ref 133–144)
TRIGL SERPL-MCNC: 140 MG/DL

## 2022-04-14 PROCEDURE — 80048 BASIC METABOLIC PNL TOTAL CA: CPT

## 2022-04-14 PROCEDURE — 36415 COLL VENOUS BLD VENIPUNCTURE: CPT

## 2022-04-14 PROCEDURE — 80061 LIPID PANEL: CPT

## 2022-04-16 ENCOUNTER — HEALTH MAINTENANCE LETTER (OUTPATIENT)
Age: 77
End: 2022-04-16

## 2022-04-25 ENCOUNTER — OFFICE VISIT (OUTPATIENT)
Dept: OTHER | Facility: CLINIC | Age: 77
End: 2022-04-25
Attending: FAMILY MEDICINE
Payer: COMMERCIAL

## 2022-04-25 ENCOUNTER — TELEPHONE (OUTPATIENT)
Dept: OTHER | Facility: CLINIC | Age: 77
End: 2022-04-25

## 2022-04-25 VITALS
HEART RATE: 71 BPM | OXYGEN SATURATION: 70 % | WEIGHT: 111 LBS | DIASTOLIC BLOOD PRESSURE: 80 MMHG | BODY MASS INDEX: 21.79 KG/M2 | HEIGHT: 60 IN | SYSTOLIC BLOOD PRESSURE: 177 MMHG

## 2022-04-25 DIAGNOSIS — I10 BENIGN ESSENTIAL HYPERTENSION: ICD-10-CM

## 2022-04-25 DIAGNOSIS — I65.22 LEFT CAROTID ARTERY STENOSIS: ICD-10-CM

## 2022-04-25 DIAGNOSIS — R09.89 BRUIT OF LEFT CAROTID ARTERY: ICD-10-CM

## 2022-04-25 PROCEDURE — 99204 OFFICE O/P NEW MOD 45 MIN: CPT | Performed by: INTERNAL MEDICINE

## 2022-04-25 PROCEDURE — G0463 HOSPITAL OUTPT CLINIC VISIT: HCPCS

## 2022-04-25 RX ORDER — ROSUVASTATIN CALCIUM 10 MG/1
10 TABLET, COATED ORAL DAILY
Qty: 30 TABLET | Refills: 4 | Status: SHIPPED | OUTPATIENT
Start: 2022-04-25 | End: 2022-04-26

## 2022-04-25 RX ORDER — AMLODIPINE BESYLATE 10 MG/1
10 TABLET ORAL DAILY
Qty: 30 TABLET | Refills: 4 | Status: SHIPPED | OUTPATIENT
Start: 2022-04-25 | End: 2022-04-26

## 2022-04-25 NOTE — PROGRESS NOTES
[facility]  González Brunner MD - Vascular Medicine Progress Note    LOCATION: [facility]    Mami Dumont  Medical Record #:  [id number]  YOB: 1945  Age:  77 year old     Date of Service: 4/25/2022     PRIMARY CARE PROVIDER: [facility]    REASON FOR VISIT:  evaluation for carotid artery stenosis    IMPRESSION: Patient is completely asymptomatic, no visual symptoms, speech symptoms, weakness or numbness on either side of the body  No history of TIAs, no history of stroke in the past  Carotid Doppler arterial ultrasound showed less than 50% stenosis on the right side and 50-69 stenosis with heavy plaque calcification on the left internal carotid artery in addition to severe external carotid artery stenosis on the left side    Patient blood pressure is not at target, patient LDL is elevated with elevated total cholesterol ,the patient is on no statins  Carotid bruit is heard and it is louder on the left side    RECOMMENDATION:  1- vascular risk factors modifications mainly controlling LDL to be less than 100, blood pressure 130/80 or less  2-CTA of the head and neck as I think the degree of stenosis is underestimated on the left side  3- keep a log of blood pressure yet I went ahead and increase her Norvasc to 10 mg daily and I added Crestor 10 mg daily to her regimen  4- carotid Doppler arterial ultrasound in 1 year from now based on results of CTA degree of stenosis    If patients imaging is normal patient should follow up 1 year from now    HPI: Mami Dumont is a 77 year old female who was seen today for evaluation of carotid arterial ultrasound stenosis, patient is asymptomatic, no focal neurological signs or symptoms.      PAST MEDICAL HISTORY  Past Medical History:   Diagnosis Date     Allergic rhinitis due to animal dander 1/99 skin tests: pos. for cat/DM/M/RW per Dr. Shah.           Coronary artery disease due to calcified coronary  lesion 7/9/2017     Diagnostic skin and sensitization tests     1/99 skin tests: pos. for cat/DM/M/RW per Dr. Shah.      House dust mite allergy      Hypertension goal BP (blood pressure) < 140/90 9/10/2013     Open angle with borderline findings, low risk 8/29/2013     Rhinitis, allergic to other allergen      Seasonal allergic conjunctivitis      Seasonal allergic rhinitis     hx of elevated BP on Sudafed, Pt told to discontinue Sudafed products by Dr. Taylor on 3/1/10 visit.      Tubular adenoma of colon 5/2013    colonoscopy due 5/2018       PAST SURGICAL HISTORY:  Past Surgical History:   Procedure Laterality Date     BLEPHAROPLASTY, BROW LIFT BILATERAL, COMBINED  05/02/2013    with snip punctoplasty,[place]     BLEPHAROPLASTY, BROW LIFT BILATERAL, COMBINED Bilateral 05/19/2017    Procedure: COMBINED BLEPHAROPLASTY, BROW LIFT BILATERAL;  BILATERAL UPPER LID BLEPHAROPLASTY WITH BROW PTOSIS ;  Surgeon: Faisal Alicea MD;  Location:  [facility]     ENDOSCOPIC POLYPECTOMY NASAL  1960    nasal polyp removal     PHACOEMULSIFICATION WITH STANDARD INTRAOCULAR LENS IMPLANT Left 12/28/2020         CURRENT MEDICATIONS  azelastine (ASTELIN) 0.1 % nasal spray, Spray 1 spray into both nostrils 2 times daily (Patient taking differently: Spray 1 spray into both nostrils 2 times daily As needed)  FLONASE 50 MCG/ACT nasal spray, Spray 2 sprays in nostril daily Brand necessary. Patient gets headache with generic.  Hypertonic Nasal Wash (SINUS RINSE BOTTLE KIT NA), Spray 1 Bottle in nostril as needed.  loratadine-pseudoePHEDrine (CLARITIN-D 12-HOUR) 5-120 MG 12 hr tablet, Take 1 tablet by mouth as needed Taking less than prior due to hypertension.  methocarbamol (ROBAXIN) 500 MG tablet, Take 1 tablet (500 mg) by mouth 4 times daily as needed for muscle spasms  Multiple Vitamins-Minerals (CENTRUM PO), Take  by mouth. Takes 1 daily  naphazoline-pheniramine (NAPHCON A) SOLN ophthalmic solution, Place 1-2 drops into both eyes as  needed  naproxen (NAPROSYN) 500 MG tablet, Take 500 mg by mouth 2 times daily (with meals).  propranolol HCl 60 MG TABS, Take 1 tablet by mouth 20 mg as needed for tremors  STATIN NOT PRESCRIBED, INTENTIONAL,, 1 each continuous prn. Statin not prescribed intentionally due to Refusal by patient  tretinoin (RETIN-A) 0.025 % external cream, Apply a pea size to entire face QD  VITAMIN E-200 PO, Take  by mouth. Takes 1 daily  hydroquinone (VINCE) 4 % external cream, Apply to face BID x 4-6 months then d/c (Patient not taking: Reported on 4/25/2022)    No current facility-administered medications on file prior to visit.      ALLERGIES     Allergies   Allergen Reactions     Penicillins Shortness Of Breath and Hives     Ace Inhibitors      Head tremor     Erythromycin      Other reaction(s): Stomach Upset       FAMILY HISTORY  Family History   Problem Relation Age of Onset     Cancer Mother      Diabetes Mother      Hypertension Mother      Cerebrovascular Disease Mother      Glaucoma Mother 70        one eye-sudden  loss of vision upon waking     Hypertension Brother      Hypertension Brother      Glaucoma Brother      Thyroid Disease No family hx of      Macular Degeneration No family hx of        SOCIAL HISTORY  Social History     Socioeconomic History     Marital status:      Spouse name: Not on file     Number of children: Not on file     Years of education: Not on file     Highest education level: Not on file   Occupational History     Not on file   Tobacco Use     Smoking status: Never Smoker     Smokeless tobacco: Never Used     Tobacco comment: Lives in smoke free household   Substance and Sexual Activity     Alcohol use: Yes     Comment: occassional     Drug use: No     Sexual activity: Not Currently   Other Topics Concern     Parent/sibling w/ CABG, MI or angioplasty before 65F 55M? Not Asked   Social History Narrative     Not on file     Social Determinants of Health     Financial Resource Strain: Not  on file   Food Insecurity: Not on file   Transportation Needs: Not on file   Physical Activity: Not on file   Stress: Not on file   Social Connections: Not on file   Intimate Partner Violence: Not on file   Housing Stability: Not on file       ROS:   Complete ROS negative other than what is stated in HPI.     EXAM:  BP (!) 177/80 (BP Location: Left arm, Patient Position: Chair, Cuff Size: Child)   Pulse 71   Ht 5' (1.524 m)   Wt 111 lb (50.3 kg)   SpO2 (!) 70%   BMI 21.68 kg/m    In general, the patient is a pleasant female in no apparent distress.    HEENT: NC/AT.  PERRLA.  EOMI.  Sclerae white, not injected.    Neck: No adenopathy.  Carotids +2/2 bilaterally with left bruits.   Heart: RRR. Normal S1, S2 splits physiologically. No murmur, rub, click, or gallop.   Lungs: CTA.  No ronchi, wheezes, rales.    Abdomen: Soft, nontender, nondistended.   Extremities: No clubbing, cyanosis, or edema.  No wounds.     Labs:  LIPID RESULTS:  Lab Results   Component Value Date    CHOL 243 (H) 04/14/2022    CHOL 249 (H) 10/07/2015    HDL 76 04/14/2022    HDL 63 10/07/2015     (H) 04/14/2022     (H) 10/07/2015    TRIG 140 04/14/2022    TRIG 143 10/07/2015    CHOLHDLRATIO 4.0 10/07/2015       LIVER ENZYME RESULTS:  Lab Results   Component Value Date    AST 21 10/07/2015    ALT 29 10/07/2015       CBC RESULTS:  Lab Results   Component Value Date    WBC 10.0 12/15/2020    RBC 4.29 12/15/2020    HGB 13.3 12/15/2020    HCT 39.6 12/15/2020    MCV 92 12/15/2020    MCH 31.0 12/15/2020    MCHC 33.6 12/15/2020    RDW 13.6 12/15/2020     12/15/2020       BMP RESULTS:  Lab Results   Component Value Date     04/14/2022     11/08/2019    POTASSIUM 4.2 04/14/2022    POTASSIUM 4.4 12/15/2020    CHLORIDE 101 04/14/2022    CHLORIDE 105 11/08/2019    CO2 26 04/14/2022    CO2 30 11/08/2019    ANIONGAP 7 04/14/2022    ANIONGAP 3 11/08/2019    GLC 96 04/14/2022    GLC 96 11/08/2019    BUN 10 04/14/2022    BUN 12  11/08/2019    CR 0.89 04/14/2022    CR 0.80 12/15/2020    GFRESTIMATED 67 04/14/2022    GFRESTIMATED 72 12/15/2020    GFRESTBLACK 84 12/15/2020    KOMAL 9.2 04/14/2022    KOMAL 9.2 11/08/2019        A1C RESULTS:  No results found for: A1C    THYROID RESULTS:  Lab Results   Component Value Date    TSH 1.35 11/21/2013         DIAGNOSTIC STUDIES:     Images:  US Carotid Bilateral    Result Date: 4/4/2022  BILATERAL CAROTID ULTRASOUND April 4, 2022 11:13 AM HISTORY: Bruit of left carotid artery. COMPARISON: None. RIGHT CAROTID FINDINGS: Plaque in the common carotid, carotid bulb and internal carotid artery. Right ICA PSV:  137  cm/sec. Right ICA EDV:  34 cm/sec. Right ICA/CCA PSV Ratio:  1.2.  These indicate 50-69% diameter stenosis of the right ICA.  Right Vertebral: Antegrade flow. Right ECA: Antegrade flow. LEFT CAROTID FINDINGS: Plaque in the common carotid, carotid bulb and internal carotid artery. Left ICA PSV:  124  cm/sec. Left ICA EDV:  33 cm/sec. Left ICA/CCA PSV Ratio:  1.5.  These indicate  50 - 69%  diameter stenosis of the left ICA.  Left Vertebral: Antegrade flow. Left ECA: Antegrade flow. Severe stenosis with peak systolic velocity of 295 cm/s. Causes of Decreased Accuracy: None.     IMPRESSION:  1. 50-69% diameter stenosis of the right internal carotid artery relative to the distal internal carotid artery diameter. 2. 50-69% diameter stenosis of the left internal carotid artery relative to the distal internal carotid artery diameter. 3. Severe stenosis of the left external carotid artery. MARY LYNCH DO   SYSTEM ID:  [id number]      .      González Brunner MD        20 minutes spent on the date of the encounter doing chart review, history and exam, documentation, and further activities as noted above.

## 2022-04-25 NOTE — TELEPHONE ENCOUNTER
Patient needs to be scheduled for CTA head neck and in person or virtual follow up with Dr. Brunner.       Olga Lidia FUN, RN    [facility]  Office: [phone]  Fax: [phone]

## 2022-04-25 NOTE — PROGRESS NOTES
Patient is here to discuss Ref by Dr. Bijan Atkinson for bilateral carotid artery stenosis.    BP (!) 177/80 (BP Location: Left arm, Patient Position: Chair, Cuff Size: Child)   Pulse 71   Ht 5' (1.524 m)   Wt 111 lb (50.3 kg)   SpO2 (!) 70%   BMI 21.68 kg/m      Questions patient would like addressed today are: N/A.    Refills are needed: N/A    Has homecare services and agency name:  Patricia Thomas

## 2022-04-26 ENCOUNTER — TELEPHONE (OUTPATIENT)
Dept: FAMILY MEDICINE | Facility: CLINIC | Age: 77
End: 2022-04-26
Payer: COMMERCIAL

## 2022-04-26 DIAGNOSIS — I65.22 LEFT CAROTID ARTERY STENOSIS: ICD-10-CM

## 2022-04-26 DIAGNOSIS — I10 BENIGN ESSENTIAL HYPERTENSION: ICD-10-CM

## 2022-04-26 DIAGNOSIS — R09.89 BRUIT OF LEFT CAROTID ARTERY: ICD-10-CM

## 2022-04-26 RX ORDER — ROSUVASTATIN CALCIUM 10 MG/1
10 TABLET, COATED ORAL DAILY
Qty: 90 TABLET | Refills: 1 | Status: SHIPPED | OUTPATIENT
Start: 2022-04-26 | End: 2022-07-07

## 2022-04-26 RX ORDER — AMLODIPINE BESYLATE 10 MG/1
10 TABLET ORAL DAILY
Qty: 90 TABLET | Refills: 1 | Status: SHIPPED | OUTPATIENT
Start: 2022-04-26 | End: 2022-06-28

## 2022-04-26 NOTE — TELEPHONE ENCOUNTER
Dr. Atkinson is out of clinic for several weeks.    RN- please review below with pt    Review of visit with vascular medicine who recommended increase in doses.  Agree with recommendations to minimize risk of stroke.    Amlodipine will help with elevated blood pressure which she has had for some time though not as severe as at today's visit.      Increasing crestor will help decrease further plaque formation as well has help keep current plaques from breaking and causing stroke/heart attack.    Recommend pt trial higher doses and follow-up with Dr. Atkinson when he returns in May.

## 2022-04-26 NOTE — TELEPHONE ENCOUNTER
Reason for call:  Other   Patient called regarding (reason for call): prescription  Additional comments: Patient would like to stay at 5 MG for both the Amlodipine and Crestor instead of increasing the dosage.  Please call to discuss.     Phone number to reach patient:  Cell number on file:    Telephone Information:   Mobile [phone]       Best Time:  After 12 pm    Can we leave a detailed message on this number?  YES    Travel screening: Not Applicable

## 2022-04-26 NOTE — TELEPHONE ENCOUNTER
Patient notified of provider's message as written below. Patient reports that she has trouble with medication and she doesn't want to do the increased amlodipine and start the Crestor at the same time. She will start the Crestor 10 mg daily, does not want to increase the amlodipine at this time. She is but needs a refill of of the amlodipine 5 mg tablet 1 tablet daily.    Nursing advice:  Patient was informed that I will send this to the provider pool to see if they will refill the amlodipine 5 mg, but I am not sure that it will be done today.  She states that she is disappointment in this as she called this AM.  I reiterated that it is 5:00 pm and I am not sure this will get addressed. I informed her that vascular sent a Prescription(s) for the 10 mg tablets and she has the ability to take those. Stated that she never got a notification from her pharmacy this was sent.  She stated that she doesn't have any choice and will  the mediation and take both the amlodipine 10 mg 1 tablet daily and take the Crestor 10 mg 1 tablet daily.  Patient verbalized good understanding, agrees with plan and needs no further support.  Thank you. Rosangela Renae R.N.

## 2022-05-05 ENCOUNTER — HOSPITAL ENCOUNTER (OUTPATIENT)
Dept: CT IMAGING | Facility: CLINIC | Age: 77
Discharge: HOME | End: 2022-05-05
Attending: INTERNAL MEDICINE | Admitting: INTERNAL MEDICINE
Payer: COMMERCIAL

## 2022-05-05 DIAGNOSIS — R09.89 BRUIT OF LEFT CAROTID ARTERY: ICD-10-CM

## 2022-05-05 DIAGNOSIS — I65.22 LEFT CAROTID ARTERY STENOSIS: ICD-10-CM

## 2022-05-05 LAB — RADIOLOGIST FLAGS: ABNORMAL

## 2022-05-05 PROCEDURE — 70496 CT ANGIOGRAPHY HEAD: CPT

## 2022-05-05 PROCEDURE — 250N000009 HC RX 250: Performed by: INTERNAL MEDICINE

## 2022-05-05 PROCEDURE — 250N000011 HC RX IP 250 OP 636: Performed by: INTERNAL MEDICINE

## 2022-05-05 RX ORDER — IOPAMIDOL 755 MG/ML
75 INJECTION, SOLUTION INTRAVASCULAR ONCE
Status: COMPLETED | OUTPATIENT
Start: 2022-05-05 | End: 2022-05-05

## 2022-05-05 RX ADMIN — SODIUM CHLORIDE 100 ML: 9 INJECTION, SOLUTION INTRAVENOUS at 10:59

## 2022-05-05 RX ADMIN — IOPAMIDOL 75 ML: 755 INJECTION, SOLUTION INTRAVENOUS at 10:59

## 2022-05-13 ENCOUNTER — VIRTUAL VISIT (OUTPATIENT)
Dept: OTHER | Facility: CLINIC | Age: 77
End: 2022-05-13
Attending: INTERNAL MEDICINE
Payer: COMMERCIAL

## 2022-05-13 DIAGNOSIS — I65.22 LEFT CAROTID ARTERY STENOSIS: ICD-10-CM

## 2022-05-13 DIAGNOSIS — R09.89 BRUIT OF LEFT CAROTID ARTERY: ICD-10-CM

## 2022-05-13 DIAGNOSIS — I67.1 ANTERIOR COMMUNICATING ARTERY ANEURYSM: Primary | ICD-10-CM

## 2022-05-13 PROCEDURE — 99213 OFFICE O/P EST LOW 20 MIN: CPT | Mod: 95 | Performed by: INTERNAL MEDICINE

## 2022-05-13 NOTE — PROGRESS NOTES
Mireya is a 77 year old who is being evaluated via a billable telephone visit.      What phone number would you like to be contacted at? [phone]  How would you like to obtain your AVS? Raissa Thomas

## 2022-05-13 NOTE — PROGRESS NOTES
[facility] VASCULAR CLINIC  González Brunner MD - Vascular Medicine Progress Note    LOCATION: [facility]    Mami Dumont  Medical Record #:  [id number]  YOB: 1945  Age:  77 year old     Date of Service: 5/13/2022     PRIMARY CARE PROVIDER: Yoel Roth    REASON FOR VISIT:   follow up  for CTA of the head and neck results    IMPRESSION: Patient is completely asymptomatic, ordered a CTA of the head and neck because there was a heavy plaque on on internal carotid artery which in my opinion did interfere with good assessment of the velocity which in turn can affect the degree of stenosis, CTA did confirm the same exact degree of stenosis less than 50% stenosis of the left carotid artery both the common carotid and internal carotid artery on the left side yet it did show 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 for cat/DM/M/RW per Dr. Shah.           Coronary artery disease due to calcified coronary lesion 7/9/2017     Diagnostic skin and sensitization tests     1/99 skin tests: pos. for cat/DM/M/RW per Dr. Shah.      House dust mite allergy      Hypertension goal BP (blood pressure) < 140/90 9/10/2013     Open angle with borderline findings, low risk 8/29/2013     Rhinitis, allergic to other allergen      Seasonal allergic conjunctivitis      Seasonal allergic rhinitis     hx of  elevated BP on Sudafed, Pt told to discontinue Sudafed products by Dr. Taylor on 3/1/10 visit.      Tubular adenoma of colon 5/2013    colonoscopy due 5/2018       PAST SURGICAL HISTORY:  Past Surgical History:   Procedure Laterality Date     BLEPHAROPLASTY, BROW LIFT BILATERAL, COMBINED  05/02/2013    with snip punctoplasty,[place]     BLEPHAROPLASTY, BROW LIFT BILATERAL, COMBINED Bilateral 05/19/2017    Procedure: COMBINED BLEPHAROPLASTY, BROW LIFT BILATERAL;  BILATERAL UPPER LID BLEPHAROPLASTY WITH BROW PTOSIS ;  Surgeon: Faisal Alicea MD;  Location:  [facility]     ENDOSCOPIC POLYPECTOMY NASAL  1960    nasal polyp removal     PHACOEMULSIFICATION WITH STANDARD INTRAOCULAR LENS IMPLANT Left 12/28/2020         CURRENT MEDICATIONS  amLODIPine (NORVASC) 10 MG tablet, Take 1 tablet (10 mg) by mouth daily  azelastine (ASTELIN) 0.1 % nasal spray, Spray 1 spray into both nostrils 2 times daily (Patient taking differently: Spray 1 spray into both nostrils 2 times daily As needed)  FLONASE 50 MCG/ACT nasal spray, Spray 2 sprays in nostril daily Brand necessary. Patient gets headache with generic.  Hypertonic Nasal Wash (SINUS RINSE BOTTLE KIT NA), Spray 1 Bottle in nostril as needed.  loratadine-pseudoePHEDrine (CLARITIN-D 12-HOUR) 5-120 MG 12 hr tablet, Take 1 tablet by mouth as needed Taking less than prior due to hypertension.  methocarbamol (ROBAXIN) 500 MG tablet, Take 1 tablet (500 mg) by mouth 4 times daily as needed for muscle spasms  Multiple Vitamins-Minerals (CENTRUM PO), Take  by mouth. Takes 1 daily  naphazoline-pheniramine (NAPHCON A) SOLN ophthalmic solution, Place 1-2 drops into both eyes as needed  naproxen (NAPROSYN) 500 MG tablet, Take 500 mg by mouth 2 times daily (with meals).  propranolol HCl 60 MG TABS, Take 1 tablet by mouth 20 mg as needed for tremors  rosuvastatin (CRESTOR) 10 MG tablet, Take 1 tablet (10 mg) by mouth daily  STATIN NOT PRESCRIBED, INTENTIONAL,, 1 each continuous prn. Statin not  prescribed intentionally due to Refusal by patient  tretinoin (RETIN-A) 0.025 % external cream, Apply a pea size to entire face QD  VITAMIN E-200 PO, Take  by mouth. Takes 1 daily  hydroquinone (VINCE) 4 % external cream, Apply to face BID x 4-6 months then d/c (Patient not taking: No sig reported)    No current facility-administered medications on file prior to visit.      ALLERGIES     Allergies   Allergen Reactions     Penicillins Shortness Of Breath and Hives     Ace Inhibitors      Head tremor     Erythromycin      Other reaction(s): Stomach Upset       FAMILY HISTORY  Family History   Problem Relation Age of Onset     Cancer Mother      Diabetes Mother      Hypertension Mother      Cerebrovascular Disease Mother      Glaucoma Mother 70        one eye-sudden  loss of vision upon waking     Hypertension Brother      Hypertension Brother      Glaucoma Brother      Thyroid Disease No family hx of      Macular Degeneration No family hx of        SOCIAL HISTORY  Social History     Socioeconomic History     Marital status:      Spouse name: Not on file     Number of children: Not on file     Years of education: Not on file     Highest education level: Not on file   Occupational History     Not on file   Tobacco Use     Smoking status: Never Smoker     Smokeless tobacco: Never Used     Tobacco comment: Lives in smoke free household   Substance and Sexual Activity     Alcohol use: Not Currently     Comment: occassional     Drug use: No     Sexual activity: Not Currently   Other Topics Concern     Parent/sibling w/ CABG, MI or angioplasty before 65F 55M? Not Asked   Social History Narrative     Not on file     Social Determinants of Health     Financial Resource Strain: Not on file   Food Insecurity: Not on file   Transportation Needs: Not on file   Physical Activity: Not on file   Stress: Not on file   Social Connections: Not on file   Intimate Partner Violence: Not on file   Housing Stability: Not on file        ROS:   Complete ROS negative other than what is stated in HPI.     Labs:  LIPID RESULTS:  Lab Results   Component Value Date    CHOL 243 (H) 04/14/2022    CHOL 249 (H) 10/07/2015    HDL 76 04/14/2022    HDL 63 10/07/2015     (H) 04/14/2022     (H) 10/07/2015    TRIG 140 04/14/2022    TRIG 143 10/07/2015    CHOLHDLRATIO 4.0 10/07/2015       LIVER ENZYME RESULTS:  Lab Results   Component Value Date    AST 21 10/07/2015    ALT 29 10/07/2015       CBC RESULTS:  Lab Results   Component Value Date    WBC 10.0 12/15/2020    RBC 4.29 12/15/2020    HGB 13.3 12/15/2020    HCT 39.6 12/15/2020    MCV 92 12/15/2020    MCH 31.0 12/15/2020    MCHC 33.6 12/15/2020    RDW 13.6 12/15/2020     12/15/2020       BMP RESULTS:  Lab Results   Component Value Date     04/14/2022     11/08/2019    POTASSIUM 4.2 04/14/2022    POTASSIUM 4.4 12/15/2020    CHLORIDE 101 04/14/2022    CHLORIDE 105 11/08/2019    CO2 26 04/14/2022    CO2 30 11/08/2019    ANIONGAP 7 04/14/2022    ANIONGAP 3 11/08/2019    GLC 96 04/14/2022    GLC 96 11/08/2019    BUN 10 04/14/2022    BUN 12 11/08/2019    CR 0.89 04/14/2022    CR 0.80 12/15/2020    GFRESTIMATED 67 04/14/2022    GFRESTIMATED 72 12/15/2020    GFRESTBLACK 84 12/15/2020    KOMAL 9.2 04/14/2022    KOMAL 9.2 11/08/2019        A1C RESULTS:  No results found for: A1C    THYROID RESULTS:  Lab Results   Component Value Date    TSH 1.35 11/21/2013         DIAGNOSTIC STUDIES:     Images:  CTA Head Neck with Contrast    Result Date: 5/5/2022  CTA  HEAD NECK WITH CONTRAST 5/5/2022 11:17 AM INDICATION: Bruit of left carotid artery. Left carotid artery stenosis. TECHNIQUE: Head and neck CT angiogram with IV contrast. CT images of the head and neck vessels obtained during the arterial phase of intravenous contrast administration. Axial helical 2D reconstructed images and multiplanar 3D MIP reconstructed images of the head and neck vessels were performed on a separate workstation. Dose  reduction techniques were used. CONTRAST: 75mL Isovue-370 COMPARISON: 4/4/2022 carotid artery ultrasound. FINDINGS: HEAD CTA: Bilateral distal internal carotid arteries are patent. Mild narrowing in the left M1 segment. Bilateral MCA are otherwise patent. Bilateral ECA are patent. Distal vertebral arteries and basilar artery are patent. Fetal origin right PCA with moderate narrowing in the right posterior communicating artery. Left PCA is patent. Anterior and inferiorly projecting saccular aneurysm arising from the anterior communicating artery measures 4 mm AP, 4 mm craniocaudal, and 3 mm transverse. No additional aneurysms elsewhere. NECK CTA: Three-vessel aortic arch. Mild narrowing in the right common carotid artery and proximal right internal carotid artery. Irregular calcified atherosclerosis at the left carotid bifurcation and proximal left internal carotid artery contributes to a 50% stenosis at the left carotid bifurcation and proximal left internal carotid artery. Dominant left vertebral artery with no significant vertebral artery stenosis. Visualized lung apices are clear. Osteopenia with cervical spine degenerative change. Remainder negative.     IMPRESSION: HEAD CTA: 1.  Fetal origin right PCA with moderate narrowing in the right posterior communicating artery. 2.  No additional intracranial stenoses elsewhere. 3.  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 NECK CTA: 1.  Approximately 50% stenosis of the left carotid bifurcation and proximal left internal carotid artery with only mild narrowing at the right carotid bifurcation and proximal right internal carotid artery. 2.  No significant vertebral artery stenosis. [Access Center: Please see head CTA impression 3] This report will be copied to the [facility] to ensure a provider acknowledges the finding. [facility] Center is available Monday through Friday 8am-3:30 pm.  TIGRE WOOD  MD GISELA   SYSTEM ID:  [id number]    .      González Brunner MD        15 minutes spent on the date of the encounter doing chart review, history and exam, documentation, and further activities as noted above.

## 2022-05-16 ENCOUNTER — PATIENT OUTREACH (OUTPATIENT)
Dept: FAMILY MEDICINE | Facility: CLINIC | Age: 77
End: 2022-05-16
Payer: COMMERCIAL

## 2022-05-16 NOTE — TELEPHONE ENCOUNTER
Patient Quality Outreach    Patient is due for the following:   Physical  - Due after NOW , BP check     NEXT STEPS:   Schedule a yearly physical    Type of outreach:    Sent [employer] message.      Questions for provider review:    None     Donna Caldwell, CMA

## 2022-05-23 ENCOUNTER — TELEPHONE (OUTPATIENT)
Dept: NEUROSURGERY | Facility: CLINIC | Age: 77
End: 2022-05-23
Payer: COMMERCIAL

## 2022-05-23 NOTE — TELEPHONE ENCOUNTER
[facility] Call Center    Phone Message    May a detailed message be left on voicemail: yes     Reason for Call: Appointment Intake    Referring Provider Name: Dr. Brunner  Diagnosis and/or Symptoms: Anterior communicating artery aneurysm    Please call Pt back with scheduling options.     Action Taken: Message routed to:  [facility] ([facility]): [facility] neurosurgery    Travel Screening: Not Applicable

## 2022-05-23 NOTE — TELEPHONE ENCOUNTER
I called Mireya and she stated she received a call from the neurosurgery clinic and they provided her with the number to [facility] neuro and I explained it most likely because she needs to be seen by Fernando JAMES for this type of aneurysm and she should call the number she was given. She verbalized understanding.    Olga Lidia HAMMER, RN    [facility]  Vascular [facility]  Office: [phone]  Fax: [phone]

## 2022-05-23 NOTE — TELEPHONE ENCOUNTER
Who is the name of the provider?:   Myesha        What is the location you see this provider at?: [place]        Reason for call:  Patient is asking about neurosurgery referral . Pt has not received a call from any clinic and would like to know the status .       Contact name: Mireya       Contact number: [phone]

## 2022-06-07 NOTE — TELEPHONE ENCOUNTER
Action 6/7/22 MV 12.26pm   Action Taken Imaging request faxed to [facility]    6/17/22 MV 1.51pm  Images resolved in PACS         RECORDS RECEIVED FROM: internal   REASON FOR VISIT: Anterior communicating artery aneurysm   Date of Appt: 7/12/22   NOTES (FOR ALL VISITS) STATUS DETAILS   OFFICE NOTE from referring provider Internal Dr González Brunner @ [facility] Vascular Clinic:  5/13/22 4/25/22   MEDICATION LIST Internal    IMAGING  (FOR ALL VISITS)     ULTRASOUND (CAROTID BILAT) *VASCULAR* Internal [facility]:  US Carotid Bilat 4/4/22   CT (HEAD, NECK, SPINE) Internal/Received [facility]:  CTA Head Neck 5/5/22    [facility]:  CT Head 6/17/17

## 2022-06-24 DIAGNOSIS — I10 BENIGN ESSENTIAL HYPERTENSION: ICD-10-CM

## 2022-06-24 NOTE — TELEPHONE ENCOUNTER
Received called from pt Mami, advised that she needed a refill on the Amlodipine, would like to be changed back to the 5mg prescription, having symptoms with the 10mg. Pt refused to be triaged by an RN or disclose the issues she is having with the 10mg Amlodipine. Pt would like a call back from Dr. Atkinson if Rx can be changed and when it will be refilled.

## 2022-06-28 RX ORDER — AMLODIPINE BESYLATE 5 MG/1
5 TABLET ORAL DAILY
Qty: 30 TABLET | Refills: 1 | Status: SHIPPED | OUTPATIENT
Start: 2022-06-28 | End: 2022-08-31

## 2022-06-28 NOTE — TELEPHONE ENCOUNTER
Pt calling to follow up on this request. She feels crummy on the 10 mg dose and would like a new prescription for 5 mg tablets.    Can you make this change?  Huma Meza BSN, RN

## 2022-06-28 NOTE — TELEPHONE ENCOUNTER
Called pt and informed, pt is leaving for vacation, will call and schedule following.    Cody Breaux

## 2022-07-07 ENCOUNTER — OFFICE VISIT (OUTPATIENT)
Dept: FAMILY MEDICINE | Facility: CLINIC | Age: 77
End: 2022-07-07
Payer: COMMERCIAL

## 2022-07-07 VITALS
WEIGHT: 111 LBS | HEART RATE: 85 BPM | BODY MASS INDEX: 21.79 KG/M2 | TEMPERATURE: 98.9 F | OXYGEN SATURATION: 98 % | DIASTOLIC BLOOD PRESSURE: 68 MMHG | HEIGHT: 60 IN | SYSTOLIC BLOOD PRESSURE: 140 MMHG

## 2022-07-07 DIAGNOSIS — R47.01 APHASIA: ICD-10-CM

## 2022-07-07 DIAGNOSIS — I63.9 CEREBROVASCULAR ACCIDENT (CVA), UNSPECIFIED MECHANISM (H): ICD-10-CM

## 2022-07-07 DIAGNOSIS — I65.23 BILATERAL CAROTID ARTERY STENOSIS: ICD-10-CM

## 2022-07-07 DIAGNOSIS — Z09 HOSPITAL DISCHARGE FOLLOW-UP: Primary | ICD-10-CM

## 2022-07-07 PROCEDURE — 99213 OFFICE O/P EST LOW 20 MIN: CPT | Performed by: FAMILY MEDICINE

## 2022-07-07 RX ORDER — PROPRANOLOL HYDROCHLORIDE 20 MG/1
20 TABLET ORAL
COMMUNITY

## 2022-07-07 RX ORDER — PANTOPRAZOLE SODIUM 40 MG/1
TABLET, DELAYED RELEASE ORAL
COMMUNITY
Start: 2022-05-26

## 2022-07-07 RX ORDER — METHOCARBAMOL 500 MG/1
500 TABLET, FILM COATED ORAL
COMMUNITY
Start: 2022-07-01 | End: 2022-12-28

## 2022-07-07 RX ORDER — CLOPIDOGREL BISULFATE 75 MG/1
75 TABLET ORAL
COMMUNITY
Start: 2022-07-02 | End: 2023-08-14

## 2022-07-07 RX ORDER — ASPIRIN 81 MG/1
81 TABLET, CHEWABLE ORAL
COMMUNITY
Start: 2022-07-02

## 2022-07-07 RX ORDER — ATORVASTATIN CALCIUM 20 MG/1
20 TABLET, FILM COATED ORAL
COMMUNITY
Start: 2022-07-01 | End: 2023-11-21

## 2022-07-07 ASSESSMENT — PAIN SCALES - GENERAL: PAINLEVEL: NO PAIN (0)

## 2022-07-07 NOTE — PROGRESS NOTES
Assessment & Plan     Hospital discharge follow-up  Mami Dumont is a 77 year old female who presents today for follow up after recent hospitalization for  expressive aphasia secondary to a CVA and was administered tPA in the emergency department. She had complete resolution of her aphasia on hospital day 1.MRI was performed that showed no evidence of residual ischemia.   Today she is doing well. She back to base line   She is currently on Plavix, aspirin and statin   She is going to follow up wioth neurosurgery regarding bilateral carotid stenosis and carotid artery aneurysm.     Bilateral carotid artery stenosis  Follow up with neurosurgery on 07/12/2022 for further evaluation and management   Aphasia  Cerebrovascular accident (CVA), unspecified mechanism (H)  As above   continue statin, aspirin and plavix , follow up with neurosurgery                Return in about 3 months (around 10/7/2022).    Bijan Atkinson MD  [facility]    Marbin Gomes is a 77 year old, presenting for the following health issues:  Hospital F/U      HPI       Hospital Follow-up Visit:    [facility]  Date of Admission: 6/29/22  Date of Discharge: 6/30/22  Reason(s) for Admission: CVA    Was your hospitalization related to COVID-19? No   Problems taking medications regularly:  None  Medication changes since discharge: Med List updated  Problems adhering to non-medication therapy:  None    Summary of hospitalization:  CareEverywhere information obtained and reviewed  Diagnostic Tests/Treatments reviewed.  Follow up needed: Neurosurgery   Other Healthcare Providers Involved in Patient s Care:         Specialist appointment - 07/12/2022  Update since discharge: improved.       Post Discharge Medication Reconciliation: discharge medications reconciled, continue medications without change.  Plan of care communicated with patient             MRI 06/30/2022    IMPRESSION:   1.   No acute intracranial process.   2.  Generalized brain atrophy and presumed microvascular ischemic changes as detailed above.    Echo 06/30/2022    Final Conclusion    Visually Estimated EF: 60-65%    Technically difficult study - contrast was used to enhance endocardial definition due to   suboptimal image quality.    Normal LV size and systolic function.    No regional wall motion abnormalities noted.    No significant valvular heart disease noted.    Normal RV size and systolic function.    Estimated RV systolic pressure of 25.5 mmHg + RA pressure.    No prior studies available for comparison.          Review of Systems   Constitutional, HEENT, cardiovascular, pulmonary, gi and gu systems are negative, except as otherwise noted.      Objective    BP (!) 140/68 (BP Location: Right arm)   Pulse 85   Temp 98.9  F (37.2  C) (Tympanic)   Ht 1.524 m (5')   Wt 50.3 kg (111 lb)   SpO2 98%   BMI 21.68 kg/m    Body mass index is 21.68 kg/m .  Physical Exam  Vitals and nursing note reviewed.   Constitutional:       General: She is not in acute distress.     Appearance: Normal appearance. She is not ill-appearing or toxic-appearing.   HENT:      Head: Atraumatic.   Neck:      Vascular: Carotid bruit present.   Cardiovascular:      Rate and Rhythm: Normal rate and regular rhythm.      Pulses: Normal pulses.      Heart sounds: Normal heart sounds. No murmur heard.    No friction rub. No gallop.   Pulmonary:      Effort: Pulmonary effort is normal. No respiratory distress.      Breath sounds: Normal breath sounds. No stridor. No wheezing, rhonchi or rales.   Chest:      Chest wall: No tenderness.   Musculoskeletal:         General: Normal range of motion.      Cervical back: No rigidity or tenderness.   Lymphadenopathy:      Cervical: No cervical adenopathy.   Skin:     General: Skin is warm.      Capillary Refill: Capillary refill takes less than 2 seconds.   Neurological:      General: No focal deficit present.       Mental Status: She is oriented to person, place, and time. Mental status is at baseline.      Cranial Nerves: No cranial nerve deficit.      Sensory: No sensory deficit.      Motor: No weakness.      Gait: Gait normal.                            .  ..

## 2022-07-12 ENCOUNTER — PRE VISIT (OUTPATIENT)
Dept: NEUROSURGERY | Facility: CLINIC | Age: 77
End: 2022-07-12
Payer: COMMERCIAL

## 2022-07-12 ENCOUNTER — OFFICE VISIT (OUTPATIENT)
Dept: NEUROSURGERY | Facility: CLINIC | Age: 77
End: 2022-07-12
Attending: INTERNAL MEDICINE
Payer: COMMERCIAL

## 2022-07-12 VITALS
WEIGHT: 110.9 LBS | HEART RATE: 76 BPM | BODY MASS INDEX: 21.66 KG/M2 | DIASTOLIC BLOOD PRESSURE: 64 MMHG | OXYGEN SATURATION: 99 % | SYSTOLIC BLOOD PRESSURE: 156 MMHG

## 2022-07-12 DIAGNOSIS — I67.1 ANTERIOR COMMUNICATING ARTERY ANEURYSM: ICD-10-CM

## 2022-07-12 PROCEDURE — 99203 OFFICE O/P NEW LOW 30 MIN: CPT | Performed by: RADIOLOGY

## 2022-07-12 ASSESSMENT — PAIN SCALES - GENERAL: PAINLEVEL: NO PAIN (0)

## 2022-07-12 NOTE — PATIENT INSTRUCTIONS
CTA head follow up 6 months    If you have any questions please contact me at [phone], option 3.    Raissa Infante RN, CNRN, SCRN  Stroke & Endovascular Care Coordinator    Thank you for choosing [facility] for your health care needs.

## 2022-07-12 NOTE — LETTER
7/12/2022       RE: Mami Dumont  [address]  [address]  [place] [facility] [place] [address]     Dear Colleague,    Thank you for referring your patient, Mami Dumont, to the [facility] NEUROSURGERY [facility]INIC [place] at [facility]. Please see a copy of my visit note below.    Ms Dumont is a very pleasant 77 year old who presents to clinic for evaluation of an incidentally discovered small ( 4 mm ) ACOM aneurysm. The CTA had been obtained to further evaluate a bruit head over her left carotid artery. That artery is not significantly narrowed.   I discussed with her regarding the importance of finding a 4 mm ACOM aneurysm in someone her age. I do not think that this aneurysm posed a substantial risk of rupture based on ISUIA data. Further, treatment is likely to be not without risk. My recommendation is that we follow up this aneurysm with non invasive imaging. If it grows, we will consider surgical management , otherwise we will continue with conservative management, the patient is in agreement. We will repeat CTA in a year.      Sincerely,    Jone Rao MD

## 2022-07-19 ENCOUNTER — TELEPHONE (OUTPATIENT)
Dept: DERMATOLOGY | Facility: CLINIC | Age: 77
End: 2022-07-19

## 2022-07-19 DIAGNOSIS — L98.8 FACIAL RHYTIDS: ICD-10-CM

## 2022-07-19 DIAGNOSIS — L81.4: ICD-10-CM

## 2022-07-19 NOTE — TELEPHONE ENCOUNTER
Prior Authorization Retail Medication Request    Medication/Dose: tretinoin (RETIN-A) 0.025 % external cream  ICD code (if different than what is on RX):    Previously Tried and Failed:    Rationale:      Insurance Name:  [facility]   Insurance ID:  [id number]       Pharmacy Information (if different than what is on RX)  Name:  [facility]   Phone:  [phone]

## 2022-07-20 NOTE — TELEPHONE ENCOUNTER
Central Prior Authorization Team   Phone: [phone]    PA Initiation    Medication: tretinoin (RETIN-A) 0.025 % external cream  Insurance Company: [employer] - Phone [phone] Fax [phone]  Pharmacy Filling the Rx: [id number] - [place] [facility] [address] [facility]  Filling Pharmacy Phone: [phone]  Filling Pharmacy Fax:    Start Date: 7/20/2022

## 2022-07-21 NOTE — TELEPHONE ENCOUNTER
Message left to return call if was not aware that this medication is not covered by insurance. Can send to a different pharmacy for cost savings with a [email or web address] coupon if patient prefers.     Mary Pimentel RN

## 2022-07-21 NOTE — TELEPHONE ENCOUNTER
PRIOR AUTHORIZATION DENIED    Medication: tretinoin (RETIN-A) 0.025 % external cream    Denial Date: 7/21/2022    Denial Rational: This medication is not covered for cosmetic purposes. Must have a diagnosis of acne vulgaris.         Appeal Information:  N/A

## 2022-07-22 NOTE — PROGRESS NOTES
Ms Dumont is a very pleasant 77 year old who presents to clinic for evaluation of an incidentally discovered small ( 4 mm ) ACOM aneurysm. The CTA had been obtained to further evaluate a bruit head over her left carotid artery. That artery is not significantly narrowed.   I discussed with her regarding the importance of finding a 4 mm ACOM aneurysm in someone her age. I do not think that this aneurysm posed a substantial risk of rupture based on ISUIA data. Further, treatment is likely to be not without risk. My recommendation is that we follow up this aneurysm with non invasive imaging. If it grows, we will consider surgical management , otherwise we will continue with conservative management, the patient is in agreement. We will repeat CTA in a year.

## 2022-07-25 NOTE — TELEPHONE ENCOUNTER
Spoke to patient who is aware of message. She is aware of [email or web address] coupons and she will call back if she wants rx sent to a different pharmacy. Mary Pimentel RN

## 2022-07-28 RX ORDER — TRETINOIN 0.25 MG/G
CREAM TOPICAL
Qty: 45 G | Refills: 4 | Status: SHIPPED | OUTPATIENT
Start: 2022-07-28 | End: 2023-04-07

## 2022-07-28 RX ORDER — TRETINOIN 0.25 MG/G
CREAM TOPICAL
Qty: 45 G | Refills: 4 | Status: SHIPPED | OUTPATIENT
Start: 2022-07-28 | End: 2022-07-28

## 2022-07-28 NOTE — TELEPHONE ENCOUNTER
Pt calling back-would like Rx sent to [facility] [address] Ph:  [phone]. Pt also requesting good rx coupon-pt was unable to get coupon online-couldn't get it to work.     Gume Oh  [facility]

## 2022-07-28 NOTE — TELEPHONE ENCOUNTER
THE TRETINOIN CREAM FROM 10/14/21 WAS A LOCAL PRINT. THERE IS NOTHING TO RELEASE. CAN YOU RESEND A NEW RX THEN IF IT GETS HUNG UP WE CAN RELEASE IT.

## 2022-07-28 NOTE — TELEPHONE ENCOUNTER
Can the PA team release the Tretinoin cream rx ot the pharmacy so the Patient can buy it with a [email or web address] coupon?    I can reorder the med, but it will not transmit if the PA team doesn't release the order at least that is my understanding...    Thank you, I will print and send a good rx coupon to patient in the [place] mail. Mary Pimentel RN

## 2022-08-27 DIAGNOSIS — I10 BENIGN ESSENTIAL HYPERTENSION: ICD-10-CM

## 2022-08-30 NOTE — TELEPHONE ENCOUNTER
Routing refill request to provider for review/approval because:  BP out of range       Joellen Hopkins, RN, BSN

## 2022-08-31 ENCOUNTER — TELEPHONE (OUTPATIENT)
Dept: FAMILY MEDICINE | Facility: CLINIC | Age: 77
End: 2022-08-31

## 2022-08-31 DIAGNOSIS — I10 BENIGN ESSENTIAL HYPERTENSION: ICD-10-CM

## 2022-08-31 RX ORDER — AMLODIPINE BESYLATE 5 MG/1
5 TABLET ORAL DAILY
Qty: 30 TABLET | Refills: 1 | Status: SHIPPED | OUTPATIENT
Start: 2022-08-31 | End: 2022-08-31

## 2022-08-31 RX ORDER — AMLODIPINE BESYLATE 5 MG/1
5 TABLET ORAL DAILY
Qty: 90 TABLET | Refills: 1 | Status: SHIPPED | OUTPATIENT
Start: 2022-08-31 | End: 2023-03-08

## 2022-09-19 ENCOUNTER — TRANSFERRED RECORDS (OUTPATIENT)
Dept: HEALTH INFORMATION MANAGEMENT | Facility: CLINIC | Age: 77
End: 2022-09-19

## 2022-10-22 ENCOUNTER — HEALTH MAINTENANCE LETTER (OUTPATIENT)
Age: 77
End: 2022-10-22

## 2023-03-03 DIAGNOSIS — I10 BENIGN ESSENTIAL HYPERTENSION: ICD-10-CM

## 2023-03-06 NOTE — TELEPHONE ENCOUNTER
Medication Question or Refill    Contacts       Type Contact Phone/Fax    03/06/2023 11:38 AM CST Phone (Incoming) Mireya Dumont (Self) [phone] (M)          What medication are you calling about (include dose and sig)?: amLODIPine (NORVASC) 5 MG tablet       Preferred Pharmacy:   [id number] - [facility] [address] [facility]  [address] [place] [id number]  Phone: [phone] Fax: [phone]  Controlled Substance Agreement on file:   CSA -- Patient Level:    CSA: None found at the patient level.       Who prescribed the medication?: zineldin    Do you need a refill? Yes    When did you use the medication last? daily    Patient offered an appointment? No    Do you have any questions or concerns?  No      Could we send this information to you in [facility] or would you prefer to receive a phone call?:   No preference   Okay to leave a detailed message?: Yes at Cell number on file:    Telephone Information:   Mobile [phone]   Elida Breaux,

## 2023-03-08 RX ORDER — AMLODIPINE BESYLATE 5 MG/1
5 TABLET ORAL DAILY
Qty: 90 TABLET | Refills: 1 | Status: SHIPPED | OUTPATIENT
Start: 2023-03-08 | End: 2023-09-06

## 2023-04-07 DIAGNOSIS — L98.8 FACIAL RHYTIDS: ICD-10-CM

## 2023-04-07 DIAGNOSIS — L81.4: ICD-10-CM

## 2023-04-07 RX ORDER — TRETINOIN 0.25 MG/G
CREAM TOPICAL
Qty: 45 G | Refills: 4 | Status: SHIPPED | OUTPATIENT
Start: 2023-04-07 | End: 2024-07-29

## 2023-04-07 NOTE — TELEPHONE ENCOUNTER
Called patient- appointment scheduled for next available 08/17/23.    Thank you,    Mireya DICKERSONRN BSN  [facility]- [phone]

## 2023-04-07 NOTE — TELEPHONE ENCOUNTER
[facility] Call Center    Phone Message    May a detailed message be left on voicemail: yes     Reason for Call: Medication Question or concern regarding medication   Prescription Clarification  Name of Medication: tretinoin (RETIN-A) 0.025 % external cream  Prescribing Provider: Pamela Michelle PA-C   Pharmacy: [facility] in [place] P: [phone]   What on the order needs clarification? Pt states her pharmacy requested a refill of the tretinoin (RETIN-A) 0.025 % external cream on Monday, 4/3/23 and there hasn't been a response. Pt states she is going out of town and needs the medication right away.           Action Taken: Other: [place] [facility]    Travel Screening: Not Applicable

## 2023-04-07 NOTE — TELEPHONE ENCOUNTER
"Requested Prescriptions   Pending Prescriptions Disp Refills     tretinoin (RETIN-A) 0.025 % external cream 45 g 4     Sig: Apply a pea size to entire face QD       Topical Acne Medications Protocol Failed - 4/7/2023 10:14 AM        Failed - Recent (12 mo) or future (30 days) visit within the authorizing provider's specialty     Patient has had an office visit with the authorizing provider or a provider within the authorizing providers department within the previous 12 mos or has a future within next 30 days. See \"Patient Info\" tab in inbasket, or \"Choose Columns\" in Meds & Orders section of the refill encounter.              Passed - Patient is 12 years of age or older        Passed - Medication is active on med list             "

## 2023-06-01 ENCOUNTER — HEALTH MAINTENANCE LETTER (OUTPATIENT)
Age: 78
End: 2023-06-01

## 2023-06-15 ENCOUNTER — OFFICE VISIT (OUTPATIENT)
Dept: OPHTHALMOLOGY | Facility: CLINIC | Age: 78
End: 2023-06-15
Payer: COMMERCIAL

## 2023-06-15 DIAGNOSIS — H02.834 DERMATOCHALASIS OF BOTH UPPER EYELIDS: ICD-10-CM

## 2023-06-15 DIAGNOSIS — H52.4 PRESBYOPIA OF BOTH EYES: ICD-10-CM

## 2023-06-15 DIAGNOSIS — H40.003 GLAUCOMA SUSPECT OF BOTH EYES: ICD-10-CM

## 2023-06-15 DIAGNOSIS — Z96.1 PSEUDOPHAKIA: ICD-10-CM

## 2023-06-15 DIAGNOSIS — H25.811 COMBINED FORMS OF AGE-RELATED CATARACT OF RIGHT EYE: Primary | ICD-10-CM

## 2023-06-15 DIAGNOSIS — H02.831 DERMATOCHALASIS OF BOTH UPPER EYELIDS: ICD-10-CM

## 2023-06-15 DIAGNOSIS — H52.13 MYOPIA OF BOTH EYES: ICD-10-CM

## 2023-06-15 PROCEDURE — 92014 COMPRE OPH EXAM EST PT 1/>: CPT | Performed by: STUDENT IN AN ORGANIZED HEALTH CARE EDUCATION/TRAINING PROGRAM

## 2023-06-15 PROCEDURE — 92015 DETERMINE REFRACTIVE STATE: CPT | Performed by: STUDENT IN AN ORGANIZED HEALTH CARE EDUCATION/TRAINING PROGRAM

## 2023-06-15 ASSESSMENT — CONF VISUAL FIELD
OD_SUPERIOR_TEMPORAL_RESTRICTION: 0
OD_INFERIOR_NASAL_RESTRICTION: 0
OD_INFERIOR_TEMPORAL_RESTRICTION: 0
OS_NORMAL: 1
OS_INFERIOR_TEMPORAL_RESTRICTION: 0
OS_SUPERIOR_TEMPORAL_RESTRICTION: 0
OD_NORMAL: 1
OD_SUPERIOR_NASAL_RESTRICTION: 0
OS_SUPERIOR_NASAL_RESTRICTION: 0
OS_INFERIOR_NASAL_RESTRICTION: 0

## 2023-06-15 ASSESSMENT — REFRACTION_MANIFEST
OD_ADD: +3.00
OD_SPHERE: -3.00
OS_ADD: +3.00
OS_SPHERE: -0.25
OS_CYLINDER: SPHERE
OD_CYLINDER: SPHERE

## 2023-06-15 ASSESSMENT — VISUAL ACUITY
OD_PH_SC+: -1
OD_SC: 20/400
OS_SC: 20/30
METHOD: SNELLEN - LINEAR
OS_SC+: +2
OD_PH_SC: 20/125

## 2023-06-15 ASSESSMENT — SLIT LAMP EXAM - LIDS
COMMENTS: GOOD COSMESIS
COMMENTS: NORMAL

## 2023-06-15 ASSESSMENT — TONOMETRY
OD_IOP_MMHG: 22
OS_IOP_MMHG: 18
IOP_METHOD: APPLANATION

## 2023-06-15 ASSESSMENT — EXTERNAL EXAM - RIGHT EYE: OD_EXAM: MOD BROW

## 2023-06-15 ASSESSMENT — EXTERNAL EXAM - LEFT EYE: OS_EXAM: NORMAL

## 2023-06-15 ASSESSMENT — CUP TO DISC RATIO
OS_RATIO: 0.5
OD_RATIO: 0.4

## 2023-06-15 NOTE — PATIENT INSTRUCTIONS
Offered cataract surgery of the right eye  at [facility]      Surgery Date(s): August 14, 2023 for your right eye      Our surgery schedulers will contact you with appointments    Kyle Ortiz MD  [phone]

## 2023-06-15 NOTE — LETTER
6/15/2023         RE: Mami Dumont  [address]  [address]  [place] [address]        Dear Colleague,    Thank you for referring your patient, Mami Dumont, to the [facility]. Please see a copy of my visit note below.     Current Eye Medications:  No eye drops or eye vitamins.  She uses over-the-counter allergy drops occasionally.     Subjective:  Comprehensive Eye Exam.  She would like to proceed with cataract surgery, if Dr. Ortiz agrees.  Vision in her right eye is very blurry, especially when reading.  Vision in her left eye is adequate.  Night driving is becoming increasingly difficult.  She wears over-the-counter readers as needed, but doesn't always require them.       Objective:  See Ophthalmology Exam.       Assessment:  Mami Dumont is a 78 year old female who presents with:   Encounter Diagnoses   Name Primary?     Combined forms of age-related cataract of right eye Visually significant right eye. Recommend CE/IOL right eye at MG. Dil 6.5. Possible Trypan. Myopic shift right eye. Anticipate targeting mostly distance right eye.      Pseudophakia - Left Eye      Glaucoma suspect of both eyes      Dermatochalasis of both upper eyelids      Myopia of both eyes      Presbyopia of both eyes        Visually significant cataract that is interfering with daily activities of living. Plan for cataract extraction and intraocular lens implant right eye.  Risks, benefits, complications, and alternatives discussed with patient including possibility of limitations from coexistent eye disease and loss of vision. Target refraction and lens options discussed.  Patient understands and wishes to proceed with surgery.     Plan:  Offered cataract surgery of the right eye  at [facility]      Surgery Date(s): August 14, 2023 for your right eye      Our surgery schedulers will contact you with appointments    Kyle Ortiz MD  [phone]           Again, thank  you for allowing me to participate in the care of your patient.        Sincerely,        Kyle Ortiz MD

## 2023-06-15 NOTE — PROGRESS NOTES
Current Eye Medications:  No eye drops or eye vitamins.  She uses over-the-counter allergy drops occasionally.     Subjective:  Comprehensive Eye Exam.  She would like to proceed with cataract surgery, if Dr. Ortiz agrees.  Vision in her right eye is very blurry, especially when reading.  Vision in her left eye is adequate.  Night driving is becoming increasingly difficult.  She wears over-the-counter readers as needed, but doesn't always require them.       Objective:  See Ophthalmology Exam.       Assessment:  Mami Dumont is a 78 year old female who presents with:   Encounter Diagnoses   Name Primary?     Combined forms of age-related cataract of right eye Visually significant right eye. Recommend CE/IOL right eye at MG. Dil 6.5. Possible Trypan. Myopic shift right eye. Anticipate targeting mostly distance right eye. Mild head tremor.     Pseudophakia - Left Eye      Glaucoma suspect of both eyes      Dermatochalasis of both upper eyelids      Myopia of both eyes      Presbyopia of both eyes        Visually significant cataract that is interfering with daily activities of living. Plan for cataract extraction and intraocular lens implant right eye.  Risks, benefits, complications, and alternatives discussed with patient including possibility of limitations from coexistent eye disease and loss of vision. Target refraction and lens options discussed.  Patient understands and wishes to proceed with surgery.     Plan:  Offered cataract surgery of the right eye  at [facility]      Surgery Date(s): August 14, 2023 for your right eye      Our surgery schedulers will contact you with appointments    Kyle Ortiz MD  [phone]

## 2023-06-27 ENCOUNTER — PREP FOR PROCEDURE (OUTPATIENT)
Dept: OPHTHALMOLOGY | Facility: CLINIC | Age: 78
End: 2023-06-27
Payer: COMMERCIAL

## 2023-06-27 ENCOUNTER — TELEPHONE (OUTPATIENT)
Dept: OPHTHALMOLOGY | Facility: CLINIC | Age: 78
End: 2023-06-27

## 2023-06-27 DIAGNOSIS — H25.811 COMBINED FORM OF AGE-RELATED CATARACT, RIGHT EYE: Primary | ICD-10-CM

## 2023-06-27 NOTE — TELEPHONE ENCOUNTER
Type of surgery: RIGHT PHACOEMULSIFICATION, CATARACT, WITH INTRAOCULAR LENS IMPLANT (Right)   CPT 97746     Combined forms of age-related cataract of right eye H25.811     Glaucoma suspect of both eyes H40.003     Dermatochalasis of both upper eyelids H02.831, H02.834     Myopia of both eyes H52.13     Presbyopia of both eyes H52.4    Location of surgery:  [facility]  Date and time of surgery: 8-14-23  Surgeon: Angel  Pre-Op Appt Date:   Post-Op Appt Date:    Packet sent out: Yes  Pre-cert/Authorization completed: no prior auth required per [employer] for [facility] Medicare adv plan.    Date: 6-27-23    Swapna Maciel  Financial Securing/Prior Auth Dept  [phone]

## 2023-07-10 ENCOUNTER — PATIENT OUTREACH (OUTPATIENT)
Dept: FAMILY MEDICINE | Facility: CLINIC | Age: 78
End: 2023-07-10
Payer: COMMERCIAL

## 2023-07-10 NOTE — TELEPHONE ENCOUNTER
Patient Quality Outreach    Patient is due for the following:   Physical Annual Wellness Visit    Next Steps:   Schedule a Annual Wellness Visit    Type of outreach:    Sent [employer] message.      Questions for provider review:               Donna Caldwell CMA

## 2023-08-02 ENCOUNTER — OFFICE VISIT (OUTPATIENT)
Dept: OPHTHALMOLOGY | Facility: CLINIC | Age: 78
End: 2023-08-02
Payer: COMMERCIAL

## 2023-08-02 DIAGNOSIS — H25.811 COMBINED FORM OF AGE-RELATED CATARACT, RIGHT EYE: Primary | ICD-10-CM

## 2023-08-02 DIAGNOSIS — H40.003 GLAUCOMA SUSPECT OF BOTH EYES: ICD-10-CM

## 2023-08-02 DIAGNOSIS — Z96.1 PSEUDOPHAKIA: ICD-10-CM

## 2023-08-02 PROCEDURE — 92012 INTRM OPH EXAM EST PATIENT: CPT | Performed by: STUDENT IN AN ORGANIZED HEALTH CARE EDUCATION/TRAINING PROGRAM

## 2023-08-02 PROCEDURE — 92136 OPHTHALMIC BIOMETRY: CPT | Mod: 26 | Performed by: STUDENT IN AN ORGANIZED HEALTH CARE EDUCATION/TRAINING PROGRAM

## 2023-08-02 RX ORDER — DEXAMETHASONE ACETATE, MICRO 100 %
1 POWDER (GRAM) MISCELLANEOUS 4 TIMES DAILY
Qty: 1 G | Refills: 0 | Status: SHIPPED | OUTPATIENT
Start: 2023-08-02 | End: 2023-09-06

## 2023-08-02 RX ORDER — MONOCHLOROACETIC ACID
1 CRYSTALS MISCELLANEOUS 4 TIMES DAILY
Qty: 1 G | Refills: 0 | Status: SHIPPED | OUTPATIENT
Start: 2023-08-02 | End: 2023-09-06

## 2023-08-02 RX ORDER — BROMFENAC SODIUM 100 %
1 POWDER (GRAM) MISCELLANEOUS 4 TIMES DAILY
Qty: 1 G | Refills: 0 | Status: SHIPPED | OUTPATIENT
Start: 2023-08-02 | End: 2023-09-06

## 2023-08-02 ASSESSMENT — VISUAL ACUITY
OS_SC+: -2
METHOD: SNELLEN - LINEAR
OS_SC: 20/30
OD_SC: 20/200

## 2023-08-02 ASSESSMENT — EXTERNAL EXAM - LEFT EYE: OS_EXAM: NORMAL

## 2023-08-02 ASSESSMENT — SLIT LAMP EXAM - LIDS
COMMENTS: GOOD COSMESIS
COMMENTS: NORMAL

## 2023-08-02 ASSESSMENT — EXTERNAL EXAM - RIGHT EYE: OD_EXAM: MOD BROW

## 2023-08-02 NOTE — LETTER
8/2/2023         RE: Mami Dumont  [address]  [address]  [place] [address]        Dear Colleague,    Thank you for referring your patient, Mami Dumont, to the [facility]. Please see a copy of my visit note below.     Current Eye Medications:  None     Subjective:  Pre-op, KPE/IOL right eye 8/14/23. Vision is very hazy and cloudy right eye. Vision is fine left eye. Noticed watering right eye for a while now, thought is was allergies. Avoids driving at night, because of glare. No eye pain in either eye.     Aspirin and Plavix     Objective:  See Ophthalmology Exam.       Assessment:  Mami Dumont is a 78 year old female who presents with:   Encounter Diagnoses   Name Primary?     Combined form of age-related cataract, right eye Cataract pre-op right eye. Dil 6.5. Possible Trypan. Myopic shift right eye. Anticipate targeting mostly distance right eye. Mild head tremor.        Pseudophakia, left eye Had surgery with Dr. Preciado on 12/28/20. Used AR40 +20.0D, target -.066D. Used Trypan. Mild tremor was ok.      Glaucoma suspect of both eyes        Plan:  PRE-OP CATARACT INSTRUCTIONS    ** [facility] will call you to collect your payment info and shipping address.    *Use the following drops in the right eye 4 times on the day before surgery and once the morning of surgery:                                  [email or web address]     *No solid food or drink after midnight on the morning of surgery. Any medications you would normally take in the morning, you can take AFTER surgery.    Kyle Ortiz MD  [phone]     Again, thank you for allowing me to participate in the care of your patient.        Sincerely,        Kyle Ortiz MD

## 2023-08-02 NOTE — PATIENT INSTRUCTIONS
PRE-OP CATARACT INSTRUCTIONS    ** [facility] will call you to collect your payment info and shipping address.    *Use the following drops in the right eye 4 times on the day before surgery and once the morning of surgery:                                  [email or web address]     *No solid food or drink after midnight on the morning of surgery. Any medications you would normally take in the morning, you can take AFTER surgery.    Kyle Ortiz MD  [phone]

## 2023-08-02 NOTE — PROGRESS NOTES
Current Eye Medications:  None     Subjective:  Pre-op, KPE/IOL right eye 8/14/23. Vision is very hazy and cloudy right eye. Vision is fine left eye. Noticed watering right eye for a while now, thought is was allergies. Avoids driving at night, because of glare. No eye pain in either eye.     Aspirin and Plavix     Objective:  See Ophthalmology Exam.       Assessment:  Mami Dumont is a 78 year old female who presents with:   Encounter Diagnoses   Name Primary?    Combined form of age-related cataract, right eye Cataract pre-op right eye. Dil 6.5. Possible Trypan. Myopic shift right eye. Anticipate targeting mostly distance right eye. Mild head tremor.       Pseudophakia, left eye Had surgery with Dr. Preciado on 12/28/20. Used AR40 +20.0D, target -.066D. Used Trypan. Mild tremor was ok.     Glaucoma suspect of both eyes        Plan:  PRE-OP CATARACT INSTRUCTIONS    ** [facility] will call [facility] to collect your payment info and shipping address.    *Use the following drops in the right eye 4 times on the day before surgery and once the morning of surgery:                                  [email or web address]     *No solid food or drink after midnight on the morning of surgery. Any medications you would normally take in the morning, you can take AFTER surgery.    Kyle Ortiz MD  [phone]

## 2023-08-03 ENCOUNTER — OFFICE VISIT (OUTPATIENT)
Dept: FAMILY MEDICINE | Facility: CLINIC | Age: 78
End: 2023-08-03
Payer: COMMERCIAL

## 2023-08-03 VITALS
OXYGEN SATURATION: 98 % | TEMPERATURE: 98 F | BODY MASS INDEX: 22.19 KG/M2 | SYSTOLIC BLOOD PRESSURE: 138 MMHG | HEIGHT: 60 IN | DIASTOLIC BLOOD PRESSURE: 70 MMHG | WEIGHT: 113 LBS | HEART RATE: 71 BPM | RESPIRATION RATE: 16 BRPM

## 2023-08-03 DIAGNOSIS — Z01.818 PREOP GENERAL PHYSICAL EXAM: Primary | ICD-10-CM

## 2023-08-03 DIAGNOSIS — H25.9 SENILE CATARACT OF RIGHT EYE, UNSPECIFIED AGE-RELATED CATARACT TYPE: ICD-10-CM

## 2023-08-03 PROCEDURE — 99214 OFFICE O/P EST MOD 30 MIN: CPT | Performed by: FAMILY MEDICINE

## 2023-08-03 ASSESSMENT — PAIN SCALES - GENERAL: PAINLEVEL: NO PAIN (0)

## 2023-08-03 NOTE — PROGRESS NOTES
[facility]  [address] [address]  Phone: [phone]  Primary Provider: Mabel Atkinson  Pre-op Performing Provider: MABEL ATKINSON      PREOPERATIVE EVALUATION:  Today's date: 8/3/2023    Mami Dumont is a 78 year old female who presents for a preoperative evaluation.      8/3/2023    12:39 PM   Additional Questions   Roomed by Donna   Accompanied by Self       Surgical Information:  Surgery/Procedure: RIGHT PHACOEMULSIFICATION, CATARACT, WITH INTRAOCULAR LENS IMPLANT   Surgery Location: [facility]  Surgeon: Dr. moses  Surgery Date: 8/14/23  Time of Surgery: TBD  Where patient plans to recover: At home with family  Fax number for surgical facility: Note does not need to be faxed, will be available electronically in Epic.    Assessment & Plan     The proposed surgical procedure is considered LOW risk.    Preop general physical exam  There is no contraindication for the procedure .      Senile cataract of right eye, unspecified age-related cataract type  Right cataract surgery             - No identified additional risk factors other than previously addressed    Antiplatelet or Anticoagulation Medication Instructions:   - Bleeding risk is low for this procedure (e.g. dental, skin, cataract).    Additional Medication Instructions:  Patient is to take all scheduled medications on the day of surgery    RECOMMENDATION:  APPROVAL GIVEN to proceed with proposed procedure, without further diagnostic evaluation.      Subjective       HPI related to upcoming procedure:   Mami Dumont is a 78 year old female who presents today for preop exam   She is going for right cataract surgery   Patient denies any chest pain, dyspnea at rest or with exertion, PND, orthopnea, peripheral edema, palpitations, lightheadedness or syncope.        8/3/2023    12:35 PM   Preop Questions   1. Have you ever had a heart attack or stroke? YES - TIA    2. Have you ever had  surgery on your heart or blood vessels, such as a stent placement, a coronary artery bypass, or surgery on an artery in your head, neck, heart, or legs? No   3. Do you have chest pain with activity? No   4. Do you have a history of  heart failure? No   5. Do you currently have a cold, bronchitis or symptoms of other infection? No   6. Do you have a cough, shortness of breath, or wheezing? No   7. Do you or anyone in your family have previous history of blood clots? No   8. Do you or does anyone in your family have a serious bleeding problem such as prolonged bleeding following surgeries or cuts? No   9. Have you ever had problems with anemia or been told to take iron pills? YES -    10. Have you had any abnormal blood loss such as black, tarry or bloody stools, or abnormal vaginal bleeding? No   11. Have you ever had a blood transfusion? No   12. Are you willing to have a blood transfusion if it is medically needed before, during, or after your surgery? Yes   13. Have you or any of your relatives ever had problems with anesthesia? No   14. Do you have sleep apnea, excessive snoring or daytime drowsiness? No   15. Do you have any artifical heart valves or other implanted medical devices like a pacemaker, defibrillator, or continuous glucose monitor? No   16. Do you have artificial joints? No   17. Are you allergic to latex? No       Health Care Directive:  Patient does not have a Health Care Directive or Living Will: Discussed advance care planning with patient; however, patient declined at this time.    Preoperative Review of :   reviewed - no record of controlled substances prescribed.      Status of Chronic Conditions:  See problem list for active medical problems.  Problems all longstanding and stable, except as noted/documented.  See ROS for pertinent symptoms related to these conditions.    Review of Systems  CONSTITUTIONAL: NEGATIVE for fever, chills, change in weight  INTEGUMENTARY/SKIN: NEGATIVE for  worrisome rashes, moles or lesions  EYES: NEGATIVE for vision changes or irritation  ENT/MOUTH: NEGATIVE for ear, mouth and throat problems  RESP: NEGATIVE for significant cough or SOB  CV: NEGATIVE for chest pain, palpitations or peripheral edema  GI: NEGATIVE for nausea, abdominal pain, heartburn, or change in bowel habits  : NEGATIVE for frequency, dysuria, or hematuria  MUSCULOSKELETAL: NEGATIVE for significant arthralgias or myalgia  NEURO: NEGATIVE for weakness, dizziness or paresthesias  ENDOCRINE: NEGATIVE for temperature intolerance, skin/hair changes  HEME: NEGATIVE for bleeding problems  PSYCHIATRIC: NEGATIVE for changes in mood or affect    Patient Active Problem List    Diagnosis Date Noted    Combined form of age-related cataract, right eye 06/27/2023     Priority: Medium    Pseudophakia, os 12/28/2020     Priority: Medium    Combined forms of age-related cataract, mild-mod, of right eye 12/05/2020     Priority: Medium    Bilateral low back pain with left-sided sciatica 07/21/2020     Priority: Medium    Internal hemorrhoids 10/16/2018     Priority: Medium    Coronary artery disease due to calcified coronary lesion 07/09/2017     Priority: Medium    Compression fracture of L1 lumbar vertebra, closed, initial encounter (H) 07/09/2017     Priority: Medium    Family history of premature CAD 07/09/2017     Priority: Medium    Glaucoma suspect of both eyes 03/14/2017     Priority: Medium    Essential tremor 01/23/2014     Priority: Medium    Hypertension goal BP (blood pressure) < 140/90 09/10/2013     Priority: Medium    Tubular adenoma of colon      Priority: Medium    Dermatochalasis, ou 05/23/2013     Priority: Medium    Brow ptosis, ou 05/23/2013     Priority: Medium    Hyperlipidemia LDL goal <160 04/24/2013     Priority: Medium    Heme positive stool 04/24/2013     Priority: Medium    CARDIOVASCULAR SCREENING; LDL GOAL LESS THAN 160 04/05/2013     Priority: Medium    Advanced directives,  counseling/discussion 04/03/2013     Priority: Medium     Advance Care Planning:   Form given Venecia Lu MA              Diagnostic skin and sensitization tests      Priority: Medium     1/99 skin tests: pos. for cat/DM/M/RW per Dr. Shah.       Allergic rhinitis due to animal dander      Priority: Medium    Seasonal allergic rhinitis      Priority: Medium    House dust mite allergy      Priority: Medium    Rhinitis, allergic to other allergen      Priority: Medium     IMO update changed this record. Please review for accuracy      Seasonal allergic conjunctivitis      Priority: Medium      Past Medical History:   Diagnosis Date    Allergic rhinitis due to animal dander 1/99 skin tests: pos. for cat/DM/M/RW per Dr. Shah.          Coronary artery disease due to calcified coronary lesion 7/9/2017    Diagnostic skin and sensitization tests     1/99 skin tests: pos. for cat/DM/M/RW per Dr. Shah.     House dust mite allergy     Hypertension goal BP (blood pressure) < 140/90 9/10/2013    Open angle with borderline findings, low risk 8/29/2013    Rhinitis, allergic to other allergen     Seasonal allergic conjunctivitis     Seasonal allergic rhinitis     hx of elevated BP on Sudafed, Pt told to discontinue Sudafed products by Dr. Taylor on 3/1/10 visit.     Tubular adenoma of colon 5/2013    colonoscopy due 5/2018     Past Surgical History:   Procedure Laterality Date    BLEPHAROPLASTY, BROW LIFT BILATERAL, COMBINED  05/02/2013    with snip punctoplasty,[place]    BLEPHAROPLASTY, BROW LIFT BILATERAL, COMBINED Bilateral 05/19/2017    Procedure: COMBINED BLEPHAROPLASTY, BROW LIFT BILATERAL;  BILATERAL UPPER LID BLEPHAROPLASTY WITH BROW PTOSIS ;  Surgeon: Faisal Alicea MD;  Location:  [facility]    CATARACT IOL, RT/LT      ENDOSCOPIC POLYPECTOMY NASAL  1960    nasal polyp removal    PHACOEMULSIFICATION WITH STANDARD INTRAOCULAR LENS IMPLANT Left 12/28/2020     Current Outpatient Medications   Medication Sig Dispense Refill     amLODIPine (NORVASC) 5 MG tablet Take 1 tablet (5 mg) by mouth daily 90 tablet 1    aspirin (ASA) 81 MG chewable tablet Take 81 mg by mouth      atorvastatin (LIPITOR) 20 MG tablet Take 20 mg by mouth      azelastine (ASTELIN) 0.1 % nasal spray Spray 1 spray into both nostrils 2 times daily (Patient taking differently: Spray 1 spray into both nostrils 2 times daily As needed) 30 mL 1    Bromfenac Sodium POWD 1 Bottle 4 times daily 1 drop four times a day in the operative eye starting 1 day before surgery. Use until the bottle runs out. 1 g 0    clopidogrel (PLAVIX) 75 MG tablet Take 75 mg by mouth      Dexamethasone Acetate POWD 1 Bottle 4 times daily 1 drop four times a day in the operative eye starting 1 day before surgery. Use until the bottle runs out. 1 g 0    FLONASE 50 MCG/ACT nasal spray Spray 2 sprays in nostril daily Brand necessary. Patient gets headache with generic. 16 g 3    loratadine-pseudoePHEDrine (CLARITIN-D 12-HOUR) 5-120 MG 12 hr tablet Take 1 tablet by mouth as needed Taking less than prior due to hypertension.      loratadine-pseudoePHEDrine (CLARITIN-D 24-HOUR)  MG 24 hr tablet Take 1 tablet by mouth as needed for allergies      methocarbamol (ROBAXIN) 500 MG tablet Take 1 tablet (500 mg) by mouth 4 times daily as needed for muscle spasms 30 tablet 0    Moxifloxacin HCl POWD 1 Bottle 4 times daily 1 drop four times a day in the operative eye starting 1 day before surgery. Use until the bottle runs out. 1 g 0    Multiple Vitamins-Minerals (CENTRUM PO) Take  by mouth. Takes 1 daily      naphazoline-pheniramine (NAPHCON A) SOLN ophthalmic solution Place 1-2 drops into both eyes as needed      pantoprazole (PROTONIX) 40 MG EC tablet       propranolol (INDERAL) 20 MG tablet Take 20 mg by mouth      tretinoin (RETIN-A) 0.025 % external cream Apply a pea size to entire face QD 45 g 4    VITAMIN E-200 PO Take  by mouth. Takes 1 daily         Allergies   Allergen Reactions    Penicillins  Shortness Of Breath and Hives    Ace Inhibitors      Head tremor    Erythromycin      Other reaction(s): Stomach Upset        Social History     Tobacco Use    Smoking status: Never    Smokeless tobacco: Never    Tobacco comments:     Lives in smoke free household   Substance Use Topics    Alcohol use: Not Currently     Comment: occassional     Family History   Problem Relation Age of Onset    Cancer Mother     Diabetes Mother     Hypertension Mother     Cerebrovascular Disease Mother     Glaucoma Mother 70        one eye-sudden  loss of vision upon waking    Hypertension Brother     Hypertension Brother     Glaucoma Brother     Thyroid Disease No family hx of     Macular Degeneration No family hx of      History   Drug Use No         Objective     BP (!) 152/69   Pulse 71   Temp 98  F (36.7  C) (Tympanic)   Resp 16   Ht 1.524 m (5')   Wt 51.3 kg (113 lb)   SpO2 98%   BMI 22.07 kg/m      Physical Exam    GENERAL APPEARANCE: healthy, alert and no distress     EYES: EOMI,  PERRL     HENT: ear canals and TM's normal and nose and mouth without ulcers or lesions     NECK: no adenopathy, no asymmetry, masses, or scars and thyroid normal to palpation     RESP: lungs clear to auscultation - no rales, rhonchi or wheezes     CV: regular rates and rhythm, normal S1 S2, no S3 or S4 and no murmur, click or rub     ABDOMEN:  soft, nontender, no HSM or masses and bowel sounds normal     MS: extremities normal- no gross deformities noted, no evidence of inflammation in joints, FROM in all extremities.     SKIN: no suspicious lesions or rashes     NEURO: Normal strength and tone, sensory exam grossly normal, mentation intact and speech normal     PSYCH: mentation appears normal. and affect normal/bright     LYMPHATICS: No cervical adenopathy    Recent Labs   Lab Test 04/14/22  1031      POTASSIUM 4.2   CR 0.89        Diagnostics:  No labs were ordered during this visit.   No EKG required for low risk surgery (cataract,  skin procedure, breast biopsy, etc).    Revised Cardiac Risk Index (RCRI):  The patient has the following serious cardiovascular risks for perioperative complications:   - Cerebrovascular Disease (TIA or CVA) = 1 point     RCRI Interpretation: 1 point: Class II (low risk - 0.9% complication rate)         Signed Electronically by: Bijan Atkinson MD  Copy of this evaluation report is provided to requesting physician.

## 2023-08-03 NOTE — PATIENT INSTRUCTIONS
Jone Rao MD (Physician)  Radiology Neuroradiology       For informational purposes only. Not to replace the advice of your health care provider. Copyright   ,  [facility]. All rights reserved. Clinically reviewed by Sharon Paniagua MD. [facility] [id number] - REV .  Preparing for Your Surgery  Getting started  A nurse will call you to review your health history and instructions. They will give you an arrival time based on your scheduled surgery time. Please be ready to share:  Your doctor's clinic name and phone number  Your medical, surgical, and anesthesia history  A list of allergies and sensitivities  A list of medicines, including herbal treatments and over-the-counter drugs  Whether the patient has a legal guardian (ask how to send us the papers in advance)  Please tell us if you're pregnant--or if there's any chance you might be pregnant. Some surgeries may injure a fetus (unborn baby), so they require a pregnancy test. Surgeries that are safe for a fetus don't always need a test, and you can choose whether to have one.   If you have a child who's having surgery, please ask for a copy of Preparing for Your Child's Surgery.    Preparing for surgery  Within 10 to 30 days of surgery: Have a pre-op exam (sometimes called an H&P, or History and Physical). This can be done at a clinic or pre-operative center.  If you're having a , you may not need this exam. Talk to your care team.  At your pre-op exam, talk to your care team about all medicines you take. If you need to stop any medicines before surgery, ask when to start taking them again.  We do this for your safety. Many medicines can make you bleed too much during surgery. Some change how well surgery (anesthesia) drugs work.  Call your insurance company to let them know you're having surgery. (If you don't have insurance, call [phone].)  Call your clinic if there's any change in your health. This includes  signs of a cold or flu (sore throat, runny nose, cough, rash, fever). It also includes a scrape or scratch near the surgery site.  If you have questions on the day of surgery, call your hospital or surgery center.  Eating and drinking guidelines  For your safety: Unless your surgeon tells you otherwise, follow the guidelines below.  Eat and drink as usual until 8 hours before you arrive for surgery. After that, no food or milk.  Drink clear liquids until 2 hours before you arrive. These are liquids you can see through, like water, Gatorade, and Propel Water. They also include plain black coffee and tea (no cream or milk), candy, and breath mints. You can spit out gum when you arrive.  If you drink alcohol: Stop drinking it the night before surgery.  If your care team tells you to take medicine on the morning of surgery, it's okay to take it with a sip of water.  Preventing infection  Shower or bathe the night before and morning of your surgery. Follow the instructions your clinic gave you. (If no instructions, use regular soap.)  Don't shave or clip hair near your surgery site. We'll remove the hair if needed.  Don't smoke or vape the morning of surgery. You may chew nicotine gum up to 2 hours before surgery. A nicotine patch is okay.  Note: Some surgeries require you to completely quit smoking and nicotine. Check with your surgeon.  Your care team will make every effort to keep you safe from infection. We will:  Clean our hands often with soap and water (or an alcohol-based hand rub).  Clean the skin at your surgery site with a special soap that kills germs.  Give you a special gown to keep you warm. (Cold raises the risk of infection.)  Wear special hair covers, masks, gowns and gloves during surgery.  Give antibiotic medicine, if prescribed. Not all surgeries need antibiotics.  What to bring on the day of surgery  Photo ID and insurance card  Copy of your health care directive, if you have one  Glasses and  hearing aids (bring cases)  You can't wear contacts during surgery  Inhaler and eye drops, if you use them (tell us about these when you arrive)  CPAP machine or breathing device, if you use them  A few personal items, if spending the night  If you have . . .  A pacemaker, ICD (cardiac defibrillator) or other implant: Bring the ID card.  An implanted stimulator: Bring the remote control.  A legal guardian: Bring a copy of the certified (court-stamped) guardianship papers.  Please remove any jewelry, including body piercings. Leave jewelry and other valuables at home.  If you're going home the day of surgery  You must have a responsible adult drive you home. They should stay with you overnight as well.  If you don't have someone to stay with you, and you aren't safe to go home alone, we may keep you overnight. Insurance often won't pay for this.  After surgery  If it's hard to control your pain or you need more pain medicine, please call your surgeon's office.  Questions?   If you have any questions for your care team, list them here: _________________________________________________________________________________________________________________________________________________________________________ ____________________________________ ____________________________________ ____________________________________    How to Take Your Medication Before Surgery  - Take all of your medications before surgery as usual

## 2023-08-10 ENCOUNTER — TELEPHONE (OUTPATIENT)
Dept: FAMILY MEDICINE | Facility: CLINIC | Age: 78
End: 2023-08-10
Payer: COMMERCIAL

## 2023-08-10 ENCOUNTER — ANESTHESIA EVENT (OUTPATIENT)
Dept: SURGERY | Facility: AMBULATORY SURGERY CENTER | Age: 78
End: 2023-08-10
Payer: COMMERCIAL

## 2023-08-10 NOTE — TELEPHONE ENCOUNTER
Routing to Dr. Ortiz    Patient calling.    She is wondering if she can take Tylenol the morning of her surgery as she needs it for her back?    She wants to know what she will get during surgery as her last cataract surgery she was given anesthesia and she does not want to be awake for this.     Please have team reach out to patient    Rosibel Naranjo RN

## 2023-08-10 NOTE — ANESTHESIA PREPROCEDURE EVALUATION
Anesthesia Pre-Procedure Evaluation    Patient: Mami Dumont   MRN: [id number] : 1945        Procedure : Procedure(s):  RIGHT PHACOEMULSIFICATION, CATARACT, WITH INTRAOCULAR LENS IMPLANT          Past Medical History:   Diagnosis Date    Allergic rhinitis due to animal dander  skin tests: pos. for cat/DM/M/RW per Dr. Shah.          Coronary artery disease due to calcified coronary lesion 2017    Diagnostic skin and sensitization tests      skin tests: pos. for cat/DM/M/RW per Dr. Shah.     House dust mite allergy     Hypertension goal BP (blood pressure) < 140/90 9/10/2013    Open angle with borderline findings, low risk 2013    Rhinitis, allergic to other allergen     Seasonal allergic conjunctivitis     Seasonal allergic rhinitis     hx of elevated BP on Sudafed, Pt told to discontinue Sudafed products by Dr. Taylor on 3/1/10 visit.     Tubular adenoma of colon 2013    colonoscopy due 2018      Past Surgical History:   Procedure Laterality Date    BLEPHAROPLASTY, BROW LIFT BILATERAL, COMBINED  2013    with snip punctoplasty,[place]    BLEPHAROPLASTY, BROW LIFT BILATERAL, COMBINED Bilateral 2017    Procedure: COMBINED BLEPHAROPLASTY, BROW LIFT BILATERAL;  BILATERAL UPPER LID BLEPHAROPLASTY WITH BROW PTOSIS ;  Surgeon: Faisal Alicea MD;  Location:  [facility]    CATARACT IOL, RT/LT      ENDOSCOPIC POLYPECTOMY NASAL  1960    nasal polyp removal    PHACOEMULSIFICATION WITH STANDARD INTRAOCULAR LENS IMPLANT Left 2020      Allergies   Allergen Reactions    Penicillins Shortness Of Breath and Hives    Ace Inhibitors      Head tremor    Erythromycin      Other reaction(s): Stomach Upset      Social History     Tobacco Use    Smoking status: Never    Smokeless tobacco: Never    Tobacco comments:     Lives in smoke free household   Substance Use Topics    Alcohol use: Not Currently     Comment: occassional      Wt Readings from Last 1 Encounters:   23 51.3 kg (113 lb)               OUTSIDE LABS:  CBC:   Lab Results   Component Value Date    WBC 10.0 12/15/2020    WBC 7.5 11/21/2013    HGB 13.3 12/15/2020    HGB 13.5 05/05/2017    HCT 39.6 12/15/2020    HCT 39.0 11/21/2013     12/15/2020     11/21/2013     BMP:   Lab Results   Component Value Date     04/14/2022     11/08/2019    POTASSIUM 4.2 04/14/2022    POTASSIUM 4.4 12/15/2020    CHLORIDE 101 04/14/2022    CHLORIDE 105 11/08/2019    CO2 26 04/14/2022    CO2 30 11/08/2019    BUN 10 04/14/2022    BUN 12 11/08/2019    CR 0.89 04/14/2022    CR 0.80 12/15/2020    GLC 96 04/14/2022    GLC 96 11/08/2019     COAGS: No results found for: PTT, INR, FIBR  POC: No results found for: BGM, HCG, HCGS  HEPATIC:   Lab Results   Component Value Date    ALBUMIN 3.9 10/07/2015    PROTTOTAL 6.8 10/07/2015    ALT 29 10/07/2015    AST 21 10/07/2015    ALKPHOS 67 10/07/2015    BILITOTAL 0.4 10/07/2015     OTHER:   Lab Results   Component Value Date    KOMAL 9.2 04/14/2022    PHOS 3.9 11/21/2013    TSH 1.35 11/21/2013       Anesthesia Plan    ASA Status:  2       Anesthesia Type: MAC.     - Reason for MAC: straight local not clinically adequate   Induction: Intravenous.           Consents          - Extended Intubation/Ventilatory Support Discussed: No.      - Patient is DNR/DNI Status: No     Use of blood products discussed: No .     Postoperative Care    Pain management: Multi-modal analgesia.   PONV prophylaxis: Ondansetron (or other 5HT-3)     Comments:                Murray Bui MD

## 2023-08-10 NOTE — TELEPHONE ENCOUNTER
I spoke with Mireya on the phone and let her know she can take tylenol the morning of surgery with a small sip of water if desired.

## 2023-08-14 ENCOUNTER — ANESTHESIA (OUTPATIENT)
Dept: SURGERY | Facility: AMBULATORY SURGERY CENTER | Age: 78
End: 2023-08-14
Payer: COMMERCIAL

## 2023-08-14 ENCOUNTER — HOSPITAL ENCOUNTER (OUTPATIENT)
Facility: AMBULATORY SURGERY CENTER | Age: 78
Discharge: HOME | End: 2023-08-14
Attending: STUDENT IN AN ORGANIZED HEALTH CARE EDUCATION/TRAINING PROGRAM | Admitting: STUDENT IN AN ORGANIZED HEALTH CARE EDUCATION/TRAINING PROGRAM
Payer: COMMERCIAL

## 2023-08-14 VITALS
BODY MASS INDEX: 21.87 KG/M2 | RESPIRATION RATE: 16 BRPM | HEART RATE: 62 BPM | SYSTOLIC BLOOD PRESSURE: 150 MMHG | TEMPERATURE: 97 F | OXYGEN SATURATION: 100 % | DIASTOLIC BLOOD PRESSURE: 47 MMHG | WEIGHT: 112 LBS

## 2023-08-14 DIAGNOSIS — H25.811 COMBINED FORM OF AGE-RELATED CATARACT, RIGHT EYE: Primary | ICD-10-CM

## 2023-08-14 PROCEDURE — G8907 PT DOC NO EVENTS ON DISCHARG: HCPCS

## 2023-08-14 PROCEDURE — G8918 PT W/O PREOP ORDER IV AB PRO: HCPCS

## 2023-08-14 PROCEDURE — 66984 XCAPSL CTRC RMVL W/O ECP: CPT | Mod: RT

## 2023-08-14 PROCEDURE — 66982 XCAPSL CTRC RMVL CPLX WO ECP: CPT | Mod: RT | Performed by: STUDENT IN AN ORGANIZED HEALTH CARE EDUCATION/TRAINING PROGRAM

## 2023-08-14 DEVICE — TECNIS 1-PC CLEAR MONO 6.0MM 20.5D
Type: IMPLANTABLE DEVICE | Site: EYE | Status: FUNCTIONAL
Brand: TECNIS IOL

## 2023-08-14 RX ORDER — LIDOCAINE 40 MG/G
CREAM TOPICAL
Status: DISCONTINUED | OUTPATIENT
Start: 2023-08-14 | End: 2023-08-15 | Stop reason: HOSPADM

## 2023-08-14 RX ORDER — PROPARACAINE HYDROCHLORIDE 5 MG/ML
1 SOLUTION/ DROPS OPHTHALMIC ONCE
Status: COMPLETED | OUTPATIENT
Start: 2023-08-14 | End: 2023-08-14

## 2023-08-14 RX ORDER — SODIUM CHLORIDE, SODIUM LACTATE, POTASSIUM CHLORIDE, CALCIUM CHLORIDE 600; 310; 30; 20 MG/100ML; MG/100ML; MG/100ML; MG/100ML
INJECTION, SOLUTION INTRAVENOUS CONTINUOUS
Status: DISCONTINUED | OUTPATIENT
Start: 2023-08-14 | End: 2023-08-15 | Stop reason: HOSPADM

## 2023-08-14 RX ORDER — METOPROLOL TARTRATE 1 MG/ML
1-2 INJECTION, SOLUTION INTRAVENOUS EVERY 5 MIN PRN
Status: DISCONTINUED | OUTPATIENT
Start: 2023-08-14 | End: 2023-08-15 | Stop reason: HOSPADM

## 2023-08-14 RX ORDER — FENTANYL CITRATE 50 UG/ML
INJECTION, SOLUTION INTRAMUSCULAR; INTRAVENOUS PRN
Status: DISCONTINUED | OUTPATIENT
Start: 2023-08-14 | End: 2023-08-14

## 2023-08-14 RX ORDER — FENTANYL CITRATE 50 UG/ML
50 INJECTION, SOLUTION INTRAMUSCULAR; INTRAVENOUS EVERY 5 MIN PRN
Status: DISCONTINUED | OUTPATIENT
Start: 2023-08-14 | End: 2023-08-15 | Stop reason: HOSPADM

## 2023-08-14 RX ORDER — MOXIFLOXACIN IN NACL,ISO-OS/PF 0.3MG/0.3
SYRINGE (ML) INTRAOCULAR PRN
Status: DISCONTINUED | OUTPATIENT
Start: 2023-08-14 | End: 2023-08-14 | Stop reason: HOSPADM

## 2023-08-14 RX ORDER — TETRACAINE HYDROCHLORIDE 5 MG/ML
SOLUTION OPHTHALMIC PRN
Status: DISCONTINUED | OUTPATIENT
Start: 2023-08-14 | End: 2023-08-14 | Stop reason: HOSPADM

## 2023-08-14 RX ORDER — OXYCODONE HYDROCHLORIDE 5 MG/1
10 TABLET ORAL EVERY 4 HOURS PRN
Status: DISCONTINUED | OUTPATIENT
Start: 2023-08-14 | End: 2023-08-15 | Stop reason: HOSPADM

## 2023-08-14 RX ORDER — CYCLOPENTOLAT/TROPIC/PHENYLEPH 1%-1%-2.5%
1 DROPS (EA) OPHTHALMIC (EYE)
Status: COMPLETED | OUTPATIENT
Start: 2023-08-14 | End: 2023-08-14

## 2023-08-14 RX ORDER — ACETAMINOPHEN 325 MG/1
975 TABLET ORAL ONCE
Status: DISCONTINUED | OUTPATIENT
Start: 2023-08-14 | End: 2023-08-15 | Stop reason: HOSPADM

## 2023-08-14 RX ORDER — HYDRALAZINE HYDROCHLORIDE 20 MG/ML
2.5-5 INJECTION INTRAMUSCULAR; INTRAVENOUS EVERY 10 MIN PRN
Status: DISCONTINUED | OUTPATIENT
Start: 2023-08-14 | End: 2023-08-15 | Stop reason: HOSPADM

## 2023-08-14 RX ORDER — ONDANSETRON 2 MG/ML
4 INJECTION INTRAMUSCULAR; INTRAVENOUS EVERY 30 MIN PRN
Status: DISCONTINUED | OUTPATIENT
Start: 2023-08-14 | End: 2023-08-15 | Stop reason: HOSPADM

## 2023-08-14 RX ORDER — FENTANYL CITRATE 50 UG/ML
25 INJECTION, SOLUTION INTRAMUSCULAR; INTRAVENOUS
Status: DISCONTINUED | OUTPATIENT
Start: 2023-08-14 | End: 2023-08-15 | Stop reason: HOSPADM

## 2023-08-14 RX ORDER — FENTANYL CITRATE 50 UG/ML
25 INJECTION, SOLUTION INTRAMUSCULAR; INTRAVENOUS EVERY 5 MIN PRN
Status: DISCONTINUED | OUTPATIENT
Start: 2023-08-14 | End: 2023-08-15 | Stop reason: HOSPADM

## 2023-08-14 RX ORDER — OXYCODONE HYDROCHLORIDE 5 MG/1
5 TABLET ORAL EVERY 4 HOURS PRN
Status: DISCONTINUED | OUTPATIENT
Start: 2023-08-14 | End: 2023-08-15 | Stop reason: HOSPADM

## 2023-08-14 RX ORDER — ONDANSETRON 4 MG/1
4 TABLET, ORALLY DISINTEGRATING ORAL EVERY 30 MIN PRN
Status: DISCONTINUED | OUTPATIENT
Start: 2023-08-14 | End: 2023-08-15 | Stop reason: HOSPADM

## 2023-08-14 RX ORDER — ACETAMINOPHEN 500 MG
500-1000 TABLET ORAL EVERY 6 HOURS PRN
COMMUNITY

## 2023-08-14 RX ORDER — SODIUM CHLORIDE, SODIUM LACTATE, POTASSIUM CHLORIDE, CALCIUM CHLORIDE 600; 310; 30; 20 MG/100ML; MG/100ML; MG/100ML; MG/100ML
INJECTION, SOLUTION INTRAVENOUS CONTINUOUS PRN
Status: DISCONTINUED | OUTPATIENT
Start: 2023-08-14 | End: 2023-08-14

## 2023-08-14 RX ADMIN — Medication 1 DROP: at 09:48

## 2023-08-14 RX ADMIN — Medication 1 DROP: at 09:39

## 2023-08-14 RX ADMIN — FENTANYL CITRATE 25 MCG: 50 INJECTION, SOLUTION INTRAMUSCULAR; INTRAVENOUS at 10:55

## 2023-08-14 RX ADMIN — PROPARACAINE HYDROCHLORIDE 1 DROP: 5 SOLUTION/ DROPS OPHTHALMIC at 09:32

## 2023-08-14 RX ADMIN — FENTANYL CITRATE 25 MCG: 50 INJECTION, SOLUTION INTRAMUSCULAR; INTRAVENOUS at 10:49

## 2023-08-14 RX ADMIN — SODIUM CHLORIDE, SODIUM LACTATE, POTASSIUM CHLORIDE, CALCIUM CHLORIDE: 600; 310; 30; 20 INJECTION, SOLUTION INTRAVENOUS at 10:47

## 2023-08-14 RX ADMIN — SODIUM CHLORIDE, SODIUM LACTATE, POTASSIUM CHLORIDE, CALCIUM CHLORIDE: 600; 310; 30; 20 INJECTION, SOLUTION INTRAVENOUS at 09:48

## 2023-08-14 RX ADMIN — Medication 1 DROP: at 09:32

## 2023-08-14 NOTE — ANESTHESIA POSTPROCEDURE EVALUATION
Patient: Mami Dumont    Procedure: Procedure(s):  RIGHT PHACOEMULSIFICATION, CATARACT, WITH INTRAOCULAR LENS IMPLANT       Anesthesia Type:  MAC    Note:  Disposition: Outpatient   Postop Pain Control: Uneventful            Sign Out: Well controlled pain   PONV: No   Neuro/Psych: Uneventful            Sign Out: Acceptable/Baseline neuro status   Airway/Respiratory: Uneventful            Sign Out: Acceptable/Baseline resp. status   CV/Hemodynamics: Uneventful            Sign Out: Acceptable CV status; No obvious hypovolemia; No obvious fluid overload   Other NRE:    DID A NON-ROUTINE EVENT OCCUR?            Last vitals:  Vitals Value Taken Time   BP     Temp     Pulse     Resp     SpO2         Electronically Signed By: Murray Bui MD  August 14, 2023  11:22 AM

## 2023-08-14 NOTE — DISCHARGE INSTRUCTIONS
CATARACT SURGERY POST-OP INSTRUCTIONS  Dr. Kyle Ortiz  [phone]      Continue using CatarActive3 four times a day in the operative eye.  You should get 3 drops in today and 4 drops daily starting tomorrow. Space them out about every 4 hours (or with meals and at bedtime).     Keep the eye shield taped in place unless putting drops in. We will remove it for you in the office tomorrow.    Light sensitivity may be noticed. Sunglasses may be worn for comfort.    Do not rub the operated eye.    Keep the operated eye dry. You may wash your hair, bathe or shower, but keep the operated eye closed while doing so.     No swimming, hot tub, or sauna for 2 weeks.    No make up around eye for 5 days.    No bending at the waist or lifting more than 10 pounds for one week.    May take Tylenol (per directions on bottle) for mild pain.    Call the office at [phone] and ask to speak to the on-call ophthalmologist  if any of the following should occur:  Any sudden vision changes  Nausea or severe headache  Increase in pain not controlled  Or signs of infection (pus, increasing redness or tenderness)      [facility]  Same-Day Surgery   Adult Discharge Orders & Instructions   For 24 hours after surgery  Get plenty of rest.  A responsible adult must stay with you for at least 24 hours after you leave the hospital.   Do not drive or use heavy equipment.  If you have weakness or tingling, don't drive or use heavy equipment until this feeling goes away.  Do not drink alcohol.  Avoid strenuous or risky activities.  Ask for help when climbing stairs.   You may feel lightheaded.  IF so, sit for a few minutes before standing.  Have someone help you get up.   If you have nausea (feel sick to your stomach): Drink only clear liquids such as apple juice, ginger ale, broth or 7-Up.  Rest may also help.  Be sure to drink enough fluids.  Move to a regular diet as you feel able.  You may have a slight fever. Call  the doctor if your fever is over 100 F (37.7 C) (taken under the tongue) or lasts longer than 24 hours.  You may have a dry mouth, a sore throat, muscle aches or trouble sleeping.  These should go away after 24 hours.  Do not make important or legal decisions.   Call your doctor for any of the followin.  Signs of infection (fever, growing tenderness at the surgery site, a large amount of drainage or bleeding, severe pain, foul-smelling drainage, redness, swelling).    2. It has been over 8 to 10 hours since surgery and you are still not able to urinate (pass water).    3.  Headache for over 24 hours.

## 2023-08-14 NOTE — OP NOTE
PreOp Diagnosis: Visually significant nuclear sclerotic cataract right eye, dense nuclear sclerosis necessitating Trypan Blue right eye  PostOp Diagnosis: Same  Surgeon: Kyle Ortiz MD  Implant: Tecnis [id number] +20.5D    Procedures:   1. Review of intraocular lens calculations, both eyes   2. Phacoemulsification and extraction of lens  right eye   3. Intraocular lens implantation right eye  Anesthesia: MAC/topical  Complications: None  EBL: <1cc    Mami Dumont suffers from a visually significant cataract of the right eye. This has caused problems with distance and reading vision, including glare. After discussing the risks, benefits, and alternatives, the patient wishes to proceed with cataract surgery.    The patient was identified in the pre-op area where the right eye was marked. The patient was then brought to the operating room where a time out was called, identifying the patient, the procedure, and the correct site. Tetracaine drops were applied to the operative eye. The operative eye was then prepped and draped in the usual sterile ophthalmic fashion. An eyelid speculum was placed into the operative eye. Additional tetracaine drops were applied. A paracentesis was made superior with a side port blade. 1% preservative-free lidocaine and epinephrine was injected into the anterior chamber. Due to obscured red reflex, trypan blue was irrigated into the anterior chamber to enhance capsular visualization.  This was irrigated from the anterior chamber after 60 seconds with balanced salt solution.   Endocoat was injected to deepen the anterior chamber. A 2.4mm clear corneal wound was created with a keratome blade temporally. A continuous curvilinear capsulorrhexis was started with a bent cystitome and completed with Utrada forceps. Hydrodissection of the lens nucleus was performed with BSS on a cannula. The lens nucleus was rotated. Phacoemulsification of the lens nucleus was accomplished in a  phacoemulsification stop and chop technique. Remaining cortex was removed with irrigation and aspiration. The lens capsule was noted to be intact. Healon was used to inflate the capsular bag.  The lens was injected into the capsular bag. Remaining viscoelastic was removed with irrigation and aspiration. The wounds were checked and found to be watertight after hydration.  Intracameral moxifloxacin was injected into the anterior chamber. The eyelid speculum was removed. The patient tolerated the procedure well and was in stable condition on the way to the recovery area.     Kyle Ortiz MD

## 2023-08-14 NOTE — ANESTHESIA CARE TRANSFER NOTE
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    Anesthesia Type:   MAC     Note:    Oropharynx: oropharynx clear of all foreign objects  Level of Consciousness: awake  Oxygen Supplementation: room air        Vital Signs Stable: post-procedure vital signs reviewed and stable  Report to RN Given: handoff report given  Patient transferred to: Phase II    Handoff Report: Identifed the Patient, Identified the Reponsible Provider, Reviewed the pertinent medical history, Discussed the surgical course, Reviewed Intra-OP anesthesia mangement and issues during anesthesia, Set expectations for post-procedure period and Allowed opportunity for questions and acknowledgement of understanding      Vitals:  Vitals Value Taken Time   BP     Temp     Pulse     Resp     SpO2         Electronically Signed By: ARUNA Che CRNA  August 14, 2023  11:17 AM

## 2023-08-15 ENCOUNTER — OFFICE VISIT (OUTPATIENT)
Dept: OPHTHALMOLOGY | Facility: CLINIC | Age: 78
End: 2023-08-15
Payer: COMMERCIAL

## 2023-08-15 DIAGNOSIS — Z96.1 PSEUDOPHAKIA: Primary | ICD-10-CM

## 2023-08-15 PROCEDURE — 99024 POSTOP FOLLOW-UP VISIT: CPT | Performed by: STUDENT IN AN ORGANIZED HEALTH CARE EDUCATION/TRAINING PROGRAM

## 2023-08-15 ASSESSMENT — TONOMETRY
IOP_METHOD: APPLANATION
OD_IOP_MMHG: 22
OS_IOP_MMHG: 18

## 2023-08-15 ASSESSMENT — SLIT LAMP EXAM - LIDS
COMMENTS: NORMAL
COMMENTS: GOOD COSMESIS

## 2023-08-15 ASSESSMENT — VISUAL ACUITY
OD_SC: 20/70
OD_SC+: -2
METHOD: SNELLEN - LINEAR
OD_PH_SC: 20/40
OS_SC+: -1
OS_SC: 20/30

## 2023-08-15 ASSESSMENT — EXTERNAL EXAM - LEFT EYE: OS_EXAM: NORMAL

## 2023-08-15 ASSESSMENT — EXTERNAL EXAM - RIGHT EYE: OD_EXAM: MOD BROW

## 2023-08-15 NOTE — PROGRESS NOTES
Current Eye Medications:  ***     Subjective:  Po1, KPE/IOL right eye 8/14/23.      Objective:  See Ophthalmology Exam.       Assessment:      Plan:   See Patient Instructions.

## 2023-08-15 NOTE — PATIENT INSTRUCTIONS
POST-OP CATARACT INSTRUCTIONS    *   Use the following drop(s) in the RIGHT EYE four times a day until the bottle(s) run out:        CatarActive 3 (dark blue top)    *   Wear eye shield when sleeping for one week. Do not rub the operated eye.     *   No bending or lifting more than 10 pounds for one week.    *   Keep water out of eye for two weeks.    *   OK to resume aspirin and/or other blood thinners if you stopped.     *   If your vision worsens, eye becomes increasingly red, or becomes painful, call [phone].     Kyle Ortiz M.D.

## 2023-08-15 NOTE — LETTER
8/15/2023         RE: Mami Dumont  [address]  [address]  [place] [address]        Dear Colleague,    Thank you for referring your patient, Mami Dumont, to the [facility]. Please see a copy of my visit note below.     Current Eye Medications:  CatarActive 3 (dark blue top) four times a day right eye.     Subjective:  Po1, KPE/IOL right eye 8/14/23. Patient slept great last night. Vision is improved right eye, doing great. Colors brighter right eye. No eye pain or discomfort in either eye.      Objective:  See Ophthalmology Exam.       Assessment:  Mami Dumont is a 78 year old female who presents with:   Encounter Diagnosis   Name Primary?     Pseudophakia - Both Eyes POD1 s/p CE/IOL right eye - doing well.        Plan:  POST-OP CATARACT INSTRUCTIONS    *   Use the following drop(s) in the RIGHT EYE four times a day until the bottle(s) run out:        CatarActive 3 (dark blue top)    *   Wear eye shield when sleeping for one week. Do not rub the operated eye.     *   No bending or lifting more than 10 pounds for one week.    *   Keep water out of eye for two weeks.    *   OK to resume aspirin and/or other blood thinners if you stopped.     *   If your vision worsens, eye becomes increasingly red, or becomes painful, call [phone].     Kyle Ortiz M.D.          Again, thank you for allowing me to participate in the care of your patient.        Sincerely,        Kyle Ortiz MD

## 2023-08-15 NOTE — PROGRESS NOTES
Current Eye Medications:  CatarActive 3 (dark blue top) four times a day right eye.     Subjective:  Po1, KPE/IOL right eye 8/14/23. Patient slept great last night. Vision is improved right eye, doing great. Colors brighter right eye. No eye pain or discomfort in either eye.      Objective:  See Ophthalmology Exam.       Assessment:  Mami Dumont is a 78 year old female who presents with:   Encounter Diagnosis   Name Primary?    Pseudophakia - Both Eyes POD1 s/p CE/IOL right eye - doing well.        Plan:  POST-OP CATARACT INSTRUCTIONS    *   Use the following drop(s) in the RIGHT EYE four times a day until the bottle(s) run out:        CatarActive 3 (dark blue top)    *   Wear eye shield when sleeping for one week. Do not rub the operated eye.     *   No bending or lifting more than 10 pounds for one week.    *   Keep water out of eye for two weeks.    *   OK to resume aspirin and/or other blood thinners if you stopped.     *   If your vision worsens, eye becomes increasingly red, or becomes painful, call [phone].     Kyle Ortiz M.D.

## 2023-08-17 ENCOUNTER — OFFICE VISIT (OUTPATIENT)
Dept: DERMATOLOGY | Facility: CLINIC | Age: 78
End: 2023-08-17
Payer: COMMERCIAL

## 2023-08-17 DIAGNOSIS — L57.0 ACTINIC KERATOSIS: Primary | ICD-10-CM

## 2023-08-17 DIAGNOSIS — D48.5 NEOPLASM OF UNCERTAIN BEHAVIOR OF SKIN: ICD-10-CM

## 2023-08-17 PROCEDURE — 11102 TANGNTL BX SKIN SINGLE LES: CPT | Performed by: PHYSICIAN ASSISTANT

## 2023-08-17 PROCEDURE — 17003 DESTRUCT PREMALG LES 2-14: CPT | Performed by: PHYSICIAN ASSISTANT

## 2023-08-17 PROCEDURE — 88305 TISSUE EXAM BY PATHOLOGIST: CPT | Performed by: DERMATOLOGY

## 2023-08-17 PROCEDURE — 99212 OFFICE O/P EST SF 10 MIN: CPT | Mod: 25 | Performed by: PHYSICIAN ASSISTANT

## 2023-08-17 PROCEDURE — 17000 DESTRUCT PREMALG LESION: CPT | Mod: 59 | Performed by: PHYSICIAN ASSISTANT

## 2023-08-17 NOTE — LETTER
8/17/2023         RE: Mami Dumont  [address]  [address]  [place] [address]        Dear Colleague,    Thank you for referring your patient, Mami Dumont, to the [facility]. Please see a copy of my visit note below.    HPI:   Chief complaints: Mami Dumont is a pleasant 78 year old female who presents for evaluation of red spots on the face. She has had pink scaly areas for awhile that do not want to heal. She has been treating them with moisturizers.       PHYSICAL EXAM:    There were no vitals taken for this visit.  Skin exam performed as follows: Type 2 skin. Mood appropriate  Alert and Oriented X 3. Well developed, well nourished in no distress.  General appearance: Normal  Head including face: Normal  Eyes: conjunctiva and lids: Normal  Mouth: Lips, teeth, gums: Normal  Neck: Normal  Skin: Scalp and body hair: See below    Pink gritty papules on the mid upper forehead x 1, right zygoma x 1, right cheek x 1, right nasal side wall x 1, right upper lip x 1, left upper lip x 1, left zygoma x 2, left cheek x 1    3 mm pearly papule on the mid upper forehead     ASSESSMENT/PLAN:     Actinic keratosis on the mid upper forehead x 1, right zygoma x 1, right cheek x 1, right nasal side wall x 1, right upper lip x 1, left upper lip x 1, left zygoma x 2, left cheek x 1. As precancerous, cryosurgery performed. Advised on blistering and post-op care. Advised if not resolved in 1-2 months to return for evaluation  R/o BCC on the mid upper forehead. Shave biopsy performed.  Area cleaned and anesthetized with 1% lidocaine with epinephrine.  Dermablade used to remove the lesion and sent to pathology. Bleeding was cauterized. Pt tolerated procedure well with no complications.   Facial rhytids - doing well on tretinoin cream. She will continue this.             Follow-up: pending path  CC:   Scribed By: Pamela Michelle, MS, PA-C      Again, thank you for allowing me to participate  in the care of your patient.        Sincerely,        Pamela Hu PA-C

## 2023-08-17 NOTE — PATIENT INSTRUCTIONS
Wound Care Instructions     FOR SUPERFICIAL WOUNDS     [facility] [phone]    [facility] [phone]        AFTER 24 HOURS YOU SHOULD REMOVE THE BANDAGE AND BEGIN DAILY DRESSING CHANGES AS FOLLOWS:     1) Remove Dressing.     2) Clean and dry the area with tap water using a Q-tip or sterile gauze pad.     3) Apply Vaseline, Aquaphor, Polysporin ointment or Bacitracin ointment over entire wound.  Do NOT use Neosporin ointment.     4) Cover the wound with a band-aid, or a sterile non-stick gauze pad and micropore paper tape      REPEAT THESE INSTRUCTIONS AT LEAST ONCE A DAY UNTIL THE WOUND HAS COMPLETELY HEALED.    It is an old wives tale that a wound heals better when it is exposed to air and allowed to dry out. The wound will heal faster with a better cosmetic result if it is kept moist with ointment and covered with a bandage.    **Do not let the wound dry out.**      Supplies Needed:      *Cotton tipped applicators (Q-tips)    *Polysporin Ointment or Bacitracin Ointment (NOT NEOSPORIN)    *Band-aids or non-stick gauze pads and micropore paper tape.      PATIENT INFORMATION:    During the healing process you will notice a number of changes. All wounds develop a small halo of redness surrounding the wound.  This means healing is occurring. Severe itching with extensive redness usually indicates sensitivity to the ointment or bandage tape used to dress the wound.  You should call our office if this develops.      Swelling  and/or discoloration around your surgical site is common, particularly when performed around the eye.    All wounds normally drain.  The larger the wound the more drainage there will be.  After 7-10 days, you will notice the wound beginning to shrink and new skin will begin to grow.  The wound is healed when you can see skin has formed over the entire area.  A healed wound has a healthy, shiny look to the surface and is red to dark pink in color to normalize.  Wounds  may take approximately 4-6 weeks to heal.  Larger wounds may take 6-8 weeks.  After the wound is healed you may discontinue dressing changes.    You may experience a sensation of tightness as your wound heals. This is normal and will gradually subside.    Your healed wound may be sensitive to temperature changes. This sensitivity improves with time, but if you re having a lot of discomfort, try to avoid temperature extremes.    Patients frequently experience itching after their wound appears to have healed because of the continue healing under the skin.  Plain Vaseline will help relieve the itching.        POSSIBLE COMPLICATIONS    BLEEDING:    Leave the bandage in place.  Use tightly rolled up gauze or a cloth to apply direct pressure over the bandage for 30  minutes.  Reapply pressure for an additional 30 minutes if necessary  Use additional gauze and tape to maintain pressure once the bleeding has stopped.

## 2023-08-17 NOTE — PROGRESS NOTES
HPI:   Chief complaints: Mami Dumont is a pleasant 78 year old female who presents for evaluation of red spots on the face. She has had pink scaly areas for awhile that do not want to heal. She has been treating them with moisturizers.       PHYSICAL EXAM:    There were no vitals taken for this visit.  Skin exam performed as follows: Type 2 skin. Mood appropriate  Alert and Oriented X 3. Well developed, well nourished in no distress.  General appearance: Normal  Head including face: Normal  Eyes: conjunctiva and lids: Normal  Mouth: Lips, teeth, gums: Normal  Neck: Normal  Skin: Scalp and body hair: See below    Pink gritty papules on the mid upper forehead x 1, right zygoma x 1, right cheek x 1, right nasal side wall x 1, right upper lip x 1, left upper lip x 1, left zygoma x 2, left cheek x 1    3 mm pearly papule on the mid upper forehead     ASSESSMENT/PLAN:     Actinic keratosis on the mid upper forehead x 1, right zygoma x 1, right cheek x 1, right nasal side wall x 1, right upper lip x 1, left upper lip x 1, left zygoma x 2, left cheek x 1. As precancerous, cryosurgery performed. Advised on blistering and post-op care. Advised if not resolved in 1-2 months to return for evaluation  R/o BCC on the mid upper forehead. Shave biopsy performed.  Area cleaned and anesthetized with 1% lidocaine with epinephrine.  Dermablade used to remove the lesion and sent to pathology. Bleeding was cauterized. Pt tolerated procedure well with no complications.   Facial rhytids - doing well on tretinoin cream. She will continue this.             Follow-up: pending path  CC:   Scribed By: Pamela Michelle, MS, PAGALO

## 2023-08-21 ENCOUNTER — TELEPHONE (OUTPATIENT)
Dept: DERMATOLOGY | Facility: CLINIC | Age: 78
End: 2023-08-21
Payer: COMMERCIAL

## 2023-08-21 LAB
PATH REPORT.COMMENTS IMP SPEC: ABNORMAL
PATH REPORT.COMMENTS IMP SPEC: ABNORMAL
PATH REPORT.COMMENTS IMP SPEC: YES
PATH REPORT.FINAL DX SPEC: ABNORMAL
PATH REPORT.GROSS SPEC: ABNORMAL
PATH REPORT.MICROSCOPIC SPEC OTHER STN: ABNORMAL
PATH REPORT.RELEVANT HX SPEC: ABNORMAL

## 2023-08-21 NOTE — LETTER
August 23, 2023    Mami Dumont  [address] [address]      Dear Mami      You are scheduled for Mohs Surgery on 9/12/23 at 7:30 am.     Please check in at 2nd floor [facility].     Be sure to eat a good breakfast and bathe and wash your hair prior to Surgery. Please bring  with you if this is above your neck    If you are taking any anti-coagulants that are prescribed by your Doctor (such as Coumadin/warfarin, Plavix, Aspirin, Ibuprofen), please continue taking them.     However, If you are taking anti-coagulants over the counter without  a Doctor's order for a Medical condition, please discontinue them 10 days prior to Surgery.      Please wear loose comfortable clothing as it could possibly be 4-6 hours until your surgery is completed depending upon how many layers of tissue need to be removed.     Thank you,    Abhishek Quintana MD

## 2023-08-21 NOTE — TELEPHONE ENCOUNTER
----- Message from Pamela Michelle PA-C sent at 8/21/2023  4:44 PM CDT -----  Mid upper forehead BCC please schedule mohs

## 2023-08-22 NOTE — TELEPHONE ENCOUNTER
Patient Contact    Attempt # 1    Was call answered?  No  Left message for patient and provided a call back number.       Meghan Morelos MA  [facility] Dermatology   [phone]

## 2023-08-23 NOTE — TELEPHONE ENCOUNTER
Called patient:  Patient notified and educated on test results   Mohs procedure explained- all questions answered  appointment scheduled- mohs packet mailed.     Thank you,  Gladys CHOWDHURY RN  Dermatology   [phone]

## 2023-09-06 ENCOUNTER — OFFICE VISIT (OUTPATIENT)
Dept: OPHTHALMOLOGY | Facility: CLINIC | Age: 78
End: 2023-09-06
Payer: COMMERCIAL

## 2023-09-06 DIAGNOSIS — H02.834 DERMATOCHALASIS OF BOTH UPPER EYELIDS: ICD-10-CM

## 2023-09-06 DIAGNOSIS — Z96.1 PSEUDOPHAKIA: Primary | ICD-10-CM

## 2023-09-06 DIAGNOSIS — H40.003 GLAUCOMA SUSPECT OF BOTH EYES: ICD-10-CM

## 2023-09-06 DIAGNOSIS — H02.831 DERMATOCHALASIS OF BOTH UPPER EYELIDS: ICD-10-CM

## 2023-09-06 DIAGNOSIS — I10 BENIGN ESSENTIAL HYPERTENSION: ICD-10-CM

## 2023-09-06 PROCEDURE — 99024 POSTOP FOLLOW-UP VISIT: CPT | Performed by: STUDENT IN AN ORGANIZED HEALTH CARE EDUCATION/TRAINING PROGRAM

## 2023-09-06 RX ORDER — AMLODIPINE BESYLATE 5 MG/1
5 TABLET ORAL DAILY
Qty: 90 TABLET | Refills: 1 | Status: SHIPPED | OUTPATIENT
Start: 2023-09-06 | End: 2024-04-16

## 2023-09-06 ASSESSMENT — REFRACTION_WEARINGRX
OD_SPHERE: +1.50
SPECS_TYPE: OTC READERS
OS_SPHERE: +1.50
OS_CYLINDER: SPHERE
OD_CYLINDER: SPHERE

## 2023-09-06 ASSESSMENT — SLIT LAMP EXAM - LIDS
COMMENTS: NORMAL
COMMENTS: GOOD COSMESIS

## 2023-09-06 ASSESSMENT — REFRACTION_MANIFEST
OS_CYLINDER: +0.25
OD_CYLINDER: +0.75
OD_AXIS: 150
OD_SPHERE: -0.25
OD_ADD: +2.50
OS_AXIS: 175
OS_ADD: +2.50
OS_SPHERE: -0.50

## 2023-09-06 ASSESSMENT — EXTERNAL EXAM - RIGHT EYE: OD_EXAM: MOD BROW

## 2023-09-06 ASSESSMENT — TONOMETRY
OD_IOP_MMHG: 15
IOP_METHOD: APPLANATION

## 2023-09-06 ASSESSMENT — VISUAL ACUITY
OD_SC+: -1+2
OD_SC: 20/25
METHOD: SNELLEN - LINEAR
OS_SC: 20/25
OS_SC+: -1

## 2023-09-06 ASSESSMENT — CUP TO DISC RATIO
OS_RATIO: 0.5
OD_RATIO: 0.4

## 2023-09-06 ASSESSMENT — EXTERNAL EXAM - LEFT EYE: OS_EXAM: NORMAL

## 2023-09-06 NOTE — LETTER
"    9/6/2023         RE: Mami Dumont  [address]  [address]  [place] [address]        Dear Colleague,    Thank you for referring your patient, Mami Dumont, to the [facility]. Please see a copy of my visit note below.     Current Eye Medications:  drops gone, but has Naphcon A for allergies     Subjective:  here for final MR right eye. Very pleased, no pain. Very dry eyes, but does take many allergy pills. Wondering if she can use Refresh again.      Objective:  See Ophthalmology Exam.       Assessment:  Mami Dumont is a 78 year old female who presents with:   Encounter Diagnoses   Name Primary?     Pseudophakia - Both Eyes Final MR right eye - well healed.     Glaucoma suspect of both eyes      Dermatochalasis of both upper eyelids        Plan:  Ok to resume artificial tears up to four times a day  in both eyes (Refresh Optive or Systane Balance. Avoid \"get the red out\" drops).     Continue allergy drops as needed     Continue over the counter readers or can fill glasses prescription given     Return to clinic in 1 year for a complete eye exam or earlier if problems should arise.    Kyle Ortiz M.D.  [phone]     Again, thank you for allowing me to participate in the care of your patient.        Sincerely,        Kyle Ortiz MD  "

## 2023-09-06 NOTE — PROGRESS NOTES
" Current Eye Medications:  drops gone, but has Naphcon A for allergies     Subjective:  here for final MR right eye. Very pleased, no pain. Very dry eyes, but does take many allergy pills. Wondering if she can use Refresh again.      Objective:  See Ophthalmology Exam.       Assessment:  Mami Dumont is a 78 year old female who presents with:   Encounter Diagnoses   Name Primary?    Pseudophakia - Both Eyes Final MR right eye - well healed.    Glaucoma suspect of both eyes     Dermatochalasis of both upper eyelids        Plan:  Ok to resume artificial tears up to four times a day  in both eyes (Refresh Optive or Systane Balance. Avoid \"get the red out\" drops).     Continue allergy drops as needed     Continue over the counter readers or can fill glasses prescription given     Return to clinic in 1 year for a complete eye exam or earlier if problems should arise.    Kyle Ortiz M.D.  [phone]     "

## 2023-09-06 NOTE — PATIENT INSTRUCTIONS
"Ok to resume artificial tears up to four times a day  in both eyes (Refresh Optive or Systane Balance. Avoid \"get the red out\" drops).     Continue allergy drops as needed     Continue over the counter readers or can fill glasses prescription given     Return to clinic in 1 year for a complete eye exam or earlier if problems should arise.    Kyle Ortiz M.D.  [phone]   "

## 2023-09-11 NOTE — PROGRESS NOTES
Surgical Office Location :   [facility]  [address] [place] [address]

## 2023-09-12 ENCOUNTER — OFFICE VISIT (OUTPATIENT)
Dept: DERMATOLOGY | Facility: CLINIC | Age: 78
End: 2023-09-12
Payer: COMMERCIAL

## 2023-09-12 DIAGNOSIS — C44.319 BASAL CELL CARCINOMA (BCC) OF FOREHEAD: Primary | ICD-10-CM

## 2023-09-12 PROCEDURE — 13132 CMPLX RPR F/C/C/M/N/AX/G/H/F: CPT | Performed by: DERMATOLOGY

## 2023-09-12 PROCEDURE — 17311 MOHS 1 STAGE H/N/HF/G: CPT | Performed by: DERMATOLOGY

## 2023-09-12 ASSESSMENT — PAIN SCALES - GENERAL: PAINLEVEL: NO PAIN (0)

## 2023-09-12 NOTE — PROGRESS NOTES
Mami Dumont is an extremely pleasant 78 year old year old female patient here today for evaluation and managment of basal cell carcinoma on mid upper forehead.  Patient has no other skin complaints today.  Remainder of the HPI, Meds, PMH, Allergies, FH, and SH was reviewed in chart.      Past Medical History:   Diagnosis Date    Allergic rhinitis due to animal dander 1/99 skin tests: pos. for cat/DM/M/RW per Dr. Shah.          Coronary artery disease due to calcified coronary lesion 7/9/2017    Diagnostic skin and sensitization tests     1/99 skin tests: pos. for cat/DM/M/RW per Dr. Shah.     House dust mite allergy     Hypertension goal BP (blood pressure) < 140/90 9/10/2013    Open angle with borderline findings, low risk 8/29/2013    Rhinitis, allergic to other allergen     Seasonal allergic conjunctivitis     Seasonal allergic rhinitis     hx of elevated BP on Sudafed, Pt told to discontinue Sudafed products by Dr. Taylor on 3/1/10 visit.     Tubular adenoma of colon 5/2013    colonoscopy due 5/2018       Past Surgical History:   Procedure Laterality Date    BLEPHAROPLASTY, BROW LIFT BILATERAL, COMBINED  05/02/2013    with snip punctoplasty,[place]    BLEPHAROPLASTY, BROW LIFT BILATERAL, COMBINED Bilateral 05/19/2017    Procedure: COMBINED BLEPHAROPLASTY, BROW LIFT BILATERAL;  BILATERAL UPPER LID BLEPHAROPLASTY WITH BROW PTOSIS ;  Surgeon: Faisal Alicea MD;  Location:  [facility]    CATARACT IOL, RT/LT      ENDOSCOPIC POLYPECTOMY NASAL  1960    nasal polyp removal    PHACOEMULSIFICATION CLEAR CORNEA WITH STANDARD INTRAOCULAR LENS IMPLANT Right 08/14/2023    Procedure: RIGHT PHACOEMULSIFICATION, CATARACT, WITH INTRAOCULAR LENS IMPLANT;  Surgeon: Kyle Ortiz MD;  Location:  OR    PHACOEMULSIFICATION WITH STANDARD INTRAOCULAR LENS IMPLANT Left 12/28/2020    REPAIR PTOSIS          Family History   Problem Relation Age of Onset    Cancer Mother     Diabetes Mother     Hypertension Mother      Cerebrovascular Disease Mother     Glaucoma Mother 70        one eye-sudden  loss of vision upon waking    Hypertension Brother     Hypertension Brother     Glaucoma Brother     Thyroid Disease No family hx of     Macular Degeneration No family hx of        Social History     Socioeconomic History    Marital status:      Spouse name: Not on file    Number of children: Not on file    Years of education: Not on file    Highest education level: Not on file   Occupational History    Not on file   Tobacco Use    Smoking status: Never    Smokeless tobacco: Never    Tobacco comments:     Lives in smoke free household   Vaping Use    Vaping Use: Never used   Substance and Sexual Activity    Alcohol use: Not Currently     Comment: occassional    Drug use: No    Sexual activity: Not Currently   Other Topics Concern    Parent/sibling w/ CABG, MI or angioplasty before 65F 55M? Not Asked   Social History Narrative    Not on file     Social Determinants of Health     Financial Resource Strain: Not on file   Food Insecurity: Not on file   Transportation Needs: Not on file   Physical Activity: Not on file   Stress: Not on file   Social Connections: Not on file   Intimate Partner Violence: Not on file   Housing Stability: Not on file       Outpatient Encounter Medications as of 9/12/2023   Medication Sig Dispense Refill    acetaminophen (TYLENOL) 500 MG tablet Take 500-1,000 mg by mouth every 6 hours as needed for mild pain      amLODIPine (NORVASC) 5 MG tablet Take 1 tablet (5 mg) by mouth daily 90 tablet 1    aspirin (ASA) 81 MG chewable tablet Take 81 mg by mouth      atorvastatin (LIPITOR) 20 MG tablet Take 20 mg by mouth      azelastine (ASTELIN) 0.1 % nasal spray Spray 1 spray into both nostrils 2 times daily (Patient taking differently: Spray 1 spray into both nostrils 2 times daily As needed) 30 mL 1    FLONASE 50 MCG/ACT nasal spray Spray 2 sprays in nostril daily Brand necessary. Patient gets headache with generic.  16 g 3    loratadine-pseudoePHEDrine (CLARITIN-D 12-HOUR) 5-120 MG 12 hr tablet Take 1 tablet by mouth as needed Taking less than prior due to hypertension.      loratadine-pseudoePHEDrine (CLARITIN-D 24-HOUR)  MG 24 hr tablet Take 1 tablet by mouth as needed for allergies      methocarbamol (ROBAXIN) 500 MG tablet Take 1 tablet (500 mg) by mouth 4 times daily as needed for muscle spasms 30 tablet 0    Multiple Vitamins-Minerals (CENTRUM PO) Take  by mouth. Takes 1 daily      naphazoline-pheniramine (NAPHCON A) SOLN ophthalmic solution Place 1-2 drops into both eyes as needed      pantoprazole (PROTONIX) 40 MG EC tablet       propranolol (INDERAL) 20 MG tablet Take 20 mg by mouth      tretinoin (RETIN-A) 0.025 % external cream Apply a pea size to entire face QD 45 g 4    VITAMIN E-200 PO Take  by mouth. Takes 1 daily      [DISCONTINUED] amLODIPine (NORVASC) 5 MG tablet Take 1 tablet (5 mg) by mouth daily 90 tablet 1    [DISCONTINUED] Bromfenac Sodium POWD 1 Bottle 4 times daily 1 drop four times a day in the operative eye starting 1 day before surgery. Use until the bottle runs out. 1 g 0    [DISCONTINUED] Dexamethasone Acetate POWD 1 Bottle 4 times daily 1 drop four times a day in the operative eye starting 1 day before surgery. Use until the bottle runs out. 1 g 0    [DISCONTINUED] Moxifloxacin HCl POWD 1 Bottle 4 times daily 1 drop four times a day in the operative eye starting 1 day before surgery. Use until the bottle runs out. 1 g 0     No facility-administered encounter medications on file as of 9/12/2023.             O:   NAD, WDWN, Alert & Oriented, Mood & Affect wnl, Vitals stable   Here today alone   General appearance normal   Vitals stable   Alert, oriented and in no acute distress     Mid upper forehead 5mm red macule      Eyes: Conjunctivae/lids:Normal     ENT: Lips, buccal mucosa, tongue: normal    MSK:Normal    Cardiovascular: peripheral edema none    Pulm: Breathing Normal    Neuro/Psych:  Orientation:Alert and Orientedx3 ; Mood/Affect:normal       A/P:  1.mid upper forehead basal cell carcinoma   MOHS:   Location    The rationale for Mohs surgery was discussed with the patient and consent was obtained.  The risks and benefits as well as alternatives to therapy were discussed, in detail.  Specifically, the risks of infection, scarring, bleeding, prolonged wound healing, incomplete removal, allergy to anesthesia, nerve injury and recurrence were addressed.  Indication for Mohs was Location. Prior to the procedure, the treatment site was clearly identified and, if available, confirmed with previous photos and confirmed by the patient   All components of the Universal Protocol/PAUSE rule were completed.  The Mohs surgeon operated in two distinct and integrated capacities as the surgeon and pathologist.      The area was prepped with Betasept.  A rim of normal appearing skin was marked circumferentially around the lesion.  The area was infiltrated with local anesthesia.  The tumor was first debulked to remove all clinically apparent tumor.  An incision following the standard Mohs approach was done and the specimen was oriented,mapped and placed in 1 block(s).  Each specimen was then chromacoded and processed in the Mohs laboratory using standard Mohs technique and submitted for frozen section histology.  Frozen section analysis showed no residual tumor but CLEAR MARGINS.      The tumor was excised using standard Mohs technique in 1 stages(s).  CLEAR MARGINS OBTAINED and Final defect size was 1.2 x 1 cm.     We discussed the options for wound management in full with the patient including risks/benefits/ possible outcomes.      REPAIR COMPLEX: Because of the tightness of the surrounding skin and Because of the size and full thickness nature of the defect, Because of the tightness of the surrounding skin, To maintain form and function, and In order to avoid distortion, a complex closure was planned. After ARY  anesthesia and prep, Burow's triangles were excised in the relaxed skin tension lines. The wound edges were widely undermined greater than width of the defect on both sides by dissection in the subcutaneous plane until adequate tissue mobility was obtained. Hemostasis was obtained. The wound edges were closed in a layered fashion using Vicryl and Fast Absorbing Plain Gut sutures. Postoperative length was 3.3 cm.   EBL minimal; complications none; wound care routine.  The patient was discharged in good condition and will return in one week for wound evaluation.    It was a pleasure speaking to Mami Dumont today.  Previous clinic notes and pertinent laboratory tests were reviewed prior to Mami Dumont's visit.  Signs and Symptoms of skin cancer discussed with patient.  Patient encouraged to perform monthly skin exams.  UV precautions reviewed with patient.  Risks of non-melanoma skin cancer discussed with patient   Return to clinic 6 months

## 2023-09-12 NOTE — LETTER
9/12/2023         RE: Mami Dumont  [address]  [address]  [facility] [address]        Dear Colleague,    Thank you for referring your patient, Mami Dumont, to the [facility]. Please see a copy of my visit note below.    Surgical Office Location :   [facility]  [address] [place] [address]      Mami Dumont is an extremely pleasant 78 year old year old female patient here today for evaluation and managment of basal cell carcinoma on mid upper forehead.  Patient has no other skin complaints today.  Remainder of the HPI, Meds, PMH, Allergies, FH, and SH was reviewed in chart.      Past Medical History:   Diagnosis Date     Allergic rhinitis due to animal dander 1/99 skin tests: pos. for cat/DM/M/RW per Dr. Shah.           Coronary artery disease due to calcified coronary lesion 7/9/2017     Diagnostic skin and sensitization tests     1/99 skin tests: pos. for cat/DM/M/RW per Dr. Shah.      House dust mite allergy      Hypertension goal BP (blood pressure) < 140/90 9/10/2013     Open angle with borderline findings, low risk 8/29/2013     Rhinitis, allergic to other allergen      Seasonal allergic conjunctivitis      Seasonal allergic rhinitis     hx of elevated BP on Sudafed, Pt told to discontinue Sudafed products by Dr. Taylor on 3/1/10 visit.      Tubular adenoma of colon 5/2013    colonoscopy due 5/2018       Past Surgical History:   Procedure Laterality Date     BLEPHAROPLASTY, BROW LIFT BILATERAL, COMBINED  05/02/2013    with snip punctoplasty,[place]     BLEPHAROPLASTY, BROW LIFT BILATERAL, COMBINED Bilateral 05/19/2017    Procedure: COMBINED BLEPHAROPLASTY, BROW LIFT BILATERAL;  BILATERAL UPPER LID BLEPHAROPLASTY WITH BROW PTOSIS ;  Surgeon: Faisal Alicea MD;  Location:  [facility]     CATARACT IOL, RT/LT       ENDOSCOPIC POLYPECTOMY NASAL  1960    nasal polyp removal     PHACOEMULSIFICATION CLEAR CORNEA WITH STANDARD INTRAOCULAR LENS  IMPLANT Right 08/14/2023    Procedure: RIGHT PHACOEMULSIFICATION, CATARACT, WITH INTRAOCULAR LENS IMPLANT;  Surgeon: Kyle Ortiz MD;  Location: [facility] OR     PHACOEMULSIFICATION WITH STANDARD INTRAOCULAR LENS IMPLANT Left 12/28/2020     REPAIR PTOSIS          Family History   Problem Relation Age of Onset     Cancer Mother      Diabetes Mother      Hypertension Mother      Cerebrovascular Disease Mother      Glaucoma Mother 70        one eye-sudden  loss of vision upon waking     Hypertension Brother      Hypertension Brother      Glaucoma Brother      Thyroid Disease No family hx of      Macular Degeneration No family hx of        Social History     Socioeconomic History     Marital status:      Spouse name: Not on file     Number of children: Not on file     Years of education: Not on file     Highest education level: Not on file   Occupational History     Not on file   Tobacco Use     Smoking status: Never     Smokeless tobacco: Never     Tobacco comments:     Lives in smoke free household   Vaping Use     Vaping Use: Never used   Substance and Sexual Activity     Alcohol use: Not Currently     Comment: occassional     Drug use: No     Sexual activity: Not Currently   Other Topics Concern     Parent/sibling w/ CABG, MI or angioplasty before 65F 55M? Not Asked   Social History Narrative     Not on file     Social Determinants of Health     Financial Resource Strain: Not on file   Food Insecurity: Not on file   Transportation Needs: Not on file   Physical Activity: Not on file   Stress: Not on file   Social Connections: Not on file   Intimate Partner Violence: Not on file   Housing Stability: Not on file       Outpatient Encounter Medications as of 9/12/2023   Medication Sig Dispense Refill     acetaminophen (TYLENOL) 500 MG tablet Take 500-1,000 mg by mouth every 6 hours as needed for mild pain       amLODIPine (NORVASC) 5 MG tablet Take 1 tablet (5 mg) by mouth daily 90 tablet 1     aspirin (ASA) 81  MG chewable tablet Take 81 mg by mouth       atorvastatin (LIPITOR) 20 MG tablet Take 20 mg by mouth       azelastine (ASTELIN) 0.1 % nasal spray Spray 1 spray into both nostrils 2 times daily (Patient taking differently: Spray 1 spray into both nostrils 2 times daily As needed) 30 mL 1     FLONASE 50 MCG/ACT nasal spray Spray 2 sprays in nostril daily Brand necessary. Patient gets headache with generic. 16 g 3     loratadine-pseudoePHEDrine (CLARITIN-D 12-HOUR) 5-120 MG 12 hr tablet Take 1 tablet by mouth as needed Taking less than prior due to hypertension.       loratadine-pseudoePHEDrine (CLARITIN-D 24-HOUR)  MG 24 hr tablet Take 1 tablet by mouth as needed for allergies       methocarbamol (ROBAXIN) 500 MG tablet Take 1 tablet (500 mg) by mouth 4 times daily as needed for muscle spasms 30 tablet 0     Multiple Vitamins-Minerals (CENTRUM PO) Take  by mouth. Takes 1 daily       naphazoline-pheniramine (NAPHCON A) SOLN ophthalmic solution Place 1-2 drops into both eyes as needed       pantoprazole (PROTONIX) 40 MG EC tablet        propranolol (INDERAL) 20 MG tablet Take 20 mg by mouth       tretinoin (RETIN-A) 0.025 % external cream Apply a pea size to entire face QD 45 g 4     VITAMIN E-200 PO Take  by mouth. Takes 1 daily       [DISCONTINUED] amLODIPine (NORVASC) 5 MG tablet Take 1 tablet (5 mg) by mouth daily 90 tablet 1     [DISCONTINUED] Bromfenac Sodium POWD 1 Bottle 4 times daily 1 drop four times a day in the operative eye starting 1 day before surgery. Use until the bottle runs out. 1 g 0     [DISCONTINUED] Dexamethasone Acetate POWD 1 Bottle 4 times daily 1 drop four times a day in the operative eye starting 1 day before surgery. Use until the bottle runs out. 1 g 0     [DISCONTINUED] Moxifloxacin HCl POWD 1 Bottle 4 times daily 1 drop four times a day in the operative eye starting 1 day before surgery. Use until the bottle runs out. 1 g 0     No facility-administered encounter medications on file  as of 9/12/2023.             O:   NAD, WDWN, Alert & Oriented, Mood & Affect wnl, Vitals stable   Here today alone   General appearance normal   Vitals stable   Alert, oriented and in no acute distress     Mid upper forehead 5mm red macule      Eyes: Conjunctivae/lids:Normal     ENT: Lips, buccal mucosa, tongue: normal    MSK:Normal    Cardiovascular: peripheral edema none    Pulm: Breathing Normal    Neuro/Psych: Orientation:Alert and Orientedx3 ; Mood/Affect:normal       A/P:  1.mid upper forehead basal cell carcinoma   MOHS:   Location    The rationale for Mohs surgery was discussed with the patient and consent was obtained.  The risks and benefits as well as alternatives to therapy were discussed, in detail.  Specifically, the risks of infection, scarring, bleeding, prolonged wound healing, incomplete removal, allergy to anesthesia, nerve injury and recurrence were addressed.  Indication for Mohs was Location. Prior to the procedure, the treatment site was clearly identified and, if available, confirmed with previous photos and confirmed by the patient   All components of the Universal Protocol/PAUSE rule were completed.  The Mohs surgeon operated in two distinct and integrated capacities as the surgeon and pathologist.      The area was prepped with Betasept.  A rim of normal appearing skin was marked circumferentially around the lesion.  The area was infiltrated with local anesthesia.  The tumor was first debulked to remove all clinically apparent tumor.  An incision following the standard Mohs approach was done and the specimen was oriented,mapped and placed in 1 block(s).  Each specimen was then chromacoded and processed in the Mohs laboratory using standard Mohs technique and submitted for frozen section histology.  Frozen section analysis showed no residual tumor but CLEAR MARGINS.      The tumor was excised using standard Mohs technique in 1 stages(s).  CLEAR MARGINS OBTAINED and Final defect size was 1.2  x 1 cm.     We discussed the options for wound management in full with the patient including risks/benefits/ possible outcomes.      REPAIR COMPLEX: Because of the tightness of the surrounding skin and Because of the size and full thickness nature of the defect, Because of the tightness of the surrounding skin, To maintain form and function, and In order to avoid distortion, a complex closure was planned. After LE anesthesia and prep, Burow's triangles were excised in the relaxed skin tension lines. The wound edges were widely undermined greater than width of the defect on both sides by dissection in the subcutaneous plane until adequate tissue mobility was obtained. Hemostasis was obtained. The wound edges were closed in a layered fashion using Vicryl and Fast Absorbing Plain Gut sutures. Postoperative length was 3.3 cm.   EBL minimal; complications none; wound care routine.  The patient was discharged in good condition and will return in one week for wound evaluation.    It was a pleasure speaking to Mami Dumont today.  Previous clinic notes and pertinent laboratory tests were reviewed prior to Mami Dumont's visit.  Signs and Symptoms of skin cancer discussed with patient.  Patient encouraged to perform monthly skin exams.  UV precautions reviewed with patient.  Risks of non-melanoma skin cancer discussed with patient   Return to clinic 6 months        Again, thank you for allowing me to participate in the care of your patient.        Sincerely,        Abhishek Quintana MD

## 2023-09-12 NOTE — PATIENT INSTRUCTIONS
Sutured Wound Care     [facility]: [phone]    [facility]: [phone]          No strenuous activity for 48 hours. Resume moderate activity in 48 hours. No heavy exercising until you are seen for follow up in one week.     Take Tylenol as needed for discomfort.                         Do not drink alcoholic beverages for 48 hours.     Keep the pressure bandage in place for 24 hours. If the bandage becomes blood tinged or loose, reinforce it with gauze and tape.        (Refer to the reverse side of this page for management of bleeding).    Remove pressure bandage in 24 hours (White Tape)    Leave the flat bandage in place until your follow up appointment. (Brown Tape)     Keep the bandage dry. Wash around it carefully.    If the tape becomes soiled or starts to come off, reinforce it with additional paper tape.    Do not smoke for 3 weeks; smoking is detrimental to wound healing.    It is normal to have swelling and bruising around the surgical site. The bruising will fade in approximately 10-14 days. Elevate the area to reduce swelling.    Numbness, itchiness and sensitivity to temperature changes can occur after surgery and may take up to 18 months to normalize.      POSSIBLE COMPLICATIONS    BLEEDING:    Leave the bandage in place.  Use tightly rolled up gauze or a cloth to apply direct pressure over the bandage for 20   minutes.  Reapply pressure for an additional 20 minutes if necessary  Call the office or go to the nearest emergency room if pressure fails to stop the bleeding.  Use additional gauze and tape to maintain pressure once the bleeding has stopped.        PAIN:    Post operative pain should slowly get better, never worse.  A severe increase in pain may indicate a problem. Call the office if this occurs.    In case of emergency phone:Dr Quintana [phone]             Proper skin care from [facility]:    -Eliminate harsh soaps as they strip the natural oils from  the skin, often resulting in dry itchy skin ( i.e. Dial, Zest, [place])  -Use mild soaps such as Cetaphil or Dove Sensitive Skin in the shower. You do not need to use soap on arms, legs, and trunk every time you shower unless visibly soiled.   -Avoid hot or cold showers.  -After showering, lightly dry off and apply moisturizer within 2-3 minutes. This will help trap moisture in the skin.   -Aggressive use of a moisturizer at least 1-2 times a day to the entire body (including -Vanicream, Cetaphil, Aquaphor or Cerave) and moisturize hands after every washing.  -We recommend using moisturizers that come in a tub that needs to be scooped out, not a pump. This has more of an oil base. It will hold moisture in your skin much better than a water base moisturizer. The above recommended are non-pore clogging.      Wear a sunscreen with at least SPF 30 on your face, ears, neck and V of the chest daily. Wear sunscreen on other areas of the body if those areas are exposed to the sun throughout the day. Sunscreens can contain physical and/or chemical blockers. Physical blockers are less likely to clog pores, these include zinc oxide and titanium dioxide. Reapply every two hour and after swimming.     Sunscreen examples: https://www.ewg.org/sunscreen/    UV radiation  UVA radiation remains constant throughout the day and throughout the year. It is a longer wavelength than UVB and therefore penetrates deeper into the skin leading to immediate and delayed tanning, photoaging, and skin cancer. 70-80% of UVA and UVB radiation occurs between the hours of 10am-2pm.  UVB radiation  UVB radiation causes the most harmful effects and is more significant during the summer months. However, snow and ice can reflect UVB radiation leading to skin damage during the winter months as well. UVB radiation is responsible for tanning, burning, inflammation, delayed erythema (pinkness), pigmentation (brown spots), and skin cancer.     I recommend  self monthly full body exams and yearly full body exams with a dermatology provider. If you develop a new or changing lesion please follow up for examination. Most skin cancers are pink and scaly or pink and pearly. However, we do see blue/brown/black skin cancers.  Consider the ABCDEs of melanoma when giving yourself your monthly full body exam ( don't forget the groin, buttocks, feet, toes, etc). A-asymmetry, B-borders, C-color, D-diameter, E-elevation or evolving. If you see any of these changes please follow up in clinic. If you cannot see your back I recommend purchasing a hand held mirror to use with a larger wall mirror.

## 2023-09-19 ENCOUNTER — ALLIED HEALTH/NURSE VISIT (OUTPATIENT)
Dept: DERMATOLOGY | Facility: CLINIC | Age: 78
End: 2023-09-19
Payer: COMMERCIAL

## 2023-09-19 DIAGNOSIS — Z48.00 ENCOUNTER FOR CHANGE OF DRESSING: Primary | ICD-10-CM

## 2023-09-19 PROCEDURE — 99207 PR NO CHARGE NURSE ONLY: CPT

## 2023-09-19 NOTE — PATIENT INSTRUCTIONS
WOUND CARE INSTRUCTIONS  for  ONE WEEK AFTER SURGERY          Leave flat bandage on your skin for one week after today s bandage change.  On 9/26/2023 when you remove the bandage, you may resume your regular skin care routine, including washing with mild soap and water, applying moisturizer, make-up and sunscreen.    If there are any open or bleeding areas at the incision/graft site you should begin to cover the area with a bandage daily as follows:    Clean and dry the area with plain tap water using a Q-tip or sterile gauze pad.  Apply Polysporin or Bacitracin ointment to the open area.  Cover the wound with a band-aid or a sterile non-stick gauze pad and micropore paper tape.         SIGNS OF INFECTION  - If you notice any of these signs of infection, call your doctor right away: expanding redness around the wound.  - Yellow or greenish-colored pus or cloudy wound drainage.    - Red streaking spreading from the wound.  - Increased swelling, tenderness, or pain around the wound.   - Fever.    Please remember that yellow and clear drainage from a wound can be normal and related to normal wound healing.  Isolated drainage from a wound without a combination of the above features does not indicate infection.       *Once the bandages are removed, the scar will be red and firm (especially in the lip/chin area). This is normal and will fade in time. It might take 6-12 months for this to happen.     *Massaging the area will help the scar soften and fade quicker. Begin to massage the area one month after the bandages have been removed. To massage apply pressure directly and firmly over the scar with the fingertips and move in a circular motion. Massage the area for a few minutes several times a day. Continue to massage the site for several months.    *Approximately 6-8 weeks after surgery it is not uncommon to see the formation of  tender pimple-like  bump along the scar. This is normal. As the scar continues to mature and  the stitches underneath the skin begin to dissolve, this might occur. Do not pick or squeeze, this will resolve on it s own. Should one break open producing a small amount of drainage, apply Polysporin or Bacitracin ointment a few times a day until the wound is completely healed.    *Numbness in the surgical area is expected. It might take 12-18 months for the feeling to return to normal. During this time sensations of itchiness, tingling and occasional sharp pains might be noted. These feelings are normal and will subside once the nerves have completely healed.         IN CASE OF EMERGENCY: Dr Quintana [phone]     If you were seen in [place] call: [phone]    If you were seen in [place] call: [phone]

## 2023-09-19 NOTE — PROGRESS NOTES
Mami Dumont comes into clinic today at the request of Mariano Ordering Provider for Wound Check Action taken: See Below.    This service provided today was under the supervising provider of the soumya Quintana, who was available if needed.    Pt returned to clinic for post surgery 1 week follow up bandage change. Pt has no complaints, denies pain. Bandage removed from Forehead, area cleansed with normal saline. Site is healing and wound edges approximating well. Reapplied new steri strips and paper tape.    Advised to watch for signs/sx of infection; spreading redness, drainage, odor, fever. Call or report promptly to clinic. Pt given written instructions and informed to rtc as needed. Patient verbalized understanding.     Donna Machado MA

## 2023-10-16 ENCOUNTER — ANCILLARY PROCEDURE (OUTPATIENT)
Dept: MAMMOGRAPHY | Facility: CLINIC | Age: 78
End: 2023-10-16
Attending: FAMILY MEDICINE
Payer: COMMERCIAL

## 2023-10-16 DIAGNOSIS — Z12.31 VISIT FOR SCREENING MAMMOGRAM: ICD-10-CM

## 2023-10-16 PROCEDURE — 77067 SCR MAMMO BI INCL CAD: CPT | Mod: TC | Performed by: STUDENT IN AN ORGANIZED HEALTH CARE EDUCATION/TRAINING PROGRAM

## 2023-11-06 ENCOUNTER — ANCILLARY PROCEDURE (OUTPATIENT)
Dept: MAMMOGRAPHY | Facility: CLINIC | Age: 78
End: 2023-11-06
Attending: FAMILY MEDICINE
Payer: COMMERCIAL

## 2023-11-06 DIAGNOSIS — R92.8 ABNORMAL MAMMOGRAM: ICD-10-CM

## 2023-11-06 PROCEDURE — G0279 TOMOSYNTHESIS, MAMMO: HCPCS

## 2023-11-06 PROCEDURE — 77065 DX MAMMO INCL CAD UNI: CPT | Mod: LT

## 2023-11-21 DIAGNOSIS — I63.9 CEREBROVASCULAR ACCIDENT (CVA), UNSPECIFIED MECHANISM (H): Primary | ICD-10-CM

## 2023-11-21 RX ORDER — ATORVASTATIN CALCIUM 20 MG/1
20 TABLET, FILM COATED ORAL AT BEDTIME
Qty: 90 TABLET | Refills: 0 | Status: SHIPPED | OUTPATIENT
Start: 2023-11-21 | End: 2024-05-13

## 2023-11-29 ENCOUNTER — NURSE TRIAGE (OUTPATIENT)
Dept: FAMILY MEDICINE | Facility: CLINIC | Age: 78
End: 2023-11-29

## 2023-11-29 NOTE — TELEPHONE ENCOUNTER
Called and spoke with patient. Relayed provider recommendations, as noted.  Patient voiced understanding and agreed to do an E-visit through her [employer] page.      Lolis Jeffrey RN  Clinical Triage/Primary Care  [facility]

## 2023-11-29 NOTE — TELEPHONE ENCOUNTER
She can do E visit   If not telephone or video visit with next available provider   Bijan Atkinson MD.

## 2023-11-30 ENCOUNTER — OFFICE VISIT (OUTPATIENT)
Dept: URGENT CARE | Facility: URGENT CARE | Age: 78
End: 2023-11-30
Payer: COMMERCIAL

## 2023-11-30 VITALS
DIASTOLIC BLOOD PRESSURE: 78 MMHG | TEMPERATURE: 97.2 F | OXYGEN SATURATION: 99 % | RESPIRATION RATE: 16 BRPM | WEIGHT: 111.7 LBS | HEART RATE: 76 BPM | BODY MASS INDEX: 21.81 KG/M2 | SYSTOLIC BLOOD PRESSURE: 156 MMHG

## 2023-11-30 DIAGNOSIS — J01.01 ACUTE RECURRENT MAXILLARY SINUSITIS: Primary | ICD-10-CM

## 2023-11-30 DIAGNOSIS — I10 HYPERTENSION GOAL BP (BLOOD PRESSURE) < 140/90: ICD-10-CM

## 2023-11-30 DIAGNOSIS — J30.2 SEASONAL ALLERGIC RHINITIS, UNSPECIFIED TRIGGER: ICD-10-CM

## 2023-11-30 PROCEDURE — 99214 OFFICE O/P EST MOD 30 MIN: CPT | Performed by: EMERGENCY MEDICINE

## 2023-11-30 RX ORDER — DOXYCYCLINE 100 MG/1
100 CAPSULE ORAL 2 TIMES DAILY
Qty: 14 CAPSULE | Refills: 0 | Status: SHIPPED | OUTPATIENT
Start: 2023-11-30 | End: 2023-12-07

## 2023-11-30 RX ORDER — AZELASTINE 1 MG/ML
1 SPRAY, METERED NASAL 2 TIMES DAILY
Qty: 30 ML | Refills: 0 | Status: SHIPPED | OUTPATIENT
Start: 2023-11-30

## 2023-11-30 ASSESSMENT — PAIN SCALES - GENERAL: PAINLEVEL: SEVERE PAIN (6)

## 2023-11-30 NOTE — PROGRESS NOTES
Assessment & Plan     Diagnosis:    ICD-10-CM    1. Acute recurrent maxillary sinusitis  J01.01 doxycycline hyclate (VIBRAMYCIN) 100 MG capsule      2. Seasonal allergic rhinitis, unspecified trigger  J30.2 azelastine (ASTELIN) 0.1 % nasal spray      3. Hypertension goal BP (blood pressure) < 140/90  I10     continue your propranolol. Stop taking sudafed and use Mucinex instead.          Medical Decision Making:  Mami Dumont is a 78 year old female who presents to clinic today for evaluation of facial pain/pressure.  Signs and symptoms are consistent with sinusitis.  Discussed viral vs bacterial sinusitis with the patient.  URI symptoms noted include cough and ear pain. Has of seasonal allergies to suggest allergic rhinitis. Given duration of symptoms and worsening with persistent fever (took Tylenol before arrival), I discussed antibiotics for bacterial sinusitis. Will have her try Astelin nasal spray and other OTC medication x24-48 hours and if no improvement can start antibiotics at that time. Outpatient medications ordered as noted above.  No evidence of fungal sinusitis, meningitis, encephalitis, cavernous sinus thrombosis, ocular pathology, intracerebral bleed, serious bacterial infection otherwise.  Supportive outpatient management indicated.  Patient verbalizes understanding and agreement with the plan including reasons to go to the ER. All questions answered.     CLAUDIA Meek [facility]    Subjective   HPI    Mami Dumont is a 78 year old female who presents to clinic today for the following health issues:  Chief Complaint   Patient presents with    Sinus Problem     Patient reports for the last 7-8 days she has had slight cough, yellow and green nasal discharge, fevers of 101, fatigue, congestion, phlegm, chills, headache and sore/tender sinuses    Cough     Denies any chest pain, shortness of breath, vision changes, neck pain or stiffness, other concerns. Has been  trying all her allergy medications and Flonase with no relief.       Review of Systems    See HPI    Objective      Vitals: BP (!) 156/78   Pulse 76   Temp 97.2  F (36.2  C) (Tympanic)   Resp 16   Wt 50.7 kg (111 lb 11.2 oz)   SpO2 99%   BMI 21.81 kg/m      Patient Vitals for the past 24 hrs:   BP Temp Temp src Pulse Resp SpO2 Weight   11/30/23 1228 (!) 156/78 -- -- -- -- -- --   11/30/23 1143 (!) 168/76 97.2  F (36.2  C) Tympanic 76 16 99 % 50.7 kg (111 lb 11.2 oz)       Vital signs reviewed by: Ko Jewell PA-C    Physical Exam   Constitutional: Patient is alert and cooperative. No acute distress.  HENT:   Right Ear: External ear normal. TM is normal.  Left Ear: External ear normal. TM is normal.  Nose: Nasal turbinates are erythematous and swollen. Yellow rhinorrhea noted. No septal mass or epistaxis.  Mouth: Normal tongue and tonsil. Posterior oropharynx is clear.  Eyes: Conjunctivae, EOMI and lids are normal.  Cardiovascular: Regular rate and rhythm  Pulmonary/Chest: Lungs are clear to auscultation throughout. Effort normal. No respiratory distress. No wheezes, rales or rhonchi.  Neurological: Alert and oriented x3. CN 3-7 and 9-12 intact.  Psychiatric:The patient has a normal mood and affect.       Ko Jewell PA-C, November 30, 2023

## 2024-02-23 ENCOUNTER — PATIENT OUTREACH (OUTPATIENT)
Dept: FAMILY MEDICINE | Facility: CLINIC | Age: 79
End: 2024-02-23
Payer: COMMERCIAL

## 2024-02-23 NOTE — TELEPHONE ENCOUNTER
Patient Quality Outreach    Patient is due for the following:   Colon Cancer Screening  Physical Annual Wellness Visit    Next Steps:   Schedule a Annual Wellness Visit    Type of outreach:    Sent [employer] message.      Questions for provider review:    None           Donna Caldwell, CMA

## 2024-03-20 ENCOUNTER — TELEPHONE (OUTPATIENT)
Dept: OPHTHALMOLOGY | Facility: CLINIC | Age: 79
End: 2024-03-20

## 2024-03-20 ENCOUNTER — OFFICE VISIT (OUTPATIENT)
Dept: OPHTHALMOLOGY | Facility: CLINIC | Age: 79
End: 2024-03-20
Attending: STUDENT IN AN ORGANIZED HEALTH CARE EDUCATION/TRAINING PROGRAM
Payer: COMMERCIAL

## 2024-03-20 DIAGNOSIS — H57.819 BROW PTOSIS: Primary | ICD-10-CM

## 2024-03-20 DIAGNOSIS — H02.834 DERMATOCHALASIS OF BOTH UPPER EYELIDS: ICD-10-CM

## 2024-03-20 DIAGNOSIS — H02.831 DERMATOCHALASIS OF BOTH UPPER EYELIDS: ICD-10-CM

## 2024-03-20 PROCEDURE — 99214 OFFICE O/P EST MOD 30 MIN: CPT | Performed by: OPHTHALMOLOGY

## 2024-03-20 PROCEDURE — 92081 LIMITED VISUAL FIELD XM: CPT | Performed by: OPHTHALMOLOGY

## 2024-03-20 PROCEDURE — 92285 EXTERNAL OCULAR PHOTOGRAPHY: CPT | Performed by: OPHTHALMOLOGY

## 2024-03-20 ASSESSMENT — VISUAL ACUITY
OS_SC: 20/20-
METHOD: SNELLEN - LINEAR
OD_SC: 20/20-3

## 2024-03-20 ASSESSMENT — TONOMETRY
IOP_METHOD: ICARE
OD_IOP_MMHG: 15
OS_IOP_MMHG: 13

## 2024-03-20 ASSESSMENT — EXTERNAL EXAM - LEFT EYE: OS_EXAM: BROW PTOSIS, WITH FRONTALIS RELAXED, BROW IS BELOW SUPERIOR ORBITAL RIM AND LATERALLY INLINE WITH UPPER LASHES

## 2024-03-20 ASSESSMENT — EXTERNAL EXAM - RIGHT EYE: OD_EXAM: BROW PTOSIS, WITH FRONTALIS RELAXED, BROW IS BELOW SUPERIOR ORBITAL RIM AND LATERALLY INLINE WITH UPPER LASHES

## 2024-03-20 ASSESSMENT — SLIT LAMP EXAM - LIDS
COMMENTS: HEAVY DERMATOCHALASIS RESTING ON LASHES
COMMENTS: HEAVY DERMATOCHALASIS RESTING ON LASHES

## 2024-03-20 NOTE — PROGRESS NOTES
Oculoplastic Clinic New Patient    Patient: Mami Dumont MRN# [id number]   YOB: 1945 Age: 78 year old   Date of Visit: Mar 20, 2024    CC: Droopy eyelids obstructing vision.              HPI:     Chief Complaint(s) and History of Present Illness(es)     Droopy Both Upper Lids            Laterality: right upper lid and left upper lid    Severity: moderate    Onset: chronic    Duration: 2 years    Course: gradually worsening    Associated signs and symptoms: blurred vision (blocked superior field OU)      Treatments tried: manual elevation of lid    Response to treatment: issue resolved        Comments    Mami Dumont is being seen for a consult today by the request of Dr. Ortiz for dermatochalasis/ptosis evaluation.    Mami has been bothered by her lids for over two years.  She states she   sees much better when she manually elevates her lids.  She is bothered by   the fact she can't wear mascara because her lids hang on her lashes.  If   she does wear it, it is difficult for her to apply.     Noreen Cline, COT 8:23 AM 03/20/2024        Mami Dumont is a 78 year old female who has noted gradual onset of droopy eyelids over the past years. The droopy eyelid is interfering with activities of daily living including driving, and reading. The patient denies double vision, variability of the eyelid position, or dry eye symptoms. She has been told she has dry eyes and uses drops, but doesn't feel her eyes don't feel irritated.        Prior surgery with Dr. Alicea 2017 (BULB and Browpexy)    EXAM:     MRD1: 1 mm both eyes   Dermatochalasis with excess skin touching eyelashes  Brow ptosis with brow resting below superior orbital rim     VISUAL FIELD:  Right eye untaped:10 degrees Right eye taped:50 degrees  Left eye untaped:10 degrees Left eye taped:50 degrees    Assessment & Plan     Mami Dumont is a 78 year old female with the following diagnoses:   1. Brow ptosis, ou    2.  Dermatochalasis of both upper eyelids      Both upper eyelid blepharoplasty 30718  Bilateral internal browpexy 18740    Due to significant brow ptosis, blepharoplasty alone would be unsuccesfull in addressing the lateral hooding which is obstructing vision. Blepharoplasty alone would result in sewing very thin eyelid skin to thick sub-brow skin, and even with that, fail to address lateral hooding which is obstructing vision.    We did discuss other brow lifting options. She would be very bothered by a direct brow scar she says. Not interested in other pretrichial or endoscopic options.    The heavy upper eyelids are causing symptoms as documented above. The condition is not secondary to underlying Sjogren's, myasthenia gravis, or polymyositis.   ANTICOAGULATION:  Aspirin, will need to hold for 10 days         PHOTOS DEMONSTRATE:    Significant dermatochalasis with lids resting on eyelashes and obstructing visual axis  Brow ptosis with thicker brow skin and hairs below the lateral superior orbital rim    Attending Physician Attestation: Complete documentation of historical and exam elements from today's encounter can be found in the full encounter summary report (not reduplicated in this progress note). I personally obtained the chief complaint(s) and history of present illness. I confirmed and edited as necessary the review of systems, past medical/surgical history, family history, social history, and examination findings as documented by others; and I examined the patient myself. I personally reviewed the relevant tests, images, and reports as documented above. I formulated and edited as necessary the assessment and plan and discussed the findings and management plan with the patient. Darrick Omer MD      Today with Mami Dumont I reviewed the indications, risks, benefits, and alternatives of the proposed surgical procedure including, but not limited to, failure obtain the desired result  and need for  additional surgery, bleeding, infection, loss of vision, or injury to the eye, dry eyes, and the remote possibility of permanent damage to any organ system or death with the use of anesthesia.  I provided multiple opportunities for the questions, answered all questions to the best of my ability, and confirmed that my answers and my discussion were understood.

## 2024-03-20 NOTE — LETTER
3/20/2024         RE: Mami Dumont  [address]  [place] [address]        Dear Colleague,    Thank you for referring your patient, Mami Dumont, to the [facility]. Please see a copy of my visit note below.    Oculoplastic Clinic New Patient    Patient: Mami Dumont MRN# [id number]   YOB: 1945 Age: 78 year old   Date of Visit: Mar 20, 2024    CC: Droopy eyelids obstructing vision.              HPI:     Chief Complaint(s) and History of Present Illness(es)     Droopy Both Upper Lids            Laterality: right upper lid and left upper lid    Severity: moderate    Onset: chronic    Duration: 2 years    Course: gradually worsening    Associated signs and symptoms: blurred vision (blocked superior field OU)      Treatments tried: manual elevation of lid    Response to treatment: issue resolved        Comments    Mami Dumont is being seen for a consult today by the request of Dr. Ortiz for dermatochalasis/ptosis evaluation.    Mami has been bothered by her lids for over two years.  She states she   sees much better when she manually elevates her lids.  She is bothered by   the fact she can't wear mascara because her lids hang on her lashes.  If   she does wear it, it is difficult for her to apply.     Noreen Cline, COT 8:23 AM 03/20/2024        Mami Dumont is a 78 year old female who has noted gradual onset of droopy eyelids over the past years. The droopy eyelid is interfering with activities of daily living including driving, and reading. The patient denies double vision, variability of the eyelid position, or dry eye symptoms. She has been told she has dry eyes and uses drops, but doesn't feel her eyes don't feel irritated.        Prior surgery with Dr. Alicea 2017 (BULB and Browpexy)    EXAM:     MRD1: 1 mm both eyes   Dermatochalasis with excess skin touching eyelashes  Brow ptosis with brow resting below  superior orbital rim     VISUAL FIELD:  Right eye untaped:10 degrees Right eye taped:50 degrees  Left eye untaped:10 degrees Left eye taped:50 degrees    Assessment & Plan     Mami Dumont is a 78 year old female with the following diagnoses:   1. Brow ptosis, ou    2. Dermatochalasis of both upper eyelids      Both upper eyelid blepharoplasty 14608  Bilateral internal browpexy 53479    Due to significant brow ptosis, blepharoplasty alone would be unsuccesfull in addressing the lateral hooding which is obstructing vision. Blepharoplasty alone would result in sewing very thin eyelid skin to thick sub-brow skin, and even with that, fail to address lateral hooding which is obstructing vision.    We did discuss other brow lifting options. She would be very bothered by a direct brow scar she says. Not interested in other pretrichial or endoscopic options.    The heavy upper eyelids are causing symptoms as documented above. The condition is not secondary to underlying Sjogren's, myasthenia gravis, or polymyositis.   ANTICOAGULATION:  Aspirin, will need to hold for 10 days         PHOTOS DEMONSTRATE:    Significant dermatochalasis with lids resting on eyelashes and obstructing visual axis  Brow ptosis with thicker brow skin and hairs below the lateral superior orbital rim    Attending Physician Attestation: Complete documentation of historical and exam elements from today's encounter can be found in the full encounter summary report (not reduplicated in this progress note). I personally obtained the chief complaint(s) and history of present illness. I confirmed and edited as necessary the review of systems, past medical/surgical history, family history, social history, and examination findings as documented by others; and I examined the patient myself. I personally reviewed the relevant tests, images, and reports as documented above. I formulated and edited as necessary the assessment and plan and discussed the  findings and management plan with the patient. Darrick Omer MD      Today with Mami Dumont I reviewed the indications, risks, benefits, and alternatives of the proposed surgical procedure including, but not limited to, failure obtain the desired result  and need for additional surgery, bleeding, infection, loss of vision, or injury to the eye, dry eyes, and the remote possibility of permanent damage to any organ system or death with the use of anesthesia.  I provided multiple opportunities for the questions, answered all questions to the best of my ability, and confirmed that my answers and my discussion were understood.           Again, thank you for allowing me to participate in the care of your patient.        Sincerely,        Darrick Omer MD

## 2024-03-20 NOTE — TELEPHONE ENCOUNTER
Met with the patient in clinic. I gave her a surgery packet with possible surgery dates for [place] in May and June. She is going to check her calendar and call me back to schedule.     Agnieszka Gonsalez  Surgery Scheduling Coordinator  Ph: [phone]

## 2024-03-21 NOTE — TELEPHONE ENCOUNTER
Date Scheduled: 5-13-24  Facility: Surgery Locations: [facility]  Surgeon: Dr. Omer   Post-op appointment scheduled: 5/29/24 MG    scheduled?: No  Surgery packet/instructions confirmed received?  Yes  Pre op physical/PAC appointment: Dr. Atkinson  Special Considerations:     Agnieszka Gonsalez  Surgery Scheduling Coordinator  Ph: [phone]

## 2024-04-16 DIAGNOSIS — I10 BENIGN ESSENTIAL HYPERTENSION: ICD-10-CM

## 2024-04-16 RX ORDER — AMLODIPINE BESYLATE 5 MG/1
5 TABLET ORAL DAILY
Qty: 90 TABLET | Refills: 0 | Status: SHIPPED | OUTPATIENT
Start: 2024-04-16 | End: 2024-07-25

## 2024-04-24 ENCOUNTER — TELEPHONE (OUTPATIENT)
Dept: FAMILY MEDICINE | Facility: CLINIC | Age: 79
End: 2024-04-24
Payer: COMMERCIAL

## 2024-04-24 DIAGNOSIS — E78.5 DYSLIPIDEMIA: Primary | ICD-10-CM

## 2024-04-24 NOTE — TELEPHONE ENCOUNTER
"Pt calling regarding atorvastatin.     Pt states that she has not been taking the medication regularly as prescribed.    States that she has noticed that her breathing is better, pt is able to walk well, and her \"legs do not feel as heavy.\"  No wheezing.     Says that when she was taking atorvastatin daily she would get out of breath with activity and had trouble walking.       Pt wants to know if there is another medication that can be prescribed.    States that she used to take rosuvastatin before.       Routing to provider.      TANK JamesN, RN  [facility]    "

## 2024-04-25 NOTE — TELEPHONE ENCOUNTER
We can switch to Rosuvastatin 5 mg daily   Let me know if you ok with this     Bijan Atkinson MD.

## 2024-04-25 NOTE — TELEPHONE ENCOUNTER
Provider:    Please send the rosuvastatin 5 mg to the prompted pharmacy.  Please remove atorvastatin.    Please note when you would like her to be seen or follow up for this and how. Thank you. Rosangela Renae R.N.    Patient was notified of the plan and agrees.

## 2024-04-29 NOTE — TELEPHONE ENCOUNTER
Patient is calling to check the status on this medication Rosuvastatin 5 mg   She has been out for a week now.  Please review    Thank you,  Nimo CARBAJAL    [phone]

## 2024-04-30 ENCOUNTER — TELEPHONE (OUTPATIENT)
Dept: FAMILY MEDICINE | Facility: CLINIC | Age: 79
End: 2024-04-30
Payer: COMMERCIAL

## 2024-04-30 RX ORDER — ROSUVASTATIN CALCIUM 5 MG/1
5 TABLET, COATED ORAL DAILY
Qty: 90 TABLET | Refills: 1 | Status: SHIPPED | OUTPATIENT
Start: 2024-04-30

## 2024-04-30 NOTE — TELEPHONE ENCOUNTER
"Patient calling to get rosuvastatin (CRESTOR) 5 MG tablet sent to pharmacy. This is a switch from atorvastatin to crestor. Patient upset, and reports \"I will be taking my business elsewhere if this continues.\"    Huddled with provider, and provider has sent prescription.    Called and left message to call clinic back to inform patient of medication change and sent/received by pharmacy.    Ynes Ring RN    "

## 2024-05-09 ENCOUNTER — OFFICE VISIT (OUTPATIENT)
Dept: FAMILY MEDICINE | Facility: CLINIC | Age: 79
End: 2024-05-09
Payer: COMMERCIAL

## 2024-05-09 ENCOUNTER — ANESTHESIA EVENT (OUTPATIENT)
Dept: SURGERY | Facility: AMBULATORY SURGERY CENTER | Age: 79
End: 2024-05-09
Payer: COMMERCIAL

## 2024-05-09 VITALS
TEMPERATURE: 97.2 F | SYSTOLIC BLOOD PRESSURE: 142 MMHG | DIASTOLIC BLOOD PRESSURE: 76 MMHG | HEIGHT: 60 IN | RESPIRATION RATE: 16 BRPM | OXYGEN SATURATION: 99 % | HEART RATE: 73 BPM | WEIGHT: 113 LBS | BODY MASS INDEX: 22.19 KG/M2

## 2024-05-09 DIAGNOSIS — Z01.818 PREOP GENERAL PHYSICAL EXAM: Primary | ICD-10-CM

## 2024-05-09 DIAGNOSIS — H02.831 DERMATOCHALASIS OF BOTH UPPER EYELIDS: ICD-10-CM

## 2024-05-09 DIAGNOSIS — H57.819 BROW PTOSIS: ICD-10-CM

## 2024-05-09 DIAGNOSIS — H02.834 DERMATOCHALASIS OF BOTH UPPER EYELIDS: ICD-10-CM

## 2024-05-09 PROCEDURE — 99214 OFFICE O/P EST MOD 30 MIN: CPT | Performed by: FAMILY MEDICINE

## 2024-05-09 PROCEDURE — 93000 ELECTROCARDIOGRAM COMPLETE: CPT | Performed by: FAMILY MEDICINE

## 2024-05-09 ASSESSMENT — PAIN SCALES - GENERAL: PAINLEVEL: NO PAIN (0)

## 2024-05-09 NOTE — ANESTHESIA PREPROCEDURE EVALUATION
Anesthesia Pre-Procedure Evaluation    Patient: Mami Dumont   MRN: [id number] : 1945        Procedure : Procedure(s):  Both upper eyelid blepharoplasty and internal browpexy          Past Medical History:   Diagnosis Date    Allergic rhinitis due to animal dander  skin tests: pos. for cat/DM/M/RW per Dr. Shah.          Basal cell carcinoma     Coronary artery disease due to calcified coronary lesion 2017    Diagnostic skin and sensitization tests      skin tests: pos. for cat/DM/M/RW per Dr. Shah.     House dust mite allergy     Hypertension goal BP (blood pressure) < 140/90 09/10/2013    Open angle with borderline findings, low risk 2013    Rhinitis, allergic to other allergen     Seasonal allergic conjunctivitis     Seasonal allergic rhinitis     hx of elevated BP on Sudafed, Pt told to discontinue Sudafed products by Dr. Taylor on 3/1/10 visit.     Tubular adenoma of colon 2013    colonoscopy due 2018      Past Surgical History:   Procedure Laterality Date    BLEPHAROPLASTY, BROW LIFT BILATERAL, COMBINED  2013    with snip punctoplasty,[place]    BLEPHAROPLASTY, BROW LIFT BILATERAL, COMBINED Bilateral 2017    Procedure: COMBINED BLEPHAROPLASTY, BROW LIFT BILATERAL;  BILATERAL UPPER LID BLEPHAROPLASTY WITH BROW PTOSIS ;  Surgeon: Faisal Alicea MD;  Location:  [facility]    CATARACT IOL, RT/LT      ENDOSCOPIC POLYPECTOMY NASAL  1960    nasal polyp removal    PHACOEMULSIFICATION CLEAR CORNEA WITH STANDARD INTRAOCULAR LENS IMPLANT Right 2023    Procedure: RIGHT PHACOEMULSIFICATION, CATARACT, WITH INTRAOCULAR LENS IMPLANT;  Surgeon: Kyle Ortiz MD;  Location:  OR    PHACOEMULSIFICATION WITH STANDARD INTRAOCULAR LENS IMPLANT Left 2020    REPAIR PTOSIS        Allergies   Allergen Reactions    Penicillins Shortness Of Breath and Hives    Ace Inhibitors      Head tremor    Erythromycin      Other reaction(s): Stomach Upset      Social History  "    Tobacco Use    Smoking status: Never     Passive exposure: Never    Smokeless tobacco: Never    Tobacco comments:     Lives in smoke free household   Substance Use Topics    Alcohol use: Not Currently     Comment: occassional      Wt Readings from Last 1 Encounters:   11/30/23 50.7 kg (111 lb 11.2 oz)           Physical Exam    Airway        Mallampati: II   TM distance: > 3 FB   Neck ROM: full   Mouth opening: > 3 cm    Respiratory Devices and Support         Dental       (+) Modest Abnormalities - crowns, retainers, 1 or 2 missing teeth      Cardiovascular   cardiovascular exam normal          Pulmonary   pulmonary exam normal                OUTSIDE LABS:  CBC:   Lab Results   Component Value Date    WBC 10.0 12/15/2020    WBC 7.5 11/21/2013    HGB 13.3 12/15/2020    HGB 13.5 05/05/2017    HCT 39.6 12/15/2020    HCT 39.0 11/21/2013     12/15/2020     11/21/2013     BMP:   Lab Results   Component Value Date     04/14/2022     11/08/2019    POTASSIUM 4.2 04/14/2022    POTASSIUM 4.4 12/15/2020    CHLORIDE 101 04/14/2022    CHLORIDE 105 11/08/2019    CO2 26 04/14/2022    CO2 30 11/08/2019    BUN 10 04/14/2022    BUN 12 11/08/2019    CR 0.89 04/14/2022    CR 0.80 12/15/2020    GLC 96 04/14/2022    GLC 96 11/08/2019     COAGS: No results found for: \"PTT\", \"INR\", \"FIBR\"  POC: No results found for: \"BGM\", \"HCG\", \"HCGS\"  HEPATIC:   Lab Results   Component Value Date    ALBUMIN 3.9 10/07/2015    PROTTOTAL 6.8 10/07/2015    ALT 29 10/07/2015    AST 21 10/07/2015    ALKPHOS 67 10/07/2015    BILITOTAL 0.4 10/07/2015     OTHER:   Lab Results   Component Value Date    KOMAL 9.2 04/14/2022    PHOS 3.9 11/21/2013    TSH 1.35 11/21/2013       Anesthesia Plan    ASA Status:  2    NPO Status:  NPO Appropriate    Anesthesia Type: MAC.     - Reason for MAC: straight local not clinically adequate   Induction: Intravenous, Propofol.   Maintenance: TIVA.        Consents    Anesthesia Plan(s) and associated " risks, benefits, and realistic alternatives discussed. Questions answered and patient/representative(s) expressed understanding.     - Discussed: Risks, Benefits and Alternatives for BOTH SEDATION and the PROCEDURE were discussed     - Discussed with:  Patient      - Extended Intubation/Ventilatory Support Discussed: No.      - Patient is DNR/DNI Status: No     Use of blood products discussed: No .     Postoperative Care    Pain management: IV analgesics, Oral pain medications, Multi-modal analgesia.   PONV prophylaxis: Dexamethasone or Solumedrol, Ondansetron (or other 5HT-3), Background Propofol Infusion     Comments:               Murray Bui MD    I have reviewed the pertinent notes and labs in the chart from the past 30 days and (re)examined the patient.  Any updates or changes from those notes are reflected in this note.

## 2024-05-09 NOTE — PATIENT INSTRUCTIONS
Preparing for Your Surgery  Getting started  A nurse will call you to review your health history and instructions. They will give you an arrival time based on your scheduled surgery time. Please be ready to share:  Your doctor's clinic name and phone number  Your medical, surgical, and anesthesia history  A list of allergies and sensitivities  A list of medicines, including herbal treatments and over-the-counter drugs  Whether the patient has a legal guardian (ask how to send us the papers in advance)  Please tell us if you're pregnant--or if there's any chance you might be pregnant. Some surgeries may injure a fetus (unborn baby), so they require a pregnancy test. Surgeries that are safe for a fetus don't always need a test, and you can choose whether to have one.   If you have a child who's having surgery, please ask for a copy of Preparing for Your Child's Surgery.    Preparing for surgery  Within 10 to 30 days of surgery: Have a pre-op exam (sometimes called an H&P, or History and Physical). This can be done at a clinic or pre-operative center.  If you're having a , you may not need this exam. Talk to your care team.  At your pre-op exam, talk to your care team about all medicines you take. If you need to stop any medicines before surgery, ask when to start taking them again.  We do this for your safety. Many medicines can make you bleed too much during surgery. Some change how well surgery (anesthesia) drugs work.  Call your insurance company to let them know you're having surgery. (If you don't have insurance, call [phone].)  Call your clinic if there's any change in your health. This includes signs of a cold or flu (sore throat, runny nose, cough, rash, fever). It also includes a scrape or scratch near the surgery site.  If you have questions on the day of surgery, call your hospital or surgery center.  Eating and drinking guidelines  For your safety: Unless your surgeon tells you otherwise,  follow the guidelines below.  Eat and drink as usual until 8 hours before you arrive for surgery. After that, no food or milk.  Drink clear liquids until 2 hours before you arrive. These are liquids you can see through, like water, Gatorade, and Propel Water. They also include plain black coffee and tea (no cream or milk), candy, and breath mints. You can spit out gum when you arrive.  If you drink alcohol: Stop drinking it the night before surgery.  If your care team tells you to take medicine on the morning of surgery, it's okay to take it with a sip of water.  Preventing infection  Shower or bathe the night before and morning of your surgery. Follow the instructions your clinic gave you. (If no instructions, use regular soap.)  Don't shave or clip hair near your surgery site. We'll remove the hair if needed.  Don't smoke or vape the morning of surgery. You may chew nicotine gum up to 2 hours before surgery. A nicotine patch is okay.  Note: Some surgeries require you to completely quit smoking and nicotine. Check with your surgeon.  Your care team will make every effort to keep you safe from infection. We will:  Clean our hands often with soap and water (or an alcohol-based hand rub).  Clean the skin at your surgery site with a special soap that kills germs.  Give you a special gown to keep you warm. (Cold raises the risk of infection.)  Wear special hair covers, masks, gowns and gloves during surgery.  Give antibiotic medicine, if prescribed. Not all surgeries need antibiotics.  What to bring on the day of surgery  Photo ID and insurance card  Copy of your health care directive, if you have one  Glasses and hearing aids (bring cases)  You can't wear contacts during surgery  Inhaler and eye drops, if you use them (tell us about these when you arrive)  CPAP machine or breathing device, if you use them  A few personal items, if spending the night  If you have . . .  A pacemaker, ICD (cardiac defibrillator) or other  implant: Bring the ID card.  An implanted stimulator: Bring the remote control.  A legal guardian: Bring a copy of the certified (court-stamped) guardianship papers.  Please remove any jewelry, including body piercings. Leave jewelry and other valuables at home.  If you're going home the day of surgery  You must have a responsible adult drive you home. They should stay with you overnight as well.  If you don't have someone to stay with you, and you aren't safe to go home alone, we may keep you overnight. Insurance often won't pay for this.  After surgery  If it's hard to control your pain or you need more pain medicine, please call your surgeon's office.  Questions?   If you have any questions for your care team, list them here: _________________________________________________________________________________________________________________________________________________________________________ ____________________________________ ____________________________________ ____________________________________  For informational purposes only. Not to replace the advice of your health care provider. Copyright   2003, 2019 [facility]. All rights reserved. Clinically reviewed by Sharon Paniagua MD. [employer] 058147 - REV 12/22.    How to Take Your Medication Before Surgery  - Take all of your medications before surgery as usual

## 2024-05-09 NOTE — PROGRESS NOTES
Preoperative Evaluation  [facility]  [address] [facility] [address]  Phone: [phone]  Primary Provider: Mabel Atkinson  Pre-op Performing Provider: MABEL ATKINSON  May 9, 2024       Mireya is a 79 year old, presenting for the following:  Pre-Op Exam        5/9/2024     3:04 PM   Additional Questions   Roomed by Donna LARSEN   Accompanied by Self     Surgical Information  Surgery/Procedure: Both upper eyelid blepharoplasty and internal browpexy   Surgery Location: [place]    Surgeon: Darrick Omer MD   Surgery Date: 5/13/24  Time of Surgery: TBD  Where patient plans to recover: At home with family  Fax number for surgical facility: Note does not need to be faxed, will be available electronically in Epic.    Assessment & Plan     The proposed surgical procedure is considered LOW risk.    Preop general physical exam  There is no contraindication for the procedure .    - EKG 12-lead complete w/read - Clinics    Brow ptosis, ou      Dermatochalasis of both upper eyelids     - No identified additional risk factors other than previously addressed    Antiplatelet or Anticoagulation Medication Instructions   - Bleeding risk is low for this procedure (e.g. dental, skin, cataract).    Additional Medication Instructions  Patient is to take all scheduled medications on the day of surgery    Recommendation  APPROVAL GIVEN to proceed with proposed procedure, without further diagnostic evaluation.          Subjective       HPI related to upcoming procedure:     Mami Dumont is a 79 year old female who presents today for : Both upper eyelid blepharoplasty and internal browpexy   Patient denies any chest pain, dyspnea at rest or with exertion, PND, orthopnea, peripheral edema, palpitations, lightheadedness or syncope.          5/9/2024     3:05 PM   Preop Questions   1. Have you ever had a heart attack or stroke? YES -    2. Have you ever had surgery on your heart or blood vessels,  such as a stent placement, a coronary artery bypass, or surgery on an artery in your head, neck, heart, or legs? No   3. Do you have chest pain with activity? No   4. Do you have a history of  heart failure? No   5. Do you currently have a cold, bronchitis or symptoms of other infection? No   6. Do you have a cough, shortness of breath, or wheezing? No   7. Do you or anyone in your family have previous history of blood clots? YES -    8. Do you or does anyone in your family have a serious bleeding problem such as prolonged bleeding following surgeries or cuts? No   9. Have you ever had problems with anemia or been told to take iron pills? YES -    10. Have you had any abnormal blood loss such as black, tarry or bloody stools, or abnormal vaginal bleeding? No   11. Have you ever had a blood transfusion? No   12. Are you willing to have a blood transfusion if it is medically needed before, during, or after your surgery? Yes   13. Have you or any of your relatives ever had problems with anesthesia? No   14. Do you have sleep apnea, excessive snoring or daytime drowsiness? No   15. Do you have any artifical heart valves or other implanted medical devices like a pacemaker, defibrillator, or continuous glucose monitor? No   16. Do you have artificial joints? No   17. Are you allergic to latex? No     Health Care Directive  Patient does not have a Health Care Directive or Living Will: Discussed advance care planning with patient; however, patient declined at this time.    Preoperative Review of    reviewed - no record of controlled substances prescribed.      Status of Chronic Conditions:  See problem list for active medical problems.  Problems all longstanding and stable, except as noted/documented.  See ROS for pertinent symptoms related to these conditions.    Patient Active Problem List    Diagnosis Date Noted    Combined form of age-related cataract, right eye 06/27/2023     Priority: Medium    Pseudophakia, os  12/28/2020     Priority: Medium    Combined forms of age-related cataract, mild-mod, of right eye 12/05/2020     Priority: Medium    Bilateral low back pain with left-sided sciatica 07/21/2020     Priority: Medium    Internal hemorrhoids 10/16/2018     Priority: Medium    Coronary artery disease due to calcified coronary lesion 07/09/2017     Priority: Medium    Compression fracture of L1 lumbar vertebra, closed, initial encounter (H) 07/09/2017     Priority: Medium    Family history of premature CAD 07/09/2017     Priority: Medium    Glaucoma suspect of both eyes 03/14/2017     Priority: Medium    Essential tremor 01/23/2014     Priority: Medium    Hypertension goal BP (blood pressure) < 140/90 09/10/2013     Priority: Medium    Tubular adenoma of colon      Priority: Medium    Dermatochalasis, ou 05/23/2013     Priority: Medium    Brow ptosis, ou 05/23/2013     Priority: Medium    Hyperlipidemia LDL goal <160 04/24/2013     Priority: Medium    Heme positive stool 04/24/2013     Priority: Medium    CARDIOVASCULAR SCREENING; LDL GOAL LESS THAN 160 04/05/2013     Priority: Medium    Advanced directives, counseling/discussion 04/03/2013     Priority: Medium     Advance Care Planning:   Form given Venecia Lu MA              Diagnostic skin and sensitization tests      Priority: Medium     1/99 skin tests: pos. for cat/DM/M/RW per Dr. Shah.       Allergic rhinitis due to animal dander      Priority: Medium    Seasonal allergic rhinitis      Priority: Medium    House dust mite allergy      Priority: Medium    Rhinitis, allergic to other allergen      Priority: Medium     IMO update changed this record. Please review for accuracy      Seasonal allergic conjunctivitis      Priority: Medium      Past Medical History:   Diagnosis Date    Allergic rhinitis due to animal dander 1/99 skin tests: pos. for cat/DM/M/RW per Dr. Shah.          Basal cell carcinoma     Coronary artery disease due to calcified coronary lesion  07/09/2017    Diagnostic skin and sensitization tests     1/99 skin tests: pos. for cat/DM/M/RW per Dr. Shah.     House dust mite allergy     Hypertension goal BP (blood pressure) < 140/90 09/10/2013    Open angle with borderline findings, low risk 08/29/2013    Rhinitis, allergic to other allergen     Seasonal allergic conjunctivitis     Seasonal allergic rhinitis     hx of elevated BP on Sudafed, Pt told to discontinue Sudafed products by Dr. Taylor on 3/1/10 visit.     Tubular adenoma of colon 05/2013    colonoscopy due 5/2018     Past Surgical History:   Procedure Laterality Date    BLEPHAROPLASTY, BROW LIFT BILATERAL, COMBINED  05/02/2013    with snip punctoplasty,[place]    BLEPHAROPLASTY, BROW LIFT BILATERAL, COMBINED Bilateral 05/19/2017    Procedure: COMBINED BLEPHAROPLASTY, BROW LIFT BILATERAL;  BILATERAL UPPER LID BLEPHAROPLASTY WITH BROW PTOSIS ;  Surgeon: Faisal Alicea MD;  Location: [facility]    CATARACT IOL, RT/LT      ENDOSCOPIC POLYPECTOMY NASAL  1960    nasal polyp removal    PHACOEMULSIFICATION CLEAR CORNEA WITH STANDARD INTRAOCULAR LENS IMPLANT Right 08/14/2023    Procedure: RIGHT PHACOEMULSIFICATION, CATARACT, WITH INTRAOCULAR LENS IMPLANT;  Surgeon: Kyle Ortiz MD;  Location: [facility]    PHACOEMULSIFICATION WITH STANDARD INTRAOCULAR LENS IMPLANT Left 12/28/2020    REPAIR PTOSIS       Current Outpatient Medications   Medication Sig Dispense Refill    acetaminophen (TYLENOL) 500 MG tablet Take 500-1,000 mg by mouth every 6 hours as needed for mild pain      amLODIPine (NORVASC) 5 MG tablet Take 1 tablet (5 mg) by mouth daily 90 tablet 0    aspirin (ASA) 81 MG chewable tablet Take 81 mg by mouth      atorvastatin (LIPITOR) 20 MG tablet Take 1 tablet (20 mg) by mouth at bedtime 90 tablet 0    azelastine (ASTELIN) 0.1 % nasal spray Spray 1 spray into both nostrils 2 times daily 30 mL 0    azelastine (ASTELIN) 0.1 % nasal spray Spray 1 spray into both nostrils 2 times daily (Patient taking  differently: Spray 1 spray into both nostrils 2 times daily As needed) 30 mL 1    FLONASE 50 MCG/ACT nasal spray Spray 2 sprays in nostril daily Brand necessary. Patient gets headache with generic. 16 g 3    loratadine-pseudoePHEDrine (CLARITIN-D 12-HOUR) 5-120 MG 12 hr tablet Take 1 tablet by mouth as needed Taking less than prior due to hypertension.      loratadine-pseudoePHEDrine (CLARITIN-D 24-HOUR)  MG 24 hr tablet Take 1 tablet by mouth as needed for allergies      methocarbamol (ROBAXIN) 500 MG tablet Take 1 tablet (500 mg) by mouth 4 times daily as needed for muscle spasms 30 tablet 0    Multiple Vitamins-Minerals (CENTRUM PO) Take  by mouth. Takes 1 daily      naphazoline-pheniramine (NAPHCON A) SOLN ophthalmic solution Place 1-2 drops into both eyes as needed      pantoprazole (PROTONIX) 40 MG EC tablet       propranolol (INDERAL) 20 MG tablet Take 20 mg by mouth      rosuvastatin (CRESTOR) 5 MG tablet Take 1 tablet (5 mg) by mouth daily 90 tablet 1    tretinoin (RETIN-A) 0.025 % external cream Apply a pea size to entire face QD 45 g 4    VITAMIN E-200 PO Take  by mouth. Takes 1 daily         Allergies   Allergen Reactions    Penicillins Shortness Of Breath and Hives    Ace Inhibitors      Head tremor    Erythromycin      Other reaction(s): Stomach Upset        Social History     Tobacco Use    Smoking status: Never     Passive exposure: Never    Smokeless tobacco: Never    Tobacco comments:     Lives in smoke free household   Substance Use Topics    Alcohol use: Not Currently     Comment: occassional     Family History   Problem Relation Age of Onset    Cancer Mother     Diabetes Mother     Hypertension Mother     Cerebrovascular Disease Mother     Glaucoma Mother 70        one eye-sudden  loss of vision upon waking    Hypertension Brother     Hypertension Brother     Glaucoma Brother     Thyroid Disease No family hx of     Macular Degeneration No family hx of      History   Drug Use No        "  Review of Systems    Review of Systems  Constitutional, HEENT, cardiovascular, pulmonary, gi and gu systems are negative, except as otherwise noted.    Objective    BP (!) 155/79   Pulse 73   Temp 97.2  F (36.2  C) (Tympanic)   Resp 16   Ht 1.524 m (5')   Wt 51.3 kg (113 lb)   SpO2 99%   BMI 22.07 kg/m     Estimated body mass index is 22.07 kg/m  as calculated from the following:    Height as of this encounter: 1.524 m (5').    Weight as of this encounter: 51.3 kg (113 lb).  Physical Exam  GENERAL: alert and no distress  NECK: no adenopathy, no asymmetry, masses, or scars  RESP: lungs clear to auscultation - no rales, rhonchi or wheezes  CV: regular rate and rhythm, normal S1 S2, no S3 or S4, no murmur, click or rub, no peripheral edema  ABDOMEN: soft, nontender, no hepatosplenomegaly, no masses and bowel sounds normal  MS: no gross musculoskeletal defects noted, no edema    No results for input(s): \"HGB\", \"PLT\", \"INR\", \"NA\", \"POTASSIUM\", \"CR\", \"A1C\" in the last 16869 hours.     Diagnostics  No labs were ordered during this visit.   EKG: appears normal, NSR, normal axis, normal intervals, no acute ST/T changes c/w ischemia, no LVH by voltage criteria, unchanged from previous tracings    Revised Cardiac Risk Index (RCRI)  The patient has the following serious cardiovascular risks for perioperative complications:   - No serious cardiac risks = 0 points     RCRI Interpretation: 0 points: Class I (very low risk - 0.4% complication rate)         Signed Electronically by: Bijan Atkinson MD  Copy of this evaluation report is provided to requesting physician.         "

## 2024-05-13 ENCOUNTER — TELEPHONE (OUTPATIENT)
Dept: OPHTHALMOLOGY | Facility: CLINIC | Age: 79
End: 2024-05-13

## 2024-05-13 ENCOUNTER — ANESTHESIA (OUTPATIENT)
Dept: SURGERY | Facility: AMBULATORY SURGERY CENTER | Age: 79
End: 2024-05-13
Payer: COMMERCIAL

## 2024-05-13 ENCOUNTER — HOSPITAL ENCOUNTER (OUTPATIENT)
Facility: AMBULATORY SURGERY CENTER | Age: 79
Discharge: HOME | End: 2024-05-13
Attending: OPHTHALMOLOGY | Admitting: OPHTHALMOLOGY
Payer: COMMERCIAL

## 2024-05-13 VITALS
RESPIRATION RATE: 16 BRPM | OXYGEN SATURATION: 96 % | BODY MASS INDEX: 22.07 KG/M2 | HEART RATE: 72 BPM | TEMPERATURE: 97.1 F | SYSTOLIC BLOOD PRESSURE: 164 MMHG | WEIGHT: 113 LBS | DIASTOLIC BLOOD PRESSURE: 59 MMHG

## 2024-05-13 DIAGNOSIS — Z98.890 POSTOPERATIVE EYE STATE: Primary | ICD-10-CM

## 2024-05-13 PROCEDURE — 67900 REPAIR BROW DEFECT: CPT | Mod: 50

## 2024-05-13 PROCEDURE — 67900 REPAIR BROW DEFECT: CPT | Mod: 50 | Performed by: OPHTHALMOLOGY

## 2024-05-13 PROCEDURE — G8918 PT W/O PREOP ORDER IV AB PRO: HCPCS

## 2024-05-13 PROCEDURE — 15820 BLEPHAROPLASTY LOWER EYELID: CPT | Performed by: STUDENT IN AN ORGANIZED HEALTH CARE EDUCATION/TRAINING PROGRAM

## 2024-05-13 PROCEDURE — 99100 ANES PT EXTEME AGE<1 YR&>70: CPT | Performed by: NURSE ANESTHETIST, CERTIFIED REGISTERED

## 2024-05-13 PROCEDURE — 15823 BLEPHARP UPR EYELID XCSV SKN: CPT | Mod: 50

## 2024-05-13 PROCEDURE — 15823 BLEPHARP UPR EYELID XCSV SKN: CPT | Mod: 50 | Performed by: OPHTHALMOLOGY

## 2024-05-13 PROCEDURE — 99100 ANES PT EXTEME AGE<1 YR&>70: CPT | Performed by: STUDENT IN AN ORGANIZED HEALTH CARE EDUCATION/TRAINING PROGRAM

## 2024-05-13 PROCEDURE — G8907 PT DOC NO EVENTS ON DISCHARG: HCPCS

## 2024-05-13 PROCEDURE — 15820 BLEPHAROPLASTY LOWER EYELID: CPT | Performed by: NURSE ANESTHETIST, CERTIFIED REGISTERED

## 2024-05-13 RX ORDER — OXYCODONE HYDROCHLORIDE 5 MG/1
10 TABLET ORAL
Status: DISCONTINUED | OUTPATIENT
Start: 2024-05-13 | End: 2024-05-14 | Stop reason: HOSPADM

## 2024-05-13 RX ORDER — ONDANSETRON 2 MG/ML
4 INJECTION INTRAMUSCULAR; INTRAVENOUS EVERY 30 MIN PRN
Status: DISCONTINUED | OUTPATIENT
Start: 2024-05-13 | End: 2024-05-14 | Stop reason: HOSPADM

## 2024-05-13 RX ORDER — ONDANSETRON 4 MG/1
4 TABLET, ORALLY DISINTEGRATING ORAL EVERY 30 MIN PRN
Status: DISCONTINUED | OUTPATIENT
Start: 2024-05-13 | End: 2024-05-14 | Stop reason: HOSPADM

## 2024-05-13 RX ORDER — LIDOCAINE 40 MG/G
CREAM TOPICAL
Status: DISCONTINUED | OUTPATIENT
Start: 2024-05-13 | End: 2024-05-14 | Stop reason: HOSPADM

## 2024-05-13 RX ORDER — NALOXONE HYDROCHLORIDE 0.4 MG/ML
0.1 INJECTION, SOLUTION INTRAMUSCULAR; INTRAVENOUS; SUBCUTANEOUS
Status: DISCONTINUED | OUTPATIENT
Start: 2024-05-13 | End: 2024-05-14 | Stop reason: HOSPADM

## 2024-05-13 RX ORDER — ACETAMINOPHEN 325 MG/1
975 TABLET ORAL ONCE
Status: DISCONTINUED | OUTPATIENT
Start: 2024-05-13 | End: 2024-05-14 | Stop reason: HOSPADM

## 2024-05-13 RX ORDER — SODIUM CHLORIDE, SODIUM LACTATE, POTASSIUM CHLORIDE, CALCIUM CHLORIDE 600; 310; 30; 20 MG/100ML; MG/100ML; MG/100ML; MG/100ML
INJECTION, SOLUTION INTRAVENOUS CONTINUOUS
Status: DISCONTINUED | OUTPATIENT
Start: 2024-05-13 | End: 2024-05-14 | Stop reason: HOSPADM

## 2024-05-13 RX ORDER — ERYTHROMYCIN 5 MG/G
OINTMENT OPHTHALMIC PRN
Status: DISCONTINUED | OUTPATIENT
Start: 2024-05-13 | End: 2024-05-13 | Stop reason: HOSPADM

## 2024-05-13 RX ORDER — PROPOFOL 10 MG/ML
INJECTION, EMULSION INTRAVENOUS PRN
Status: DISCONTINUED | OUTPATIENT
Start: 2024-05-13 | End: 2024-05-13

## 2024-05-13 RX ORDER — ERYTHROMYCIN 5 MG/G
OINTMENT OPHTHALMIC
Qty: 3.5 G | Refills: 0 | Status: SHIPPED | OUTPATIENT
Start: 2024-05-13 | End: 2024-05-29

## 2024-05-13 RX ORDER — FENTANYL CITRATE 50 UG/ML
25 INJECTION, SOLUTION INTRAMUSCULAR; INTRAVENOUS EVERY 5 MIN PRN
Status: DISCONTINUED | OUTPATIENT
Start: 2024-05-13 | End: 2024-05-14 | Stop reason: HOSPADM

## 2024-05-13 RX ORDER — FENTANYL CITRATE 50 UG/ML
INJECTION, SOLUTION INTRAMUSCULAR; INTRAVENOUS PRN
Status: DISCONTINUED | OUTPATIENT
Start: 2024-05-13 | End: 2024-05-13

## 2024-05-13 RX ORDER — OXYCODONE HYDROCHLORIDE 5 MG/1
5 TABLET ORAL
Status: DISCONTINUED | OUTPATIENT
Start: 2024-05-13 | End: 2024-05-14 | Stop reason: HOSPADM

## 2024-05-13 RX ORDER — DEXAMETHASONE SODIUM PHOSPHATE 4 MG/ML
4 INJECTION, SOLUTION INTRA-ARTICULAR; INTRALESIONAL; INTRAMUSCULAR; INTRAVENOUS; SOFT TISSUE
Status: DISCONTINUED | OUTPATIENT
Start: 2024-05-13 | End: 2024-05-14 | Stop reason: HOSPADM

## 2024-05-13 RX ORDER — LIDOCAINE HYDROCHLORIDE 20 MG/ML
INJECTION, SOLUTION INFILTRATION; PERINEURAL PRN
Status: DISCONTINUED | OUTPATIENT
Start: 2024-05-13 | End: 2024-05-13

## 2024-05-13 RX ORDER — TETRACAINE HYDROCHLORIDE 5 MG/ML
SOLUTION OPHTHALMIC PRN
Status: DISCONTINUED | OUTPATIENT
Start: 2024-05-13 | End: 2024-05-13 | Stop reason: HOSPADM

## 2024-05-13 RX ORDER — FENTANYL CITRATE 50 UG/ML
50 INJECTION, SOLUTION INTRAMUSCULAR; INTRAVENOUS EVERY 5 MIN PRN
Status: DISCONTINUED | OUTPATIENT
Start: 2024-05-13 | End: 2024-05-14 | Stop reason: HOSPADM

## 2024-05-13 RX ADMIN — SODIUM CHLORIDE, SODIUM LACTATE, POTASSIUM CHLORIDE, CALCIUM CHLORIDE: 600; 310; 30; 20 INJECTION, SOLUTION INTRAVENOUS at 11:18

## 2024-05-13 RX ADMIN — PROPOFOL 100 MG: 10 INJECTION, EMULSION INTRAVENOUS at 11:27

## 2024-05-13 RX ADMIN — FENTANYL CITRATE 25 MCG: 50 INJECTION, SOLUTION INTRAMUSCULAR; INTRAVENOUS at 11:24

## 2024-05-13 RX ADMIN — LIDOCAINE HYDROCHLORIDE 60 MG: 20 INJECTION, SOLUTION INFILTRATION; PERINEURAL at 11:26

## 2024-05-13 NOTE — TELEPHONE ENCOUNTER
[facility] Call Center    Phone Message    May a detailed message be left on voicemail: yes     Reason for Call: Other: Patient was started on an ointment today postop, and she has questions.  Please call.   Thank you.      Action Taken: Message routed to:  [facility] ([facility] Ophthalmology    Travel Screening: Not Applicable

## 2024-05-13 NOTE — ANESTHESIA POSTPROCEDURE EVALUATION
Patient: Mami Dumont    Procedure: Procedure(s):  Both upper eyelid blepharoplasty and internal browpexy       Anesthesia Type:  MAC    Note:  Disposition: Outpatient   Postop Pain Control: Uneventful            Sign Out: Well controlled pain   PONV: No   Neuro/Psych: Uneventful            Sign Out: Acceptable/Baseline neuro status   Airway/Respiratory: Uneventful            Sign Out: Acceptable/Baseline resp. status   CV/Hemodynamics: Uneventful            Sign Out: Acceptable CV status; No obvious hypovolemia; No obvious fluid overload   Other NRE:    DID A NON-ROUTINE EVENT OCCUR?            Last vitals:  Vitals Value Taken Time   /67 05/13/24 1154   Temp     Pulse 63 05/13/24 1154   Resp 16 05/13/24 1154   SpO2 99 % 05/13/24 1154       Electronically Signed By: Murray Bui MD  May 13, 2024  12:29 PM

## 2024-05-13 NOTE — BRIEF OP NOTE
[facility]    Brief Operative Note    Pre-operative diagnosis: Dermatochalasis of both upper eyelids [H02.831, H02.834]  Brow ptosis [H57.819]  Post-operative diagnosis Same as pre-operative diagnosis    Procedure: Both upper eyelid blepharoplasty and internal browpexy, Bilateral - Eye    Surgeon: Surgeons and Role:     * Darrick Omer MD - Primary  Anesthesia: MAC with Local   Estimated Blood Loss: None    Drains: None  Specimens: * No specimens in log *  Findings:   None.  Complications: None  Implants: * No implants in log *    Cem Humphries MD  Resident Physician, PGY-2  Department of Ophthalmology

## 2024-05-13 NOTE — TELEPHONE ENCOUNTER
Spoke with pt, she had questions on whether she should use ointment on incision sites today or wait until tomorrow. Discussed she could a dose yet tonight on the incision sites and then in the eye tonight. Tomorrow 3 times daily on incision sites and once at bedtime in the eye. Pt voiced understanding.     Lolis Quiles, COT, 3:40 PM 05/13/2024

## 2024-05-13 NOTE — DISCHARGE INSTRUCTIONS
Post-operative Instructions  Ophthalmic Plastic and Reconstructive Surgery    Darrick Omer M.D.     All instructions apply to the operated eye(s) or eyelid(s).    Wound care and personal care  ? Apply ice compresses and gentle pressure 15 minutes on, 15 minutes off, for 2 days. If you are sleeping, you don't need to wake up to ice. As long as there is no further bleeding, after two days, switch to warm water compresses for five minutes, four times a day until seen by your physician.   ? You may shower or wash your hair the day after surgery. Do not go swimming for at least 2 weeks to prevent contamination of your wounds.  ? You may go for walks and light activity is ok, but no heavy (over 15 pounds) lifting, bending or excessive straining for one week.   ? Do not apply make-up to the eyes or eyelids for 2 weeks after surgery.  ? Expect some swelling, bruising, black eye (even into the lower eyelids and cheeks). Also expect serum caking, crusting and discharge from the eye and/or incisions. A small amount of surface bleeding, and depending on the type of surgery, bleeding from the inside of the eyelid, is normal for the first 48 hours.  ? Avoid straining, bending at the waist, or lifting more than 15 pounds for 1 week. Sleeping with your head elevated, such as in a recliner, for the first several days can help swelling resolve more quickly.   ? Do continue to ambulate (walk) as you normally would - being sedentary after surgery can cause blood clots.   ? Your eye(s) and eyelid(s) may be painful and tender. This is normal after surgery.      Contact information and follow-up  ? Return to the Eye Clinic for a follow-up appointment with your physician as scheduled. If no appointment has been scheduled:   - [facility] eye clinic: [phone] for an appointment with Dr. Omer within 2 to 3 weeks from your date of surgery.    After hours or on weekends and holidays, call [phone] and ask to  speak with the ophthalmologist on call.    An on call person can be reached after hours for concerns. The on call doctor should not call in medication refill requests after hours or on weekends, so please plan accordingly. An effort has been made to provide adequate pain medications following every surgery, and refills will not be provided in most instances.     Medications  ? Restart all regular home medications and eye drops. If you take Plavix or Aspirin on a regular basis, wait for 72 hours after your surgery before restarting these in order to decrease the risk of bleeding complications.  ? Avoid aspirin and aspirin-like medications (Motrin, Aleve, Ibuprofen, [place] etc) for 72 hours to reduce the risk of bleeding. You may take Tylenol (acetaminophen) for pain.  ? In addition to your home medications, take the following post-operative medications as prescribed by your physician.    ? Apply antibiotic ointment to all sutures three times a day, and into the operated eye(s) at night.   Once you run out, you can apply Vaseline or Aquaphor (over the counter) to the incisions. Don't put the Vaseline or Aquaphor into your eyes.   ? If you have ocular irritation, you can use over the counter artificial tears such as Refresh, Systane, or Blink. Do not use Visine, Clear Eyes, or any other drop that gets the red out.   ? In many cases, postoperative discomfort can be managed with Tylenol alone. If narcotic pain medication was prescribed, take pain pills at prescribed frequency as needed for pain.    WARNING:   ? Prescribed narcotic medications may make you drowsy. You must not drive a car, operate heavy machinery or drink alcohol while taking them.  ? Prescribed narcotic pain medications may cause constipation and nausea.

## 2024-05-13 NOTE — ANESTHESIA CARE TRANSFER NOTE
Patient: Mami Dumont    Procedure: Procedure(s):  Both upper eyelid blepharoplasty and internal browpexy       Diagnosis: Dermatochalasis of both upper eyelids [H02.831, H02.834]  Brow ptosis [H57.819]  Diagnosis Additional Information: No value filed.    Anesthesia Type:   MAC     Note:    Oropharynx: oropharynx clear of all foreign objects  Level of Consciousness: awake  Oxygen Supplementation: room air    Independent Airway: airway patency satisfactory and stable  Dentition: dentition unchanged  Vital Signs Stable: post-procedure vital signs reviewed and stable  Report to RN Given: handoff report given  Patient transferred to: Phase II  Comments: To Phase II. Report to RN.  VSS Resp status stable.  Handoff Report: Identifed the Patient, Identified the Reponsible Provider, Reviewed the pertinent medical history, Discussed the surgical course, Reviewed Intra-OP anesthesia mangement and issues during anesthesia, Set expectations for post-procedure period and Allowed opportunity for questions and acknowledgement of understanding      Vitals:  Vitals Value Taken Time   BP     Temp     Pulse     Resp     SpO2         Electronically Signed By: ARUNA Johnson CRNA  May 13, 2024  11:57 AM

## 2024-05-13 NOTE — OP NOTE
PREOPERATIVE DIAGNOSES:   1. Bilateral upper eyelid dermatochalasis.   2. Bilateral brow ptosis    POSTOPERATIVE DIAGNOSES:   1. Bilateral upper eyelid dermatochalasis.   2. Bilateral brow ptosis    PROCEDURE PERFORMED:   1. Bilateral upper eyelid blepharoplasty.   2. Bilateral brow ptosis repair.     SURGEON: Darrick Omer MD    ASSISTANT: Cem Humphries MD    ANESTHESIA: Monitored with local infiltration, 50/50 mixture of 2% lidocaine with epinephrine and 0.5% Marcaine.     COMPLICATIONS: None.     ESTIMATED BLOOD LOSS: Less than 5 mL.     HISTORY: Mami Dumont  presented with upper lid drooping interfering with superior visual field and activities of daily living. After the risks, benefits and alternatives to the proposed procedure were explained, informed consent was obtained.     DESCRIPTION OF PROCEDURE: Mami Dumont  was brought to the operating room and placed supine on the operating table. IV sedation was given. The upper lid crease and excess upper eyelid skin was marked on each upper eyelid, and the eyelids infiltrated with local anesthetic. The area was prepped and draped in the typical sterile fashion for oculoplastic surgery. Attention was directed to the right side. Skin was incised following marked lines. Skin and orbicularis muscle flap were excised with high temperature cautery. Orbital septum was opened horizontally. The nasal and central fat pads were debulked with the high temperature cautery. Again, hemostasis was obtained. Attention was directed to the brow. Dissection was then carried into the suborbicularis plane superiorly over the orbital rim. Blunt dissection was used to elevate the tail of the brow in pre periosteal plane.  A 4-0 PDS suture was passed through the brow at the marking made at the inferior brow hairs.  This suture was then passed through the frontal periosteum 1 cm above the orbital rim nicely securing the tail of the brow in an improved position.  It was then  passed through the orbicularis in line with the marking stitch. The suture was pulled through and tied in a permanent fashion. The  upper eyelid was closed with running 6-0 plain gut suture.      Attention was then directed to the left side where the same procedure was performed. Antibiotic ointment was applied. The patient tolerated the procedure well. Mami Dumont was taken to the recovery room in stable condition.    Darrick Omer MD

## 2024-05-29 ENCOUNTER — OFFICE VISIT (OUTPATIENT)
Dept: OPHTHALMOLOGY | Facility: CLINIC | Age: 79
End: 2024-05-29
Payer: COMMERCIAL

## 2024-05-29 DIAGNOSIS — H02.834 DERMATOCHALASIS OF BOTH UPPER EYELIDS: Primary | ICD-10-CM

## 2024-05-29 DIAGNOSIS — H02.831 DERMATOCHALASIS OF BOTH UPPER EYELIDS: Primary | ICD-10-CM

## 2024-05-29 DIAGNOSIS — H57.819 BROW PTOSIS: ICD-10-CM

## 2024-05-29 PROCEDURE — 99024 POSTOP FOLLOW-UP VISIT: CPT | Performed by: OPHTHALMOLOGY

## 2024-05-29 ASSESSMENT — TONOMETRY
OD_IOP_MMHG: 14
OS_IOP_MMHG: 11
IOP_METHOD: ICARE

## 2024-05-29 ASSESSMENT — VISUAL ACUITY
OD_SC: 20/20
METHOD: SNELLEN - LINEAR
OS_SC: 20/25

## 2024-05-29 NOTE — NURSING NOTE
Chief Complaints and History of Present Illnesses   Patient presents with    Post Op (Ophthalmology) Both Eyes       Chief Complaint(s) and History of Present Illness(es)       Post Op (Ophthalmology) Both Eyes              Laterality: both eyes              Comments    S/p bilateral upper eyelid blepharoplasty, bilateral brow ptosis repair 5/13/24. Patient feels things would have been healed more by now. Doesn't think things look lifted too much. Right side still has a large area/bump that appears swollen. Done with erythromycin ointment.                    Lolis Quiles, COT

## 2024-05-29 NOTE — PROGRESS NOTES
"Chief Complaint(s) and History of Present Illness(es)     Post Op (Ophthalmology) Both Eyes            Laterality: both eyes          Comments    S/p bilateral upper eyelid blepharoplasty, bilateral brow ptosis repair   5/13/24. Patient feels things would have been healed more by now. Doesn't   think things look lifted too much. Right side still has a large area/bump   that appears swollen. Done with erythromycin ointment.           Doing well. Happy with outcome.    Patient Instructions   - Apply warm compresses for 1 minute two to three times a day until your bruising has resolved. You can continue this for one more month.   - Apply Aquaphor or Vaseline to the incision at bedtime until it is smooth.    - If you have symptoms of eye irritation, it is good to use over the counter artificial tears. Good brands include Refresh, Blink, and Systane. Do NOT get drops that are for \"red eyes.\"   - It is normal for the incision to appear raised, red, itch and have small lumps. You can gently massage any small bumps along the incision line. These can take up to six months to resolve.    Follow-up       Attending Physician Attestation: Complete documentation of historical and exam elements from today's encounter can be found in the full encounter summary report (not reduplicated in this progress note). I personally obtained the chief complaint(s) and history of present illness. I confirmed and edited as necessary the review of systems, past medical/surgical history, family history, social history, and examination findings as documented by others; and I examined the patient myself. I personally reviewed the relevant tests, images, and reports as documented above. I formulated and edited as necessary the assessment and plan and discussed the findings and management plan with the patient and family. I personally reviewed the ophthalmic test(s) associated with this encounter, agree with the interpretation(s) as documented by the " resident/fellow, and have edited the corresponding report(s) as necessary. Darrick Omer MD

## 2024-07-11 ENCOUNTER — OFFICE VISIT (OUTPATIENT)
Dept: DERMATOLOGY | Facility: CLINIC | Age: 79
End: 2024-07-11
Payer: COMMERCIAL

## 2024-07-11 DIAGNOSIS — Z85.828 HISTORY OF BASAL CELL CARCINOMA (BCC): ICD-10-CM

## 2024-07-11 DIAGNOSIS — L57.0 ACTINIC KERATOSIS: ICD-10-CM

## 2024-07-11 DIAGNOSIS — L82.1 SEBORRHEIC KERATOSIS: ICD-10-CM

## 2024-07-11 DIAGNOSIS — L81.4 LENTIGO: ICD-10-CM

## 2024-07-11 DIAGNOSIS — D18.01 ANGIOMA OF SKIN: ICD-10-CM

## 2024-07-11 DIAGNOSIS — D22.9 NEVUS: Primary | ICD-10-CM

## 2024-07-11 PROCEDURE — 99213 OFFICE O/P EST LOW 20 MIN: CPT | Performed by: PHYSICIAN ASSISTANT

## 2024-07-11 ASSESSMENT — PAIN SCALES - GENERAL: PAINLEVEL: NO PAIN (0)

## 2024-07-11 NOTE — LETTER
7/11/2024      Mami Dumont  [address]  [place] [address]      Dear Colleague,    Thank you for referring your patient, Mami Dumont, to the [facility]. Please see a copy of my visit note below.    HPI:   Chief complaints: Mami Dumont is a pleasant 79 year old female who presents for Full skin cancer screening to rule out skin cancer   Last Skin Exam: n/a      1st Baseline: yes  Personal HX of Skin Cancer: yes BCC on the left upper forehead 9/2023   Personal HX of Malignant Melanoma: no   Family HX of Skin Cancer / Malignant Melanoma: no  Personal HX of Atypical Moles:   no  Risk factors: history of sun exposure and burns; history of bcc  New / Changing lesions:yes scaly spot on the left cheek  Social History:   On review of systems, there are no further skin complaints, patient is feeling otherwise well.   ROS of the following were done and are negative: Constitutional, Eyes, Ears, Nose,   Mouth, Throat, Cardiovascular, Respiratory, GI, Genitourinary, Musculoskeletal,   Psychiatric, Endocrine, Allergic/Immunologic.    PHYSICAL EXAM:   There were no vitals taken for this visit.  Skin exam performed as follows: Type 2 skin. Mood appropriate  Alert and Oriented X 3. Well developed, well nourished in no distress.  General appearance: Normal  Head including face: Normal  Eyes: conjunctiva and lids: Normal  Mouth: Lips, teeth, gums: Normal  Neck: Normal  Chest-breast/axillae: Normal  Back: Normal  Extremities: digits/nails (clubbing): Normal  Eccrine and Apocrine glands: Normal  Right upper extremity: Normal  Left upper extremity: Normal  Right lower extremity: Normal  Left lower extremity: Normal  Skin: Scalp and body hair: See below    Pt deferred exam of breasts, groin, buttocks: No    Other physical findings:  1. Multiple pigmented macules on extremities and trunk  2. Multiple pigmented macules on face, trunk and extremities  3. Multiple vascular papules on  trunk, arms and legs  4. Multiple scattered keratotic plaques  5. Pink gritty papule on the left medial cheek x 1       Except as noted above, no other signs of skin cancer or melanoma.     ASSESSMENT/PLAN:   Benign Full skin cancer screening today. . Patient with history of none  Advised on monthly self exams and 1 year  Patient Education: Appropriate brochures given.    Multiple benign appearing melanocytic nevi on arms, legs and trunk. Discussed ABCDEs of melanoma and sunscreen.   Multiple lentigos on arms, legs and trunk. Advised benign, no treatment needed.  Multiple scattered angiomas. Advised benign, no treatment needed.   Seborrheic keratosis on arms, legs and trunk. Advised benign, no treatment needed.  Actinic keratosis on the left medial cheek - she will return within the next few weeks for treatment as she has an event this weekend.   Facial rhytids - continue tretinoin daily  Discussed Botox for vertical lip lines and she will schedule for this.           Follow-up: yearly FSE/PRN sooner    1.) Patient was asked about new and changing moles. YES  2.) Patient received a complete physical skin examination: YES  3.) Patient was counseled to perform a monthly self skin examination: YES  Scribed By: Pamela Michelle, MS, PAGALO      Again, thank you for allowing me to participate in the care of your patient.        Sincerely,        Pamela Michelle PA-C

## 2024-07-11 NOTE — PROGRESS NOTES
HPI:   Chief complaints: Mami Dumont is a pleasant 79 year old female who presents for Full skin cancer screening to rule out skin cancer   Last Skin Exam: n/a      1st Baseline: yes  Personal HX of Skin Cancer: yes BCC on the left upper forehead 9/2023   Personal HX of Malignant Melanoma: no   Family HX of Skin Cancer / Malignant Melanoma: no  Personal HX of Atypical Moles:   no  Risk factors: history of sun exposure and burns; history of bcc  New / Changing lesions:yes scaly spot on the left cheek  Social History:   On review of systems, there are no further skin complaints, patient is feeling otherwise well.   ROS of the following were done and are negative: Constitutional, Eyes, Ears, Nose,   Mouth, Throat, Cardiovascular, Respiratory, GI, Genitourinary, Musculoskeletal,   Psychiatric, Endocrine, Allergic/Immunologic.    PHYSICAL EXAM:   There were no vitals taken for this visit.  Skin exam performed as follows: Type 2 skin. Mood appropriate  Alert and Oriented X 3. Well developed, well nourished in no distress.  General appearance: Normal  Head including face: Normal  Eyes: conjunctiva and lids: Normal  Mouth: Lips, teeth, gums: Normal  Neck: Normal  Chest-breast/axillae: Normal  Back: Normal  Extremities: digits/nails (clubbing): Normal  Eccrine and Apocrine glands: Normal  Right upper extremity: Normal  Left upper extremity: Normal  Right lower extremity: Normal  Left lower extremity: Normal  Skin: Scalp and body hair: See below    Pt deferred exam of breasts, groin, buttocks: No    Other physical findings:  1. Multiple pigmented macules on extremities and trunk  2. Multiple pigmented macules on face, trunk and extremities  3. Multiple vascular papules on trunk, arms and legs  4. Multiple scattered keratotic plaques  5. Pink gritty papule on the left medial cheek x 1       Except as noted above, no other signs of skin cancer or melanoma.     ASSESSMENT/PLAN:   Benign Full skin cancer screening today. .  Patient with history of none  Advised on monthly self exams and 1 year  Patient Education: Appropriate brochures given.    Multiple benign appearing melanocytic nevi on arms, legs and trunk. Discussed ABCDEs of melanoma and sunscreen.   Multiple lentigos on arms, legs and trunk. Advised benign, no treatment needed.  Multiple scattered angiomas. Advised benign, no treatment needed.   Seborrheic keratosis on arms, legs and trunk. Advised benign, no treatment needed.  Actinic keratosis on the left medial cheek - she will return within the next few weeks for treatment as she has an event this weekend.   Facial rhytids - continue tretinoin daily  Discussed Botox for vertical lip lines and she will schedule for this.           Follow-up: yearly FSE/PRN sooner    1.) Patient was asked about new and changing moles. YES  2.) Patient received a complete physical skin examination: YES  3.) Patient was counseled to perform a monthly self skin examination: YES  Scribed By: Pamela Michelle, MS, PA-C

## 2024-07-15 ENCOUNTER — OFFICE VISIT (OUTPATIENT)
Dept: DERMATOLOGY | Facility: CLINIC | Age: 79
End: 2024-07-15
Payer: COMMERCIAL

## 2024-07-15 DIAGNOSIS — L57.0 ACTINIC KERATOSIS: Primary | ICD-10-CM

## 2024-07-15 PROCEDURE — 17000 DESTRUCT PREMALG LESION: CPT | Performed by: PHYSICIAN ASSISTANT

## 2024-07-15 NOTE — LETTER
7/15/2024      Mami uDmont  [address]  [place] [facility] [address]      Dear Colleague,    Thank you for referring your patient, Mami Dumont, to the [facility]. Please see a copy of my visit note below.    HPI:   Chief complaints: Mami Dumont is a pleasant 79 year old female who presents for treatment of a previously identified actinic keratosis on the left medial cheek.       PHYSICAL EXAM:    There were no vitals taken for this visit.  Skin exam performed as follows: Type 2 skin. Mood appropriate  Alert and Oriented X 3. Well developed, well nourished in no distress.  General appearance: Normal  Head including face: Normal  Eyes: conjunctiva and lids: Normal  Mouth: Lips, teeth, gums: Normal  Neck: Normal  Skin: Scalp and body hair: See below    Pink gritty papule on the left medial cheek x 1    ASSESSMENT/PLAN:     Actinic keratosis on the left medial cheek x 1. As precancerous, cryosurgery performed. Advised on blistering and post-op care. Advised if not resolved in 1-2 months to return for evaluation            Follow-up: yearly  CC:   Scribed By: Pamela Michelle, MS, PAGALO      Again, thank you for allowing me to participate in the care of your patient.        Sincerely,        Pamela Michelle PA-C

## 2024-07-15 NOTE — PROGRESS NOTES
HPI:   Chief complaints: Mami Dumont is a pleasant 79 year old female who presents for treatment of a previously identified actinic keratosis on the left medial cheek.       PHYSICAL EXAM:    There were no vitals taken for this visit.  Skin exam performed as follows: Type 2 skin. Mood appropriate  Alert and Oriented X 3. Well developed, well nourished in no distress.  General appearance: Normal  Head including face: Normal  Eyes: conjunctiva and lids: Normal  Mouth: Lips, teeth, gums: Normal  Neck: Normal  Skin: Scalp and body hair: See below    Pink gritty papule on the left medial cheek x 1    ASSESSMENT/PLAN:     Actinic keratosis on the left medial cheek x 1. As precancerous, cryosurgery performed. Advised on blistering and post-op care. Advised if not resolved in 1-2 months to return for evaluation            Follow-up: yearly  CC:   Scribed By: Pamela Michelle, MS, PAGALO

## 2024-07-25 DIAGNOSIS — I10 BENIGN ESSENTIAL HYPERTENSION: ICD-10-CM

## 2024-07-25 RX ORDER — AMLODIPINE BESYLATE 5 MG/1
5 TABLET ORAL DAILY
Qty: 90 TABLET | Refills: 0 | Status: SHIPPED | OUTPATIENT
Start: 2024-07-25

## 2024-07-25 NOTE — TELEPHONE ENCOUNTER
Patient calling about this refill.   She has been waiting 5 days for this to be done and has been out the last couple days.   Needs refill today.     Routing high priority again to PCP.    Gini Del Rio BSN, RN

## 2024-07-29 DIAGNOSIS — L98.8 FACIAL RHYTIDS: ICD-10-CM

## 2024-07-29 DIAGNOSIS — L81.4: ICD-10-CM

## 2024-07-29 RX ORDER — TRETINOIN 0.25 MG/G
CREAM TOPICAL
Qty: 45 G | Refills: 11 | Status: SHIPPED | OUTPATIENT
Start: 2024-07-29

## 2024-07-30 ENCOUNTER — TELEPHONE (OUTPATIENT)
Dept: DERMATOLOGY | Facility: CLINIC | Age: 79
End: 2024-07-30
Payer: COMMERCIAL

## 2024-07-30 NOTE — TELEPHONE ENCOUNTER
"Retail Pharmacy Prior Authorization Team   Phone: [phone]        PRIOR AUTHORIZATION DENIED    Medication: TRETINOIN 0.025 % EX CREA  Insurance Company: [employer] [place] - Phone [phone] Fax [phone]  Denial Date: 7/30/2024  Denial Reason(s):             Appeal Information: To send an appeal to the patient's insurance, please provide a letter of medical necessity for the requested medication that was denied.  Please use the denial rationale from the patient's insurance to write the letter.  Once it is completed, please have it available under the \"letters tab\" in the patient's chart and route directly back to me: RENATA FRAZIER and I can send the appeal to the patient's insurance.   Patient Notified: No. The [employer] PA Team does not notify of the denial outcomes as the patient often will ask what their provider will prescribe in place of the denied medication, or additional information in regards to other therapies they can take in place of the denied medication.  This is not something we can advise on in our department.    "

## 2024-07-31 NOTE — TELEPHONE ENCOUNTER
Spoke to patient and advised of denial of rx. Patient verbalized understanding. Mary Pimentel RN

## 2024-07-31 NOTE — TELEPHONE ENCOUNTER
Detailed My chart message sent to patient.     If not read, will have to call patient.     Mary Pimentel RN

## 2024-08-10 ENCOUNTER — HEALTH MAINTENANCE LETTER (OUTPATIENT)
Age: 79
End: 2024-08-10

## 2024-11-04 DIAGNOSIS — I10 BENIGN ESSENTIAL HYPERTENSION: ICD-10-CM

## 2024-11-06 RX ORDER — AMLODIPINE BESYLATE 5 MG/1
5 TABLET ORAL DAILY
Qty: 90 TABLET | Refills: 0 | Status: SHIPPED | OUTPATIENT
Start: 2024-11-06

## 2025-01-06 ENCOUNTER — OFFICE VISIT (OUTPATIENT)
Dept: DERMATOLOGY | Facility: CLINIC | Age: 80
End: 2025-01-06
Payer: COMMERCIAL

## 2025-01-06 DIAGNOSIS — Z41.1 ELECTIVE PROCEDURE FOR UNACCEPTABLE COSMETIC APPEARANCE: Primary | ICD-10-CM

## 2025-01-06 PROCEDURE — 96999 UNLISTED SPEC DERM SVC/PX: CPT | Performed by: PHYSICIAN ASSISTANT

## 2025-01-06 NOTE — PATIENT INSTRUCTIONS
I like products form [employer] - you can get these at [employer] or [employer]     Try the Glycolic acid toner and the peptide blend     PM  Wash  Apply the glycolic acid toner  Tretinoin cream  Peptide blend over  Moisturizer

## 2025-01-06 NOTE — LETTER
1/6/2025      Mami Dumont  [address]  [place] [address]      Dear Colleague,    Thank you for referring your patient, Mami Dumont, to the [facility]. Please see a copy of my visit note below.    HPI:   Chief complaints: Mami Dumont is a pleasant 79 year old female who presents for cosmetic botox. She is most bothered by her lips and glabellar wrinkles but would like to address her vertical lip lines today. She liked the results of her last treatment in 2021      PHYSICAL EXAM:    There were no vitals taken for this visit.  Skin exam performed as follows: Type 2 skin. Mood appropriate  Alert and Oriented X 3. Well developed, well nourished in no distress.  General appearance: Normal  Head including face: Normal  Eyes: conjunctiva and lids: Normal  Mouth: Lips, teeth, gums: Normal  Neck: Normal  Cardiovascular: Exam of peripheral vascular system by observation for swelling, varicosities, edema: Normal  Right upper extremity: Normal  Left upper extremity: Normal  Right lower extremity: Normal  Left lower extremity: Normal  Skin: Scalp and body hair: See below    Age appropriate facial rhytids    ASSESSMENT/PLAN:     BOTOX:  PGACAC discussed.  Risks including but not limited to injection site reaction, bruising, asymmetry, no resolution of rhytides, brow ptosis, dry mouth, tiredness, and headache.  Also label warnings are difficulty with swallowing, speaking, breathing, muscle weakness, loss of bladder control, and double vision/blurred vision.     Botox-A in concentration of 2.5u/0.1cc was injected IM to upper lip and lowe lip.    --Total injected was 8 units to the upper lip and 6 units for a total of 14 units.          Total cost: $196      Follow-up: PRN  CC:   Scribed By: Pamela Michelle, MS, PA-C          Clinic Administered Medication Documentation        Patient was given Botox. Prior to medication administration, verified patient's identity using  patient s name and date of birth. Please see MAR and medication order for additional information. Patient instructed to remain in clinic for 15 minutes and report any adverse reaction to staff immediately.    Vial/Syringe: Single dose vial. Was entire vial of medication used? No, The remainder 36 Units of 50 Units was discarded as unavoidable waste.        Again, thank you for allowing me to participate in the care of your patient.        Sincerely,        Pamela Hu PA-C    Electronically signed

## 2025-01-06 NOTE — PROGRESS NOTES
HPI:   Chief complaints: Mami Dumont is a pleasant 79 year old female who presents for cosmetic botox. She is most bothered by her lips and glabellar wrinkles but would like to address her vertical lip lines today. She liked the results of her last treatment in 2021      PHYSICAL EXAM:    There were no vitals taken for this visit.  Skin exam performed as follows: Type 2 skin. Mood appropriate  Alert and Oriented X 3. Well developed, well nourished in no distress.  General appearance: Normal  Head including face: Normal  Eyes: conjunctiva and lids: Normal  Mouth: Lips, teeth, gums: Normal  Neck: Normal  Cardiovascular: Exam of peripheral vascular system by observation for swelling, varicosities, edema: Normal  Right upper extremity: Normal  Left upper extremity: Normal  Right lower extremity: Normal  Left lower extremity: Normal  Skin: Scalp and body hair: See below    Age appropriate facial rhytids    ASSESSMENT/PLAN:     BOTOX:  PGACAC discussed.  Risks including but not limited to injection site reaction, bruising, asymmetry, no resolution of rhytides, brow ptosis, dry mouth, tiredness, and headache.  Also label warnings are difficulty with swallowing, speaking, breathing, muscle weakness, loss of bladder control, and double vision/blurred vision.     Botox-A in concentration of 2.5u/0.1cc was injected IM to upper lip and lowe lip.    --Total injected was 8 units to the upper lip and 6 units for a total of 14 units.          Total cost: $196      Follow-up: PRN  CC:   Scribed By: Pamela Michelle, MS, PA-C

## 2025-01-06 NOTE — PROGRESS NOTES
Clinic Administered Medication Documentation        Patient was given Botox. Prior to medication administration, verified patient's identity using patient s name and date of birth. Please see MAR and medication order for additional information. Patient instructed to remain in clinic for 15 minutes and report any adverse reaction to staff immediately.    Vial/Syringe: Single dose vial. Was entire vial of medication used? No, The remainder 36 Units of 50 Units was discarded as unavoidable waste.

## 2025-02-07 DIAGNOSIS — I10 BENIGN ESSENTIAL HYPERTENSION: ICD-10-CM

## 2025-02-10 RX ORDER — AMLODIPINE BESYLATE 5 MG/1
5 TABLET ORAL DAILY
Qty: 90 TABLET | Refills: 0 | Status: SHIPPED | OUTPATIENT
Start: 2025-02-10

## 2025-02-12 DIAGNOSIS — L98.8 FACIAL RHYTIDS: ICD-10-CM

## 2025-02-12 DIAGNOSIS — L81.4: ICD-10-CM

## 2025-02-12 RX ORDER — TRETINOIN 0.25 MG/G
CREAM TOPICAL
Qty: 45 G | Refills: 11 | Status: SHIPPED | OUTPATIENT
Start: 2025-02-12

## 2025-02-12 NOTE — TELEPHONE ENCOUNTER
Requested Prescriptions   Pending Prescriptions Disp Refills    tretinoin (RETIN-A) 0.025 % external cream 45 g 11     Sig: Apply a pea size to entire face QD       There is no refill protocol information for this order          Last office visit: 1/6/2025 ; last virtual visit: Visit date not found with prescribing provider:  Pamela Hu    Future Office Visit:        Sharlene Hwang   [facility] [place]   [facility]  [phone]

## 2025-02-13 ENCOUNTER — TELEPHONE (OUTPATIENT)
Dept: DERMATOLOGY | Facility: CLINIC | Age: 80
End: 2025-02-13

## 2025-02-13 NOTE — TELEPHONE ENCOUNTER
Retail Pharmacy Prior Authorization Team   Phone: [phone]    PRIOR AUTHORIZATION DENIED    Medication: TRETINOIN 0.025 % EX CREA  Insurance Company: [employer] [place] - Phone [phone] Fax [phone]  Denial Date: 2/13/2025  Denial Reason(s): DRUGS USED FOR COSMETIC PURPOSES ARE EXCLUDED FROM COVERAGE  Appeal Information: N/A  Patient Notified: NO

## 2025-04-01 ENCOUNTER — PATIENT OUTREACH (OUTPATIENT)
Dept: FAMILY MEDICINE | Facility: CLINIC | Age: 80
End: 2025-04-01
Payer: COMMERCIAL

## 2025-04-01 NOTE — TELEPHONE ENCOUNTER
Patient Quality Outreach    Patient is due for the following:   Physical Annual Wellness Visit    Action(s) Taken:   Schedule a Annual Wellness Visit    Type of outreach:    Sent [employer] message.    Questions for provider review:    None         Donna Caldwell [facility]  Chart routed to None.

## 2025-04-08 DIAGNOSIS — E78.5 DYSLIPIDEMIA: ICD-10-CM

## 2025-04-08 RX ORDER — ROSUVASTATIN CALCIUM 5 MG/1
5 TABLET, COATED ORAL DAILY
Qty: 90 TABLET | Refills: 0 | Status: SHIPPED | OUTPATIENT
Start: 2025-04-08

## 2025-04-08 NOTE — TELEPHONE ENCOUNTER
Clinic RN: Please investigate patient's chart or contact patient if the information cannot be found because patient should have run out of this medication on 10/30/24. Confirm patient is taking this medication as prescribed. Document findings and route refill encounter to provider for approval or denial.    Teresa Le RN on 4/8/2025 at 10:40 AM

## 2025-04-08 NOTE — TELEPHONE ENCOUNTER
Left message on answering machine for patient to call back to [phone].  Huma Meza BSN, RN

## 2025-04-08 NOTE — TELEPHONE ENCOUNTER
Patient returning call. Patient states that she takes prescription rosuvastatin daily, has not missed any doses. Patient requesting prescription be sent as soon as possible, she is leaving town on Friday. Routing to provider high priority. Please review and advise. Angel Goldstein RN, BSN, PHN

## 2025-05-12 DIAGNOSIS — I10 BENIGN ESSENTIAL HYPERTENSION: ICD-10-CM

## 2025-05-13 DIAGNOSIS — I10 BENIGN ESSENTIAL HYPERTENSION: ICD-10-CM

## 2025-05-13 RX ORDER — AMLODIPINE BESYLATE 5 MG/1
5 TABLET ORAL DAILY
Qty: 90 TABLET | Refills: 0 | Status: SHIPPED | OUTPATIENT
Start: 2025-05-13 | End: 2025-05-15

## 2025-05-13 RX ORDER — AMLODIPINE BESYLATE 5 MG/1
5 TABLET ORAL DAILY
Qty: 90 TABLET | Refills: 0 | OUTPATIENT
Start: 2025-05-13

## 2025-05-14 DIAGNOSIS — I10 BENIGN ESSENTIAL HYPERTENSION: ICD-10-CM

## 2025-05-15 ENCOUNTER — TELEPHONE (OUTPATIENT)
Dept: FAMILY MEDICINE | Facility: CLINIC | Age: 80
End: 2025-05-15
Payer: COMMERCIAL

## 2025-05-15 DIAGNOSIS — I10 BENIGN ESSENTIAL HYPERTENSION: ICD-10-CM

## 2025-05-15 RX ORDER — AMLODIPINE BESYLATE 5 MG/1
5 TABLET ORAL DAILY
Qty: 90 TABLET | Refills: 0 | Status: SHIPPED | OUTPATIENT
Start: 2025-05-15

## 2025-05-15 RX ORDER — AMLODIPINE BESYLATE 5 MG/1
5 TABLET ORAL DAILY
Qty: 90 TABLET | Refills: 0 | OUTPATIENT
Start: 2025-05-15

## 2025-05-15 NOTE — TELEPHONE ENCOUNTER
Not sure why medication was discontinued and then reordered by PCP.  This RN sent new order to pharmacy since they didn't receive the providers refill order.    Gini Del Rio BSN, RN     DISPLAY PLAN FREE TEXT

## 2025-05-15 NOTE — TELEPHONE ENCOUNTER
Medication Question or Refill        What medication are you calling about (include dose and sig)?: amLODIPine (NORVASC) 5 MG tablet     Preferred Pharmacy:    [id number] - [place], [place] - [address]  [address]  [place] [address]  Phone: [phone] Fax: [phone]        Controlled Substance Agreement on file:   CSA -- Patient Level:    CSA: None found at the patient level.       Who prescribed the medication?: Dr Atkinson    Do you need a refill? Yes, patient states that [facility] told her that the amlodipine RX was cancelled. Can this be resent please. Looks like it was sent in on 5/13/25.    When did you use the medication last?     Patient offered an appointment? No    Do you have any questions or concerns?  Yes: [facility]      Could we send this information to you in [employer] or would you prefer to receive a phone call?:   Patient would prefer a phone call   Okay to leave a detailed message?: Yes at Cell number on file:    Telephone Information:   Mobile [phone]     Jada EVANS [facility]

## 2025-08-16 ENCOUNTER — HEALTH MAINTENANCE LETTER (OUTPATIENT)
Age: 80
End: 2025-08-16

## (undated) DEVICE — ESU EYE HIGH TEMP 65410-183

## (undated) DEVICE — SU PLAIN 6-0 G-1DA 18" 770G

## (undated) DEVICE — PACK MINOR EYE

## (undated) DEVICE — ESU ELEC NDL 1" COATED/INSULATED E1465

## (undated) DEVICE — SU PROLENE 4-0 RB-1DA 36" 8557H

## (undated) DEVICE — LINEN TOWEL PACK X5 5464

## (undated) DEVICE — NDL 30GA 0.5" 305106

## (undated) DEVICE — PACK OCULOPLATIC SEN15OCFSD

## (undated) DEVICE — EYE PACK CUSTOM CATARACT AS12127-01

## (undated) DEVICE — GLOVE PROTEXIS W/NEU-THERA 7.5  2D73TE75

## (undated) DEVICE — GLOVE BIOGEL PI MICRO SZ 7.5 48575

## (undated) DEVICE — SYR 03ML LL W/O NDL

## (undated) DEVICE — SU PDS II 4-0 P-3 18" Z494G

## (undated) DEVICE — SOL WATER IRRIG 1000ML BOTTLE 07139-09

## (undated) RX ORDER — ONDANSETRON 2 MG/ML
INJECTION INTRAMUSCULAR; INTRAVENOUS
Status: DISPENSED
Start: 2017-05-19

## (undated) RX ORDER — FENTANYL CITRATE 50 UG/ML
INJECTION, SOLUTION INTRAMUSCULAR; INTRAVENOUS
Status: DISPENSED
Start: 2024-05-13

## (undated) RX ORDER — KETOROLAC TROMETHAMINE 30 MG/ML
INJECTION, SOLUTION INTRAMUSCULAR; INTRAVENOUS
Status: DISPENSED
Start: 2017-05-19

## (undated) RX ORDER — FENTANYL CITRATE 50 UG/ML
INJECTION, SOLUTION INTRAMUSCULAR; INTRAVENOUS
Status: DISPENSED
Start: 2023-08-14

## (undated) RX ORDER — ACETAMINOPHEN 325 MG/1
TABLET ORAL
Status: DISPENSED
Start: 2024-05-13

## (undated) RX ORDER — ACETAMINOPHEN 325 MG/1
TABLET ORAL
Status: DISPENSED
Start: 2023-08-14

## (undated) RX ORDER — FENTANYL CITRATE 50 UG/ML
INJECTION, SOLUTION INTRAMUSCULAR; INTRAVENOUS
Status: DISPENSED
Start: 2017-05-19